# Patient Record
Sex: FEMALE | Race: BLACK OR AFRICAN AMERICAN | NOT HISPANIC OR LATINO | Employment: OTHER | URBAN - METROPOLITAN AREA
[De-identification: names, ages, dates, MRNs, and addresses within clinical notes are randomized per-mention and may not be internally consistent; named-entity substitution may affect disease eponyms.]

---

## 2020-02-12 ENCOUNTER — HOSPITAL ENCOUNTER (INPATIENT)
Facility: HOSPITAL | Age: 70
LOS: 12 days | Discharge: NURSING, REHAB OR LONG-TERM CARE | DRG: 616 | End: 2020-02-24
Attending: EMERGENCY MEDICINE | Admitting: HOSPITALIST
Payer: MEDICARE

## 2020-02-12 DIAGNOSIS — M25.572 CHRONIC PAIN OF LEFT ANKLE: ICD-10-CM

## 2020-02-12 DIAGNOSIS — E11.9 TYPE 2 DIABETES MELLITUS WITHOUT COMPLICATION, WITHOUT LONG-TERM CURRENT USE OF INSULIN: ICD-10-CM

## 2020-02-12 DIAGNOSIS — B99.9 INFECTION: ICD-10-CM

## 2020-02-12 DIAGNOSIS — I73.9 PAD (PERIPHERAL ARTERY DISEASE): ICD-10-CM

## 2020-02-12 DIAGNOSIS — L08.9 WOUND INFECTION: ICD-10-CM

## 2020-02-12 DIAGNOSIS — L97.509 ULCER OF FOOT, UNSPECIFIED LATERALITY, UNSPECIFIED ULCER STAGE: Primary | ICD-10-CM

## 2020-02-12 DIAGNOSIS — M79.89 LEFT ARM SWELLING: ICD-10-CM

## 2020-02-12 DIAGNOSIS — L03.90 CELLULITIS, UNSPECIFIED CELLULITIS SITE: ICD-10-CM

## 2020-02-12 DIAGNOSIS — M86.9 OSTEOMYELITIS OF LEFT FOOT, UNSPECIFIED TYPE: ICD-10-CM

## 2020-02-12 DIAGNOSIS — T14.8XXA WOUND INFECTION: ICD-10-CM

## 2020-02-12 DIAGNOSIS — M17.12 PRIMARY OSTEOARTHRITIS OF LEFT KNEE: ICD-10-CM

## 2020-02-12 DIAGNOSIS — M86.172 ACUTE OSTEOMYELITIS OF TOE OF LEFT FOOT: ICD-10-CM

## 2020-02-12 DIAGNOSIS — I10 ESSENTIAL HYPERTENSION: ICD-10-CM

## 2020-02-12 DIAGNOSIS — M86.072 ACUTE HEMATOGENOUS OSTEOMYELITIS OF LEFT FOOT: ICD-10-CM

## 2020-02-12 DIAGNOSIS — I80.8 SUPERFICIAL THROMBOPHLEBITIS OF LEFT UPPER EXTREMITY: ICD-10-CM

## 2020-02-12 DIAGNOSIS — L08.9 DIABETIC FOOT INFECTION: ICD-10-CM

## 2020-02-12 DIAGNOSIS — E11.628 DIABETIC FOOT INFECTION: ICD-10-CM

## 2020-02-12 DIAGNOSIS — G89.29 CHRONIC PAIN OF LEFT ANKLE: ICD-10-CM

## 2020-02-12 DIAGNOSIS — I73.9 PVD (PERIPHERAL VASCULAR DISEASE): ICD-10-CM

## 2020-02-12 LAB
ALBUMIN SERPL BCP-MCNC: 3.4 G/DL (ref 3.5–5.2)
ALP SERPL-CCNC: 92 U/L (ref 55–135)
ALT SERPL W/O P-5'-P-CCNC: 13 U/L (ref 10–44)
ANION GAP SERPL CALC-SCNC: 9 MMOL/L (ref 8–16)
AST SERPL-CCNC: 18 U/L (ref 10–40)
BACTERIA #/AREA URNS AUTO: ABNORMAL /HPF
BASOPHILS # BLD AUTO: 0.04 K/UL (ref 0–0.2)
BASOPHILS NFR BLD: 0.5 % (ref 0–1.9)
BILIRUB SERPL-MCNC: 0.2 MG/DL (ref 0.1–1)
BILIRUB UR QL STRIP: NEGATIVE
BUN SERPL-MCNC: 14 MG/DL (ref 8–23)
CALCIUM SERPL-MCNC: 10.1 MG/DL (ref 8.7–10.5)
CHLORIDE SERPL-SCNC: 101 MMOL/L (ref 95–110)
CLARITY UR REFRACT.AUTO: ABNORMAL
CO2 SERPL-SCNC: 28 MMOL/L (ref 23–29)
COLOR UR AUTO: YELLOW
CREAT SERPL-MCNC: 1 MG/DL (ref 0.5–1.4)
CRP SERPL-MCNC: 7.1 MG/L (ref 0–8.2)
DIFFERENTIAL METHOD: ABNORMAL
EOSINOPHIL # BLD AUTO: 0.3 K/UL (ref 0–0.5)
EOSINOPHIL NFR BLD: 3.6 % (ref 0–8)
ERYTHROCYTE [DISTWIDTH] IN BLOOD BY AUTOMATED COUNT: 13.9 % (ref 11.5–14.5)
ERYTHROCYTE [SEDIMENTATION RATE] IN BLOOD BY WESTERGREN METHOD: 88 MM/HR (ref 0–36)
EST. GFR  (AFRICAN AMERICAN): >60 ML/MIN/1.73 M^2
EST. GFR  (NON AFRICAN AMERICAN): 57.2 ML/MIN/1.73 M^2
ESTIMATED AVG GLUCOSE: 171 MG/DL (ref 68–131)
GLUCOSE SERPL-MCNC: 175 MG/DL (ref 70–110)
GLUCOSE UR QL STRIP: NEGATIVE
HBA1C MFR BLD HPLC: 7.6 % (ref 4–5.6)
HCT VFR BLD AUTO: 36.3 % (ref 37–48.5)
HGB BLD-MCNC: 11.3 G/DL (ref 12–16)
HGB UR QL STRIP: NEGATIVE
IMM GRANULOCYTES # BLD AUTO: 0.02 K/UL (ref 0–0.04)
IMM GRANULOCYTES NFR BLD AUTO: 0.3 % (ref 0–0.5)
KETONES UR QL STRIP: NEGATIVE
LACTATE SERPL-SCNC: 1.2 MMOL/L (ref 0.5–2.2)
LEUKOCYTE ESTERASE UR QL STRIP: ABNORMAL
LYMPHOCYTES # BLD AUTO: 3.2 K/UL (ref 1–4.8)
LYMPHOCYTES NFR BLD: 40.8 % (ref 18–48)
MAGNESIUM SERPL-MCNC: 1.9 MG/DL (ref 1.6–2.6)
MCH RBC QN AUTO: 29.7 PG (ref 27–31)
MCHC RBC AUTO-ENTMCNC: 31.1 G/DL (ref 32–36)
MCV RBC AUTO: 95 FL (ref 82–98)
MICROSCOPIC COMMENT: ABNORMAL
MONOCYTES # BLD AUTO: 0.6 K/UL (ref 0.3–1)
MONOCYTES NFR BLD: 7.4 % (ref 4–15)
NEUTROPHILS # BLD AUTO: 3.7 K/UL (ref 1.8–7.7)
NEUTROPHILS NFR BLD: 47.4 % (ref 38–73)
NITRITE UR QL STRIP: NEGATIVE
NRBC BLD-RTO: 0 /100 WBC
PH UR STRIP: 5 [PH] (ref 5–8)
PHOSPHATE SERPL-MCNC: 2.9 MG/DL (ref 2.7–4.5)
PLATELET # BLD AUTO: 228 K/UL (ref 150–350)
PMV BLD AUTO: 10.8 FL (ref 9.2–12.9)
POTASSIUM SERPL-SCNC: 4.1 MMOL/L (ref 3.5–5.1)
PROT SERPL-MCNC: 8 G/DL (ref 6–8.4)
PROT UR QL STRIP: NEGATIVE
RBC # BLD AUTO: 3.81 M/UL (ref 4–5.4)
RBC #/AREA URNS AUTO: 1 /HPF (ref 0–4)
SODIUM SERPL-SCNC: 138 MMOL/L (ref 136–145)
SP GR UR STRIP: 1.01 (ref 1–1.03)
SQUAMOUS #/AREA URNS AUTO: 34 /HPF
URN SPEC COLLECT METH UR: ABNORMAL
WBC # BLD AUTO: 7.72 K/UL (ref 3.9–12.7)
WBC #/AREA URNS AUTO: 8 /HPF (ref 0–5)

## 2020-02-12 PROCEDURE — 87040 BLOOD CULTURE FOR BACTERIA: CPT

## 2020-02-12 PROCEDURE — 12000002 HC ACUTE/MED SURGE SEMI-PRIVATE ROOM

## 2020-02-12 PROCEDURE — 63600175 PHARM REV CODE 636 W HCPCS: Performed by: STUDENT IN AN ORGANIZED HEALTH CARE EDUCATION/TRAINING PROGRAM

## 2020-02-12 PROCEDURE — 83605 ASSAY OF LACTIC ACID: CPT

## 2020-02-12 PROCEDURE — 85025 COMPLETE CBC W/AUTO DIFF WBC: CPT

## 2020-02-12 PROCEDURE — 83735 ASSAY OF MAGNESIUM: CPT

## 2020-02-12 PROCEDURE — 99284 EMERGENCY DEPT VISIT MOD MDM: CPT | Mod: ,,, | Performed by: EMERGENCY MEDICINE

## 2020-02-12 PROCEDURE — 84100 ASSAY OF PHOSPHORUS: CPT

## 2020-02-12 PROCEDURE — 80053 COMPREHEN METABOLIC PANEL: CPT

## 2020-02-12 PROCEDURE — 99223 1ST HOSP IP/OBS HIGH 75: CPT | Mod: ,,, | Performed by: HOSPITALIST

## 2020-02-12 PROCEDURE — 99223 PR INITIAL HOSPITAL CARE,LEVL III: ICD-10-PCS | Mod: ,,, | Performed by: HOSPITALIST

## 2020-02-12 PROCEDURE — 81001 URINALYSIS AUTO W/SCOPE: CPT

## 2020-02-12 PROCEDURE — 86140 C-REACTIVE PROTEIN: CPT

## 2020-02-12 PROCEDURE — 83036 HEMOGLOBIN GLYCOSYLATED A1C: CPT

## 2020-02-12 PROCEDURE — 96375 TX/PRO/DX INJ NEW DRUG ADDON: CPT

## 2020-02-12 PROCEDURE — 85652 RBC SED RATE AUTOMATED: CPT

## 2020-02-12 PROCEDURE — 99285 EMERGENCY DEPT VISIT HI MDM: CPT | Mod: 25

## 2020-02-12 PROCEDURE — 96374 THER/PROPH/DIAG INJ IV PUSH: CPT

## 2020-02-12 PROCEDURE — 99284 PR EMERGENCY DEPT VISIT,LEVEL IV: ICD-10-PCS | Mod: ,,, | Performed by: EMERGENCY MEDICINE

## 2020-02-12 RX ORDER — ACETAMINOPHEN 325 MG/1
650 TABLET ORAL EVERY 4 HOURS PRN
Status: DISCONTINUED | OUTPATIENT
Start: 2020-02-12 | End: 2020-02-24 | Stop reason: HOSPADM

## 2020-02-12 RX ORDER — LISINOPRIL 10 MG/1
10 TABLET ORAL DAILY
Status: DISCONTINUED | OUTPATIENT
Start: 2020-02-12 | End: 2020-02-19

## 2020-02-12 RX ORDER — IBUPROFEN 200 MG
16 TABLET ORAL
Status: DISCONTINUED | OUTPATIENT
Start: 2020-02-12 | End: 2020-02-13

## 2020-02-12 RX ORDER — GLUCAGON 1 MG
1 KIT INJECTION
Status: DISCONTINUED | OUTPATIENT
Start: 2020-02-12 | End: 2020-02-24 | Stop reason: HOSPADM

## 2020-02-12 RX ORDER — HYDROCODONE BITARTRATE AND ACETAMINOPHEN 10; 325 MG/1; MG/1
1 TABLET ORAL EVERY 4 HOURS PRN
Status: DISCONTINUED | OUTPATIENT
Start: 2020-02-12 | End: 2020-02-20

## 2020-02-12 RX ORDER — ONDANSETRON 2 MG/ML
8 INJECTION INTRAMUSCULAR; INTRAVENOUS EVERY 8 HOURS PRN
Status: DISCONTINUED | OUTPATIENT
Start: 2020-02-12 | End: 2020-02-24 | Stop reason: HOSPADM

## 2020-02-12 RX ORDER — TALC
6 POWDER (GRAM) TOPICAL NIGHTLY PRN
Status: DISCONTINUED | OUTPATIENT
Start: 2020-02-12 | End: 2020-02-24 | Stop reason: HOSPADM

## 2020-02-12 RX ORDER — ASPIRIN 325 MG
325 TABLET ORAL DAILY
Status: ON HOLD | COMMUNITY
End: 2020-02-21 | Stop reason: HOSPADM

## 2020-02-12 RX ORDER — IBUPROFEN 200 MG
24 TABLET ORAL
Status: DISCONTINUED | OUTPATIENT
Start: 2020-02-12 | End: 2020-02-13

## 2020-02-12 RX ORDER — SODIUM CHLORIDE 0.9 % (FLUSH) 0.9 %
5 SYRINGE (ML) INJECTION
Status: DISCONTINUED | OUTPATIENT
Start: 2020-02-12 | End: 2020-02-24 | Stop reason: HOSPADM

## 2020-02-12 RX ORDER — GLUCAGON 1 MG
1 KIT INJECTION
Status: DISCONTINUED | OUTPATIENT
Start: 2020-02-12 | End: 2020-02-13

## 2020-02-12 RX ORDER — AMOXICILLIN 250 MG
1 CAPSULE ORAL 2 TIMES DAILY PRN
Status: DISCONTINUED | OUTPATIENT
Start: 2020-02-12 | End: 2020-02-24 | Stop reason: HOSPADM

## 2020-02-12 RX ORDER — HYDROCODONE BITARTRATE AND ACETAMINOPHEN 5; 325 MG/1; MG/1
1 TABLET ORAL EVERY 4 HOURS PRN
Status: DISCONTINUED | OUTPATIENT
Start: 2020-02-12 | End: 2020-02-24 | Stop reason: HOSPADM

## 2020-02-12 RX ORDER — IBUPROFEN 200 MG
16 TABLET ORAL
Status: DISCONTINUED | OUTPATIENT
Start: 2020-02-12 | End: 2020-02-24 | Stop reason: HOSPADM

## 2020-02-12 RX ORDER — IBUPROFEN 200 MG
24 TABLET ORAL
Status: DISCONTINUED | OUTPATIENT
Start: 2020-02-12 | End: 2020-02-24 | Stop reason: HOSPADM

## 2020-02-12 RX ORDER — VANCOMYCIN 2 GRAM/500 ML IN 0.9 % SODIUM CHLORIDE INTRAVENOUS
2000
Status: COMPLETED | OUTPATIENT
Start: 2020-02-12 | End: 2020-02-12

## 2020-02-12 RX ADMIN — PIPERACILLIN AND TAZOBACTAM 4.5 G: 4; .5 INJECTION, POWDER, LYOPHILIZED, FOR SOLUTION INTRAVENOUS; PARENTERAL at 07:02

## 2020-02-12 RX ADMIN — VANCOMYCIN HYDROCHLORIDE 2000 MG: 100 INJECTION, POWDER, LYOPHILIZED, FOR SOLUTION INTRAVENOUS at 09:02

## 2020-02-13 ENCOUNTER — ANESTHESIA EVENT (OUTPATIENT)
Dept: SURGERY | Facility: HOSPITAL | Age: 70
DRG: 616 | End: 2020-02-13
Payer: MEDICARE

## 2020-02-13 ENCOUNTER — ANESTHESIA (OUTPATIENT)
Dept: SURGERY | Facility: HOSPITAL | Age: 70
DRG: 616 | End: 2020-02-13
Payer: MEDICARE

## 2020-02-13 PROBLEM — T14.8XXA WOUND INFECTION: Status: ACTIVE | Noted: 2020-02-13

## 2020-02-13 PROBLEM — M86.172 ACUTE OSTEOMYELITIS OF TOE OF LEFT FOOT: Status: ACTIVE | Noted: 2020-02-13

## 2020-02-13 PROBLEM — M25.572 CHRONIC PAIN OF LEFT ANKLE: Status: ACTIVE | Noted: 2020-02-13

## 2020-02-13 PROBLEM — G89.29 CHRONIC PAIN OF LEFT ANKLE: Status: ACTIVE | Noted: 2020-02-13

## 2020-02-13 PROBLEM — L08.9 WOUND INFECTION: Status: ACTIVE | Noted: 2020-02-13

## 2020-02-13 LAB
ALBUMIN SERPL BCP-MCNC: 3 G/DL (ref 3.5–5.2)
ALP SERPL-CCNC: 78 U/L (ref 55–135)
ALT SERPL W/O P-5'-P-CCNC: 12 U/L (ref 10–44)
ANION GAP SERPL CALC-SCNC: 7 MMOL/L (ref 8–16)
AST SERPL-CCNC: 16 U/L (ref 10–40)
BASOPHILS # BLD AUTO: 0.04 K/UL (ref 0–0.2)
BASOPHILS NFR BLD: 0.6 % (ref 0–1.9)
BILIRUB SERPL-MCNC: 0.3 MG/DL (ref 0.1–1)
BUN SERPL-MCNC: 10 MG/DL (ref 8–23)
CALCIUM SERPL-MCNC: 9.2 MG/DL (ref 8.7–10.5)
CHLORIDE SERPL-SCNC: 106 MMOL/L (ref 95–110)
CHOLEST SERPL-MCNC: 173 MG/DL (ref 120–199)
CHOLEST/HDLC SERPL: 4.2 {RATIO} (ref 2–5)
CO2 SERPL-SCNC: 26 MMOL/L (ref 23–29)
CREAT SERPL-MCNC: 0.8 MG/DL (ref 0.5–1.4)
DIFFERENTIAL METHOD: ABNORMAL
EOSINOPHIL # BLD AUTO: 0.3 K/UL (ref 0–0.5)
EOSINOPHIL NFR BLD: 4 % (ref 0–8)
ERYTHROCYTE [DISTWIDTH] IN BLOOD BY AUTOMATED COUNT: 14 % (ref 11.5–14.5)
EST. GFR  (AFRICAN AMERICAN): >60 ML/MIN/1.73 M^2
EST. GFR  (NON AFRICAN AMERICAN): >60 ML/MIN/1.73 M^2
GLUCOSE SERPL-MCNC: 163 MG/DL (ref 70–110)
GRAM STN SPEC: NORMAL
HCT VFR BLD AUTO: 31.5 % (ref 37–48.5)
HDLC SERPL-MCNC: 41 MG/DL (ref 40–75)
HDLC SERPL: 23.7 % (ref 20–50)
HGB BLD-MCNC: 9.9 G/DL (ref 12–16)
IMM GRANULOCYTES # BLD AUTO: 0.02 K/UL (ref 0–0.04)
IMM GRANULOCYTES NFR BLD AUTO: 0.3 % (ref 0–0.5)
LDLC SERPL CALC-MCNC: 117 MG/DL (ref 63–159)
LYMPHOCYTES # BLD AUTO: 2.6 K/UL (ref 1–4.8)
LYMPHOCYTES NFR BLD: 39.1 % (ref 18–48)
MAGNESIUM SERPL-MCNC: 1.9 MG/DL (ref 1.6–2.6)
MCH RBC QN AUTO: 29.9 PG (ref 27–31)
MCHC RBC AUTO-ENTMCNC: 31.4 G/DL (ref 32–36)
MCV RBC AUTO: 95 FL (ref 82–98)
MONOCYTES # BLD AUTO: 0.9 K/UL (ref 0.3–1)
MONOCYTES NFR BLD: 12.6 % (ref 4–15)
NEUTROPHILS # BLD AUTO: 2.9 K/UL (ref 1.8–7.7)
NEUTROPHILS NFR BLD: 43.4 % (ref 38–73)
NONHDLC SERPL-MCNC: 132 MG/DL
NRBC BLD-RTO: 0 /100 WBC
PHOSPHATE SERPL-MCNC: 3 MG/DL (ref 2.7–4.5)
PLATELET # BLD AUTO: 203 K/UL (ref 150–350)
PMV BLD AUTO: 10.7 FL (ref 9.2–12.9)
POCT GLUCOSE: 101 MG/DL (ref 70–110)
POCT GLUCOSE: 105 MG/DL (ref 70–110)
POCT GLUCOSE: 106 MG/DL (ref 70–110)
POCT GLUCOSE: 117 MG/DL (ref 70–110)
POTASSIUM SERPL-SCNC: 3.9 MMOL/L (ref 3.5–5.1)
PROT SERPL-MCNC: 6.9 G/DL (ref 6–8.4)
RBC # BLD AUTO: 3.31 M/UL (ref 4–5.4)
SODIUM SERPL-SCNC: 139 MMOL/L (ref 136–145)
TRIGL SERPL-MCNC: 75 MG/DL (ref 30–150)
WBC # BLD AUTO: 6.73 K/UL (ref 3.9–12.7)

## 2020-02-13 PROCEDURE — D9220A PRA ANESTHESIA: Mod: ANES,,, | Performed by: ANESTHESIOLOGY

## 2020-02-13 PROCEDURE — D9220A PRA ANESTHESIA: ICD-10-PCS | Mod: ANES,,, | Performed by: ANESTHESIOLOGY

## 2020-02-13 PROCEDURE — 71000015 HC POSTOP RECOV 1ST HR: Performed by: PODIATRIST

## 2020-02-13 PROCEDURE — 87186 SC STD MICRODIL/AGAR DIL: CPT

## 2020-02-13 PROCEDURE — S0020 INJECTION, BUPIVICAINE HYDRO: HCPCS | Performed by: PODIATRIST

## 2020-02-13 PROCEDURE — 88305 TISSUE EXAM BY PATHOLOGIST: CPT | Mod: 26,,, | Performed by: PATHOLOGY

## 2020-02-13 PROCEDURE — 87102 FUNGUS ISOLATION CULTURE: CPT | Mod: 59

## 2020-02-13 PROCEDURE — 36000705 HC OR TIME LEV I EA ADD 15 MIN: Performed by: PODIATRIST

## 2020-02-13 PROCEDURE — 14040 TIS TRNFR F/C/C/M/N/A/G/H/F: CPT | Mod: ,,, | Performed by: PODIATRIST

## 2020-02-13 PROCEDURE — 27201423 OPTIME MED/SURG SUP & DEVICES STERILE SUPPLY: Performed by: PODIATRIST

## 2020-02-13 PROCEDURE — 83735 ASSAY OF MAGNESIUM: CPT

## 2020-02-13 PROCEDURE — 99499 NO LOS: ICD-10-PCS | Mod: ,,, | Performed by: NURSE PRACTITIONER

## 2020-02-13 PROCEDURE — 63600175 PHARM REV CODE 636 W HCPCS: Performed by: INTERNAL MEDICINE

## 2020-02-13 PROCEDURE — 63600175 PHARM REV CODE 636 W HCPCS: Performed by: HOSPITALIST

## 2020-02-13 PROCEDURE — 99233 PR SUBSEQUENT HOSPITAL CARE,LEVL III: ICD-10-PCS | Mod: 57,,, | Performed by: PODIATRIST

## 2020-02-13 PROCEDURE — 87206 SMEAR FLUORESCENT/ACID STAI: CPT | Mod: 91

## 2020-02-13 PROCEDURE — 99499 UNLISTED E&M SERVICE: CPT | Mod: ,,, | Performed by: NURSE PRACTITIONER

## 2020-02-13 PROCEDURE — 37000009 HC ANESTHESIA EA ADD 15 MINS: Performed by: PODIATRIST

## 2020-02-13 PROCEDURE — 25000003 PHARM REV CODE 250: Performed by: NURSE ANESTHETIST, CERTIFIED REGISTERED

## 2020-02-13 PROCEDURE — 63600175 PHARM REV CODE 636 W HCPCS: Performed by: NURSE ANESTHETIST, CERTIFIED REGISTERED

## 2020-02-13 PROCEDURE — 80061 LIPID PANEL: CPT

## 2020-02-13 PROCEDURE — 87075 CULTR BACTERIA EXCEPT BLOOD: CPT

## 2020-02-13 PROCEDURE — 71000033 HC RECOVERY, INTIAL HOUR: Performed by: PODIATRIST

## 2020-02-13 PROCEDURE — 25500020 PHARM REV CODE 255: Performed by: HOSPITALIST

## 2020-02-13 PROCEDURE — 84100 ASSAY OF PHOSPHORUS: CPT

## 2020-02-13 PROCEDURE — 36000704 HC OR TIME LEV I 1ST 15 MIN: Performed by: PODIATRIST

## 2020-02-13 PROCEDURE — 80053 COMPREHEN METABOLIC PANEL: CPT

## 2020-02-13 PROCEDURE — 99233 PR SUBSEQUENT HOSPITAL CARE,LEVL III: ICD-10-PCS | Mod: ,,, | Performed by: INTERNAL MEDICINE

## 2020-02-13 PROCEDURE — 87205 SMEAR GRAM STAIN: CPT | Mod: 59

## 2020-02-13 PROCEDURE — 99232 PR SUBSEQUENT HOSPITAL CARE,LEVL II: ICD-10-PCS | Mod: ,,, | Performed by: HOSPITALIST

## 2020-02-13 PROCEDURE — 36415 COLL VENOUS BLD VENIPUNCTURE: CPT

## 2020-02-13 PROCEDURE — 87070 CULTURE OTHR SPECIMN AEROBIC: CPT | Mod: 59

## 2020-02-13 PROCEDURE — 87070 CULTURE OTHR SPECIMN AEROBIC: CPT

## 2020-02-13 PROCEDURE — 99233 SBSQ HOSP IP/OBS HIGH 50: CPT | Mod: 57,,, | Performed by: PODIATRIST

## 2020-02-13 PROCEDURE — 88305 TISSUE EXAM BY PATHOLOGIST: ICD-10-PCS | Mod: 26,,, | Performed by: PATHOLOGY

## 2020-02-13 PROCEDURE — 87116 MYCOBACTERIA CULTURE: CPT

## 2020-02-13 PROCEDURE — 25000003 PHARM REV CODE 250: Performed by: HOSPITALIST

## 2020-02-13 PROCEDURE — 37000008 HC ANESTHESIA 1ST 15 MINUTES: Performed by: PODIATRIST

## 2020-02-13 PROCEDURE — 82962 GLUCOSE BLOOD TEST: CPT | Performed by: PODIATRIST

## 2020-02-13 PROCEDURE — 85025 COMPLETE CBC W/AUTO DIFF WBC: CPT

## 2020-02-13 PROCEDURE — D9220A PRA ANESTHESIA: Mod: CRNA,,, | Performed by: NURSE ANESTHETIST, CERTIFIED REGISTERED

## 2020-02-13 PROCEDURE — 88305 TISSUE EXAM BY PATHOLOGIST: CPT | Performed by: PATHOLOGY

## 2020-02-13 PROCEDURE — 87077 CULTURE AEROBIC IDENTIFY: CPT

## 2020-02-13 PROCEDURE — 87015 SPECIMEN INFECT AGNT CONCNTJ: CPT

## 2020-02-13 PROCEDURE — 87075 CULTR BACTERIA EXCEPT BLOOD: CPT | Mod: 59

## 2020-02-13 PROCEDURE — 28810 PR AMPUTATION METATARSAL+TOE,SINGLE: ICD-10-PCS | Mod: 51,T4,, | Performed by: PODIATRIST

## 2020-02-13 PROCEDURE — 99232 SBSQ HOSP IP/OBS MODERATE 35: CPT | Mod: ,,, | Performed by: HOSPITALIST

## 2020-02-13 PROCEDURE — 11000001 HC ACUTE MED/SURG PRIVATE ROOM

## 2020-02-13 PROCEDURE — 25000003 PHARM REV CODE 250: Performed by: PODIATRIST

## 2020-02-13 PROCEDURE — 14040 PR ADJ TISS XFER HEAD,FAC,HAND <10 SQCM: ICD-10-PCS | Mod: ,,, | Performed by: PODIATRIST

## 2020-02-13 PROCEDURE — A9585 GADOBUTROL INJECTION: HCPCS | Performed by: HOSPITALIST

## 2020-02-13 PROCEDURE — D9220A PRA ANESTHESIA: ICD-10-PCS | Mod: CRNA,,, | Performed by: NURSE ANESTHETIST, CERTIFIED REGISTERED

## 2020-02-13 PROCEDURE — 28810 AMPUTATION TOE & METATARSAL: CPT | Mod: 51,T4,, | Performed by: PODIATRIST

## 2020-02-13 PROCEDURE — 99233 SBSQ HOSP IP/OBS HIGH 50: CPT | Mod: ,,, | Performed by: INTERNAL MEDICINE

## 2020-02-13 PROCEDURE — 25000003 PHARM REV CODE 250: Performed by: INTERNAL MEDICINE

## 2020-02-13 PROCEDURE — S0028 INJECTION, FAMOTIDINE, 20 MG: HCPCS | Performed by: NURSE ANESTHETIST, CERTIFIED REGISTERED

## 2020-02-13 PROCEDURE — 87076 CULTURE ANAEROBE IDENT EACH: CPT | Mod: 59

## 2020-02-13 RX ORDER — GLYCOPYRROLATE 0.2 MG/ML
INJECTION INTRAMUSCULAR; INTRAVENOUS
Status: DISCONTINUED | OUTPATIENT
Start: 2020-02-13 | End: 2020-02-13

## 2020-02-13 RX ORDER — FENTANYL CITRATE 50 UG/ML
INJECTION, SOLUTION INTRAMUSCULAR; INTRAVENOUS
Status: DISCONTINUED | OUTPATIENT
Start: 2020-02-13 | End: 2020-02-13

## 2020-02-13 RX ORDER — LIDOCAINE HCL/PF 100 MG/5ML
SYRINGE (ML) INTRAVENOUS
Status: DISCONTINUED | OUTPATIENT
Start: 2020-02-13 | End: 2020-02-13

## 2020-02-13 RX ORDER — DEXAMETHASONE SODIUM PHOSPHATE 4 MG/ML
INJECTION, SOLUTION INTRA-ARTICULAR; INTRALESIONAL; INTRAMUSCULAR; INTRAVENOUS; SOFT TISSUE
Status: DISCONTINUED | OUTPATIENT
Start: 2020-02-13 | End: 2020-02-13

## 2020-02-13 RX ORDER — ONDANSETRON 2 MG/ML
INJECTION INTRAMUSCULAR; INTRAVENOUS
Status: DISCONTINUED | OUTPATIENT
Start: 2020-02-13 | End: 2020-02-13

## 2020-02-13 RX ORDER — CEFEPIME HYDROCHLORIDE 2 G/1
2 INJECTION, POWDER, FOR SOLUTION INTRAVENOUS
Status: DISCONTINUED | OUTPATIENT
Start: 2020-02-13 | End: 2020-02-16

## 2020-02-13 RX ORDER — AMOXICILLIN 250 MG
1 CAPSULE ORAL DAILY
Status: DISCONTINUED | OUTPATIENT
Start: 2020-02-14 | End: 2020-02-24 | Stop reason: HOSPADM

## 2020-02-13 RX ORDER — HYDRALAZINE HYDROCHLORIDE 25 MG/1
25 TABLET, FILM COATED ORAL EVERY 8 HOURS PRN
Status: DISCONTINUED | OUTPATIENT
Start: 2020-02-13 | End: 2020-02-23

## 2020-02-13 RX ORDER — LIDOCAINE HYDROCHLORIDE 10 MG/ML
INJECTION, SOLUTION EPIDURAL; INFILTRATION; INTRACAUDAL; PERINEURAL
Status: DISCONTINUED | OUTPATIENT
Start: 2020-02-13 | End: 2020-02-13 | Stop reason: HOSPADM

## 2020-02-13 RX ORDER — PROPOFOL 10 MG/ML
VIAL (ML) INTRAVENOUS
Status: DISCONTINUED | OUTPATIENT
Start: 2020-02-13 | End: 2020-02-13

## 2020-02-13 RX ORDER — PROPOFOL 10 MG/ML
VIAL (ML) INTRAVENOUS CONTINUOUS PRN
Status: DISCONTINUED | OUTPATIENT
Start: 2020-02-13 | End: 2020-02-13

## 2020-02-13 RX ORDER — PHENYLEPHRINE HYDROCHLORIDE 10 MG/ML
INJECTION INTRAVENOUS
Status: DISCONTINUED | OUTPATIENT
Start: 2020-02-13 | End: 2020-02-13

## 2020-02-13 RX ORDER — METRONIDAZOLE 500 MG/1
500 TABLET ORAL 3 TIMES DAILY
Status: DISCONTINUED | OUTPATIENT
Start: 2020-02-13 | End: 2020-02-17

## 2020-02-13 RX ORDER — GADOBUTROL 604.72 MG/ML
9 INJECTION INTRAVENOUS
Status: COMPLETED | OUTPATIENT
Start: 2020-02-13 | End: 2020-02-13

## 2020-02-13 RX ORDER — FAMOTIDINE 10 MG/ML
INJECTION INTRAVENOUS
Status: DISCONTINUED | OUTPATIENT
Start: 2020-02-13 | End: 2020-02-13

## 2020-02-13 RX ORDER — MIDAZOLAM HYDROCHLORIDE 1 MG/ML
INJECTION, SOLUTION INTRAMUSCULAR; INTRAVENOUS
Status: DISCONTINUED | OUTPATIENT
Start: 2020-02-13 | End: 2020-02-13

## 2020-02-13 RX ORDER — BUPIVACAINE HYDROCHLORIDE 5 MG/ML
INJECTION, SOLUTION EPIDURAL; INTRACAUDAL
Status: DISCONTINUED | OUTPATIENT
Start: 2020-02-13 | End: 2020-02-13 | Stop reason: HOSPADM

## 2020-02-13 RX ORDER — HYDROMORPHONE HYDROCHLORIDE 1 MG/ML
0.2 INJECTION, SOLUTION INTRAMUSCULAR; INTRAVENOUS; SUBCUTANEOUS EVERY 5 MIN PRN
Status: DISCONTINUED | OUTPATIENT
Start: 2020-02-13 | End: 2020-02-13

## 2020-02-13 RX ADMIN — GADOBUTROL 9 ML: 604.72 INJECTION INTRAVENOUS at 12:02

## 2020-02-13 RX ADMIN — ONDANSETRON 4 MG: 2 INJECTION INTRAMUSCULAR; INTRAVENOUS at 08:02

## 2020-02-13 RX ADMIN — DEXAMETHASONE SODIUM PHOSPHATE 4 MG: 4 INJECTION, SOLUTION INTRAMUSCULAR; INTRAVENOUS at 08:02

## 2020-02-13 RX ADMIN — METRONIDAZOLE 500 MG: 500 TABLET ORAL at 10:02

## 2020-02-13 RX ADMIN — VANCOMYCIN HYDROCHLORIDE 1250 MG: 1.25 INJECTION, POWDER, LYOPHILIZED, FOR SOLUTION INTRAVENOUS at 11:02

## 2020-02-13 RX ADMIN — SODIUM CHLORIDE, SODIUM GLUCONATE, SODIUM ACETATE, POTASSIUM CHLORIDE, MAGNESIUM CHLORIDE, SODIUM PHOSPHATE, DIBASIC, AND POTASSIUM PHOSPHATE: .53; .5; .37; .037; .03; .012; .00082 INJECTION, SOLUTION INTRAVENOUS at 07:02

## 2020-02-13 RX ADMIN — GLYCOPYRROLATE 0.2 MG: 0.2 INJECTION, SOLUTION INTRAMUSCULAR; INTRAVENOUS at 08:02

## 2020-02-13 RX ADMIN — FAMOTIDINE 20 MG: 10 INJECTION, SOLUTION INTRAVENOUS at 08:02

## 2020-02-13 RX ADMIN — PHENYLEPHRINE HYDROCHLORIDE 100 MCG: 10 INJECTION INTRAVENOUS at 08:02

## 2020-02-13 RX ADMIN — PIPERACILLIN AND TAZOBACTAM 4.5 G: 4; .5 INJECTION, POWDER, LYOPHILIZED, FOR SOLUTION INTRAVENOUS; PARENTERAL at 11:02

## 2020-02-13 RX ADMIN — METRONIDAZOLE 500 MG: 500 TABLET ORAL at 04:02

## 2020-02-13 RX ADMIN — PROPOFOL 20 MG: 10 INJECTION, EMULSION INTRAVENOUS at 08:02

## 2020-02-13 RX ADMIN — FENTANYL CITRATE 25 MCG: 50 INJECTION, SOLUTION INTRAMUSCULAR; INTRAVENOUS at 08:02

## 2020-02-13 RX ADMIN — PROPOFOL 100 MCG/KG/MIN: 10 INJECTION, EMULSION INTRAVENOUS at 08:02

## 2020-02-13 RX ADMIN — ACETAMINOPHEN 650 MG: 325 TABLET ORAL at 01:02

## 2020-02-13 RX ADMIN — MIDAZOLAM HYDROCHLORIDE 2 MG: 1 INJECTION, SOLUTION INTRAMUSCULAR; INTRAVENOUS at 07:02

## 2020-02-13 RX ADMIN — VANCOMYCIN HYDROCHLORIDE 1250 MG: 1.25 INJECTION, POWDER, LYOPHILIZED, FOR SOLUTION INTRAVENOUS at 12:02

## 2020-02-13 RX ADMIN — HYDRALAZINE HYDROCHLORIDE 25 MG: 25 TABLET, FILM COATED ORAL at 02:02

## 2020-02-13 RX ADMIN — LIDOCAINE HYDROCHLORIDE 100 MG: 20 INJECTION, SOLUTION INTRAVENOUS at 08:02

## 2020-02-13 NOTE — CONSULTS
[facility]  Podiatry  Consult Note    Patient Name: Ana Maria Souza  MRN: [id number]  Admission Date: 2/12/2020  Hospital Length of Stay: 1 days  Attending Physician: Bee Jasso MD  Primary Care Provider: Primary Doctor No     Inpatient consult to Podiatry  Consult performed by: Elliott Lassiter MD  Consult ordered by: Jodi Kaufman DO        Subjective:     History of Present Illness:  Ana Maria Souza is a 69 y.o. female who  has no past medical history on file.    Patient here for Left foot infection.  States that months ago she stepped on a roof tack.  States that she developed a wound on the top of her foot and pulled off some skin, and was told to come in because of infection.  Denies F/C/N/V.  Patient also complaining of chronic left ankle pain.    Patient alert and oriented, but otherwise not showing good judgement upon interview.  Patient wants to postpone surgical intervention.  Explained various time the severity of her foot infection and need for timely intervention, but doesn't reciprocate appropriate understanding of consequences this may have, is also consistently interrupting  asking about other issues she has and even social issues and contently having to be redirected.      Scheduled Meds:   lisinopril  10 mg Oral Daily    piperacillin-tazobactam (ZOSYN) IVPB  4.5 g Intravenous Q8H    vancomycin (VANCOCIN) IVPB  15 mg/kg Intravenous Q12H     Continuous Infusions:  PRN Meds:acetaminophen, Dextrose 10% Bolus, Dextrose 10% Bolus, Dextrose 10% Bolus, Dextrose 10% Bolus, glucagon (human recombinant), glucose, glucose, HYDROcodone-acetaminophen, HYDROcodone-acetaminophen, melatonin, ondansetron, promethazine (PHENERGAN) IVPB, senna-docusate 8.6-50 mg, sodium chloride 0.9%, DIPH,PERTUS (ADACEL),TETANUS PF VAC (ADULT), Pharmacy to dose Vancomycin consult **AND** vancomycin - pharmacy to dose    Review of patient's allergies indicates:  No Known Allergies     History reviewed.  No pertinent past medical history.  Past Surgical History:   Procedure Laterality Date    TYMPANOPLASTY         Family History     None        Tobacco Use    Smoking status: Never Smoker    Smokeless tobacco: Never Used   Substance and Sexual Activity    Alcohol use: Not on file     Comment: 3 glasses of wine/ week    Drug use: Never    Sexual activity: Not on file     Review of Systems   Constitutional: Negative for chills and fever.   HENT: Negative for facial swelling and hearing loss.    Respiratory: Negative for cough and shortness of breath.    Cardiovascular: Negative for leg swelling.   Gastrointestinal: Negative for nausea and vomiting.   Musculoskeletal: Negative for arthralgias and joint swelling.   Skin: Positive for wound. Negative for rash.   Psychiatric/Behavioral: Negative for agitation and confusion.     Objective:     Vital Signs (Most Recent):  Temp: 96.6 °F (35.9 °C) (02/13/20 0826)  Pulse: 79 (02/13/20 0826)  Resp: 18 (02/13/20 0826)  BP: (!) 150/71 (02/13/20 0826)  SpO2: 98 % (02/13/20 0826) Vital Signs (24h Range):  Temp:  [96.6 °F (35.9 °C)-98.9 °F (37.2 °C)] 96.6 °F (35.9 °C)  Pulse:  [69-98] 79  Resp:  [16-21] 18  SpO2:  [96 %-100 %] 98 %  BP: (141-161)/(65-83) 150/71     Weight: 79.4 kg (175 lb)  Body mass index is 28.25 kg/m².    Foot Exam    General  Orientation: alert and oriented to person, place, and time   Affect: appropriate       Right Foot/Ankle     Inspection and Palpation  Ecchymosis: none  Swelling: none     Neurovascular  Dorsalis pedis: 2+  Posterior tibial: 2+  Saphenous nerve sensation: diminished  Tibial nerve sensation: diminished  Superficial peroneal nerve sensation: diminished  Deep peroneal nerve sensation: diminished  Sural nerve sensation: diminished      Left Foot/Ankle      Inspection and Palpation  Ecchymosis: none    Neurovascular  Dorsalis pedis: 2+  Posterior tibial: 2+  Saphenous nerve sensation: diminished  Tibial nerve sensation:  diminished  Superficial peroneal nerve sensation: diminished  Deep peroneal nerve sensation: diminished  Sural nerve sensation: diminished            Laboratory:  A1C:   Recent Labs   Lab 02/12/20 1928   HGBA1C 7.6*     CBC:   Recent Labs   Lab 02/13/20 0340   WBC 6.73   RBC 3.31*   HGB 9.9*   HCT 31.5*      MCV 95   MCH 29.9   MCHC 31.4*     CMP:   Recent Labs   Lab 02/13/20 0340   *   CALCIUM 9.2   ALBUMIN 3.0*   PROT 6.9      K 3.9   CO2 26      BUN 10   CREATININE 0.8   ALKPHOS 78   ALT 12   AST 16   BILITOT 0.3     CRP:   Recent Labs   Lab 02/12/20 1928   CRP 7.1     ESR:   Recent Labs   Lab 02/12/20 1928   SEDRATE 88*     Wound Cultures: No results for input(s): LABAERO in the last 4320 hours.  Microbiology Results (last 7 days)     Procedure Component Value Units Date/Time    Culture, Anaerobe [[id number]]     Order Status:  No result Specimen:  Wound from Foot, Left     Aerobic culture [[id number]]     Order Status:  No result Specimen:  Wound from Foot, Left     Blood culture x two cultures. Draw prior to antibiotics [[id number]] Collected:  02/12/20 1918    Order Status:  Completed Specimen:  Blood from Peripheral, Hand, Right Updated:  02/13/20 0315     Blood Culture, Routine No Growth to date    Narrative:       Aerobic and anaerobic    Blood culture x two cultures. Draw prior to antibiotics [[id number]] Collected:  02/12/20 1743    Order Status:  Completed Specimen:  Blood from Peripheral, Antecubital, Left Updated:  02/13/20 0115     Blood Culture, Routine No Growth to date    Narrative:       Aerobic and anaerobic        Specimen (12h ago, onward)    None          Diagnostic Results:  Imaging Results           MRI Foot (Forefoot) Left W W/O Contrast (Final result)  Result time 02/13/20 01:26:44    Final result by Mirza Brown MD (02/13/20 01:26:44)                 Impression:      1.  Soft tissue irregularity along the dorsum/lateral aspect of the left forefoot with  associated heterogeneous region of central non-enhancement concerning for abscess or necrotic tissue with dimensions as above.  There are scattered foci of susceptibility artifact throughout this region concerning for soft tissue gas.    2.  Abnormal marrow edema and enhancement involving the 5th metatarsal and phalanges as well as the proximal and middle phalanges of the 4th toe concerning for associated osteomyelitis.    This report was flagged in Epic as abnormal.      Electronically signed by: Mirza Brown MD  Date:    02/13/2020  Time:    01:26             Narrative:    EXAMINATION:  MRI FOOT (FOREFOOT) LEFT W W/O CONTRAST    CLINICAL HISTORY:  Osteomyelitis suspected, foot swelling, diabetic;    TECHNIQUE:  Multiplanar, multisequence imaging of the left forefoot was performed before and after the administration of 9 cc gadolinium based IV contrast.    COMPARISON:  Left foot radiograph series 02/12/2020    FINDINGS:  There is significant soft tissue irregularity/ulceration along the dorsum/lateral aspect of the forefoot.  There is a large region of nonenhancing soft tissue along the lateral aspect of the forefoot measuring approximately 2.7 x 7.6 cm concerning for abscess or necrotic tissue (series 10, image 19).  This demonstrates heterogeneous T2 hyperintensity with scattered foci of susceptibility concerning for soft tissue gas.  There is abnormal marrow edema and enhancement involving the 5th metatarsal as well as the proximal, middle, and distal phalanges concerning for osteomyelitis.  Additional abnormal marrow edema and enhancement is present of the proximal and middle for phalanges of the 4th toe concerning for osteomyelitis.    There is diffuse edema of the plantar foot musculature.  No definite additional discrete collections identified.  There are mild degenerative changes of the midfoot.                                X-Ray Foot Complete Left (Final result)  Result time 02/12/20 20:17:22    Final  result by Sharad Baker MD (02/12/20 20:17:22)                 Impression:      Osseous erosion involving the distal 5th metatarsal, proximal and middle phalanx of the 5th digit and proximal phalanx of the 4th digit.  There is soft tissue thickening lateral to the 5th metatarsal with probable soft tissue air.  Findings suggest cellulitis with possible abscess and osteomyelitis.  Recommend orthopedic follow-up.    This report was flagged in Epic as abnormal.      Electronically signed by: Sharad Baker  Date:    02/12/2020  Time:    20:17             Narrative:    EXAMINATION:  XR FOOT COMPLETE 3 VIEW LEFT    CLINICAL HISTORY:  .  Unspecified infectious disease    TECHNIQUE:  AP, lateral and oblique views of the left foot were performed.    COMPARISON:  None    FINDINGS:  There is mild erosion noted to the distal 5th metatarsal head and shaft.  Minimal rotation of the adjacent proximal portion of the proximal phalanx of the 5th digit also.  There is soft tissue edema and thickening laterally.  Probable air in the soft tissues laterally also.  Findings suggest cellulitis with possible abscess and osteomyelitis.  Recommend follow-up.    There is erosion of the midportion of the proximal phalanx of the 4th digit also.  Limited visualization.    Mild erosion of the medial margin of the middle phalanx of the 5th digit also.                               X-Ray Chest 1 View (Final result)  Result time 02/12/20 18:37:00    Final result by Ramos Rushing MD (02/12/20 18:37:00)                 Impression:      No radiographic acute intrathoracic process seen.  Specifically, no focal consolidation.      Electronically signed by: Ramos Rushing MD  Date:    02/12/2020  Time:    18:37             Narrative:    EXAMINATION:  XR CHEST 1 VIEW    CLINICAL HISTORY:  Sepsis;    TECHNIQUE:  Single frontal view of the chest was performed.    COMPARISON:  None    FINDINGS:  Mild nonspecific elevation of the left hemidiaphragm.The lungs  are clear, with normal appearance of pulmonary vasculature and no pleural effusion or pneumothorax.    The cardiac silhouette is normal in size. Suspected mild tortuosity of the thoracic aorta.  Hilar contours are within normal limits.    Bones are intact.  PA and lateral views can be obtained.                                  Clinical Findings:  Ulcer Location: Left lateral dorsal foot  Measurements:  Periwound: atrophic  Drainage: sero-purulent, slough..  Base:  fibrogranular with liquefactive necrotic tissue  Signs of infection: purulence, malodor, edema, erythema, ischemia noted to 5th toe.        Assessment/Plan:     Chronic pain of left ankle  Patient with left ankle pain states she broke her ankle in the past  Will order ankle x-ray.  May require ankle brace when her foot wound heals.    Acute osteomyelitis of toe of left foot   Ana Maria Souza is a 69 y.o. female with Gas gangrene with osteomyelitis left foot   -ESR: 88, CRP: 7.1, WBC: 6.7  -Imaging: Xray and MRI showing soft tissue gas and osteomyelitis of 5th met head  -Discussed treatment options with patient.  Plan on I&D with possible 5th ray amputation.  Keep patient NPO  -washed out at bedside, -applied betadine wet to dry gauze dressigns  -ID consulted appreciate recs  -continue IV antibiotics  -obtained wound cultures.      Type 2 diabetes mellitus, without long-term current use of insulin  A1c 7.6  Per primary          Thank you for your consult. I will follow-up with patient. Please contact us if you have any additional questions.    Elliott Mcclain MD  Podiatry  [facility]

## 2020-02-13 NOTE — ASSESSMENT & PLAN NOTE
Patient with left ankle pain states she broke her ankle in the past  Will order ankle x-ray.  May require ankle brace when her foot wound heals.

## 2020-02-13 NOTE — ED NOTES
Pt awake and alert; resting quietly on stretcher.  Pt remains on continuous cardiac and pulse ox monitoring with non-invasive blood pressure to cycle every 30 minutes.  VS stable; NSR noted. Pt denies pain at this time; no acute distress or discomfort reported or observed.  Pt denies restroom needs at this time; is able to reposition self on stretcher. Bed locked in lowest position; side rails up and locked x 2; call light, bedside table, and personal belongings within reach. Room assessed for safety measures and cleanliness; no action needed at this time. Plan of care discussed.  Pt instructed to alert nurse for assistance and before attempting to get out of bed; verbalizes understanding. Pt denies needs or complaints at this time; will continue to monitor.

## 2020-02-13 NOTE — ASSESSMENT & PLAN NOTE
Ana Maria Souza is a 69 y.o. female with Gas gangrene with osteomyelitis left foot   -ESR: 88, CRP: 7.1, WBC: 6.7  -Imaging: Xray and MRI showing soft tissue gas and osteomyelitis of 5th met head  -Discussed treatment options with patient.  Plan on I&D with possible 5th ray amputation.  Keep patient NPO  -washed out at bedside, -applied betadine wet to dry gauze dressigns  -ID consulted appreciate recs  -continue IV antibiotics  -obtained wound cultures.

## 2020-02-13 NOTE — ED NOTES
LOC: The patient is awake, alert, and oriented to place, time, situation. Affect is appropriate.  Speech is appropriate and clear.     APPEARANCE: Patient resting comfortably in no acute distress.  Patient is clean and well groomed.    SKIN: The skin is warm and dry; color consistent with ethnicity.  Patient has normal skin turgor and moist mucus membranes.  Patient presents with an odorous purulent foot wound extending to the 4th and 5th digit.     MUSCULOSKELETAL: Patient moving upper and lower extremities without difficulty. Swelling to the left ankle and foot noted.  Denies weakness. Pt walks with a cane.      RESPIRATORY: Airway is open and patent. Respirations spontaneous, even, easy, and non-labored.  Patient has a normal effort and rate.  No accessory muscle use noted. Denies cough.     CARDIAC:  Normal rhythm and rate noted. No complaints of chest pain.      ABDOMEN: Soft and non tender to palpation.  No distention noted.     NEUROLOGIC: Eyes open spontaneously.  Behavior appropriate to situation.  Follows commands; facial expression symmetrical.  Purposeful motor response noted; normal sensation in all extremities.

## 2020-02-13 NOTE — ED PROVIDER NOTES
"Encounter Date: 2/12/2020       History     Chief Complaint   Patient presents with    Wound Infection     states stepped on nail 3 weeks ago/ infected     Ms. Souza is a 68 y/o female with past medical history of diabetes (not on medication) recently moving from [place] presents to the ED after stepping on a roofers nail in her back yard 3 months ago. Patient reports that she did not get her foot treated at that time. She developed a small wound on the dorsum on her left foot along the 4th and 5th metatarsals for which she did not begin treating till about 2 weeks ago when she noticed the wound was opening more and worsening. The patient reports filling the wound with silver sulfadiazine cream and the wound hardening with a thick eschar over the course of 4 days. After the eschar fully hardened the patient proceeded to pull the eschar off of her foot and reports that a "large chunk" of her skin came off with it with white purulent material and odor. The patient waited to go to the ED until her medicaid was completed. She reports not being on any home medications and was told over two years ago after going to an urgent care clinic that she would need to be seen by a physician for management of her diabetes. The patient reports having additional symptoms of vision changes where she "feels she needs new glasses" and polyuria for over a year. She denies fever, chills, CP, SOB, abdominal pain, changes in the sensation of her legs and feet and motor changes. On examination the patient has an approximately 2.5 X 3" opened purulent odor necrotic wound on the dorsum of her foot around extending into the 4th and 5th digit. She has additional ulcer on the the 1st digit of that foot. Pulses were able to be obtained by doppler on the left foot which is weaker than the right. Left leg is edematous, warm and erythematous compared to the right. The patient is currently afebrile and hemodynamically stable. Podiatry was " consulted for assessment and debridement        Review of patient's allergies indicates:  No Known Allergies  History reviewed. No pertinent past medical history.  Past Surgical History:   Procedure Laterality Date    TYMPANOPLASTY       History reviewed. No pertinent family history.  Social History     Tobacco Use    Smoking status: Never Smoker    Smokeless tobacco: Never Used   Substance Use Topics    Alcohol use: Not on file     Comment: 3 glasses of wine/ week    Drug use: Never     Review of Systems   Constitutional: Negative for activity change, chills and fever.   HENT: Negative for trouble swallowing and voice change.    Eyes: Negative for photophobia and visual disturbance.   Respiratory: Negative for chest tightness and shortness of breath.    Cardiovascular: Positive for leg swelling (Left more than right). Negative for chest pain and palpitations.   Gastrointestinal: Negative for abdominal distention and abdominal pain.   Endocrine: Positive for polyuria.   Genitourinary: Negative for difficulty urinating and dysuria.   Musculoskeletal: Negative for arthralgias.   Skin: Positive for color change and wound (Left foot).   Neurological: Negative for dizziness, weakness and light-headedness.   Psychiatric/Behavioral: Negative for confusion. The patient is not nervous/anxious.        Physical Exam     Initial Vitals [02/12/20 1542]   BP Pulse Resp Temp SpO2   (!) 161/75 98 16 98.9 °F (37.2 °C) 98 %      MAP       --         Physical Exam    Constitutional: She appears well-developed and well-nourished. She is not diaphoretic. No distress.   HENT:   Head: Normocephalic and atraumatic.   Eyes: Conjunctivae and EOM are normal.   Neck: Normal range of motion. Neck supple.   Cardiovascular: Normal rate and regular rhythm.   Pulses to doppler on left lower extremity in the pedal pulse and posterior tibial pulse - weak compared to the right (1/4)     Pulmonary/Chest: Breath sounds normal. No respiratory  distress.   Abdominal: Soft. Bowel sounds are normal. She exhibits no distension.   Musculoskeletal: She exhibits edema (1+ edema in the LLE).        Left foot: Left great toe: Exhibits deformity, swelling and tenderness. Left 4th toe: Exhibits decreased ROM, deformity, tenderness and swelling. Injuries: puncture wound. Left little toe: Exhibits tenderness, swelling, deformity and decreased ROM. Injuries: puncture wound.   Neurological: She is alert and oriented to person, place, and time.   Skin: Skin is warm.   Left lower extremity is warm and edematous Compared to the right. The patient has sensation in tact to both         ED Course   Procedures  Labs Reviewed   CBC W/ AUTO DIFFERENTIAL - Abnormal; Notable for the following components:       Result Value    RBC 3.81 (*)     Hemoglobin 11.3 (*)     Hematocrit 36.3 (*)     Mean Corpuscular Hemoglobin Conc 31.1 (*)     All other components within normal limits   COMPREHENSIVE METABOLIC PANEL - Abnormal; Notable for the following components:    Glucose 175 (*)     Albumin 3.4 (*)     eGFR if non  57.2 (*)     All other components within normal limits   URINALYSIS, REFLEX TO URINE CULTURE - Abnormal; Notable for the following components:    Appearance, UA Cloudy (*)     Leukocytes, UA 2+ (*)     All other components within normal limits    Narrative:     Preferred Collection Type->Urine, Clean Catch   SEDIMENTATION RATE - Abnormal; Notable for the following components:    Sed Rate 88 (*)     All other components within normal limits   URINALYSIS MICROSCOPIC - Abnormal; Notable for the following components:    WBC, UA 8 (*)     Bacteria Few (*)     All other components within normal limits    Narrative:     Preferred Collection Type->Urine, Clean Catch   CULTURE, BLOOD   CULTURE, BLOOD   LACTIC ACID, PLASMA   MAGNESIUM   PHOSPHORUS   C-REACTIVE PROTEIN   HEMOGLOBIN A1C          Imaging Results           X-Ray Foot Complete Left (Final result)  Result  time 02/12/20 20:17:22    Final result by Sharad Baker MD (02/12/20 20:17:22)                 Impression:      Osseous erosion involving the distal 5th metatarsal, proximal and middle phalanx of the 5th digit and proximal phalanx of the 4th digit.  There is soft tissue thickening lateral to the 5th metatarsal with probable soft tissue air.  Findings suggest cellulitis with possible abscess and osteomyelitis.  Recommend orthopedic follow-up.    This report was flagged in Epic as abnormal.      Electronically signed by: Sharad Baker  Date:    02/12/2020  Time:    20:17             Narrative:    EXAMINATION:  XR FOOT COMPLETE 3 VIEW LEFT    CLINICAL HISTORY:  .  Unspecified infectious disease    TECHNIQUE:  AP, lateral and oblique views of the left foot were performed.    COMPARISON:  None    FINDINGS:  There is mild erosion noted to the distal 5th metatarsal head and shaft.  Minimal rotation of the adjacent proximal portion of the proximal phalanx of the 5th digit also.  There is soft tissue edema and thickening laterally.  Probable air in the soft tissues laterally also.  Findings suggest cellulitis with possible abscess and osteomyelitis.  Recommend follow-up.    There is erosion of the midportion of the proximal phalanx of the 4th digit also.  Limited visualization.    Mild erosion of the medial margin of the middle phalanx of the 5th digit also.                               X-Ray Chest 1 View (Final result)  Result time 02/12/20 18:37:00    Final result by Ramos Rushing MD (02/12/20 18:37:00)                 Impression:      No radiographic acute intrathoracic process seen.  Specifically, no focal consolidation.      Electronically signed by: Ramos Rushing MD  Date:    02/12/2020  Time:    18:37             Narrative:    EXAMINATION:  XR CHEST 1 VIEW    CLINICAL HISTORY:  Sepsis;    TECHNIQUE:  Single frontal view of the chest was performed.    COMPARISON:  None    FINDINGS:  Mild nonspecific elevation of  the left hemidiaphragm.The lungs are clear, with normal appearance of pulmonary vasculature and no pleural effusion or pneumothorax.    The cardiac silhouette is normal in size. Suspected mild tortuosity of the thoracic aorta.  Hilar contours are within normal limits.    Bones are intact.  PA and lateral views can be obtained.                                 Medical Decision Making:   History:   Old Medical Records: I decided to obtain old medical records.  Initial Assessment:   70 y/o with necrotic wound to the dorsum on her left foot after a nail puncture 3 weeks ago  Differential Diagnosis:   DDX: Necrosis, osteomyelitis, cellulitis, sepsis  Clinical Tests:   Lab Tests: Ordered and Reviewed  Radiological Study: Ordered and Reviewed  ED Management:  7:35 PM: X-ray ordered of left lower leg. The patient was started on empiric antibiotics (Vanc and Zosyn) and given a Tdap shot and podiatry was consulted for assessment and debridement    7:59 PM:  Spoke with podiatry about the patient. They asked for MRI and RAMON to be ordered in addition to the X-ray due to suspicion for PAD and Osteomyelitis.   CXR results showed - The lungs are clear, with normal appearance of pulmonary vasculature and no pleural effusion or pneumothorax. The patient does not appear septic at this time. She is afebrile without leukocytosis with a lactic acid of 1.2.    8:21 PM: X-ray of the foot shows possible possible cellulitis vs abscess vs. osteomyelitis    Other:   I have discussed this case with another health care provider.              Attending Attestation:   Physician Attestation Statement for Resident:  As the supervising MD   Physician Attestation Statement: I have personally seen and examined this patient.   I agree with the above history. -:   As the supervising MD I agree with the above PE.    As the supervising MD I agree with the above treatment, course, plan, and disposition.                                  Clinical Impression:        ICD-10-CM ICD-9-CM   1. Wound infection T14.8XXA 958.3    L08.9    2. Infection B99.9 136.9   3. Cellulitis, unspecified cellulitis site L03.90 682.9   4. Osteomyelitis of left foot, unspecified type M86.9 730.27         Disposition:   Disposition: Admitted  Condition: Stable                     Jodi Kaufman DO  Resident  02/12/20 2024       Meet Mckeon MD  02/12/20 2037

## 2020-02-13 NOTE — SUBJECTIVE & OBJECTIVE
Scheduled Meds:   lisinopril  10 mg Oral Daily    piperacillin-tazobactam (ZOSYN) IVPB  4.5 g Intravenous Q8H    vancomycin (VANCOCIN) IVPB  15 mg/kg Intravenous Q12H     Continuous Infusions:  PRN Meds:acetaminophen, Dextrose 10% Bolus, Dextrose 10% Bolus, Dextrose 10% Bolus, Dextrose 10% Bolus, glucagon (human recombinant), glucose, glucose, HYDROcodone-acetaminophen, HYDROcodone-acetaminophen, melatonin, ondansetron, promethazine (PHENERGAN) IVPB, senna-docusate 8.6-50 mg, sodium chloride 0.9%, DIPH,PERTUS (ADACEL),TETANUS PF VAC (ADULT), Pharmacy to dose Vancomycin consult **AND** vancomycin - pharmacy to dose    Review of patient's allergies indicates:  No Known Allergies     History reviewed. No pertinent past medical history.  Past Surgical History:   Procedure Laterality Date    TYMPANOPLASTY         Family History     None        Tobacco Use    Smoking status: Never Smoker    Smokeless tobacco: Never Used   Substance and Sexual Activity    Alcohol use: Not on file     Comment: 3 glasses of wine/ week    Drug use: Never    Sexual activity: Not on file     Review of Systems   Constitutional: Negative for chills and fever.   HENT: Negative for facial swelling and hearing loss.    Respiratory: Negative for cough and shortness of breath.    Cardiovascular: Negative for leg swelling.   Gastrointestinal: Negative for nausea and vomiting.   Musculoskeletal: Negative for arthralgias and joint swelling.   Skin: Positive for wound. Negative for rash.   Psychiatric/Behavioral: Negative for agitation and confusion.     Objective:     Vital Signs (Most Recent):  Temp: 96.6 °F (35.9 °C) (02/13/20 0826)  Pulse: 79 (02/13/20 0826)  Resp: 18 (02/13/20 0826)  BP: (!) 150/71 (02/13/20 0826)  SpO2: 98 % (02/13/20 0826) Vital Signs (24h Range):  Temp:  [96.6 °F (35.9 °C)-98.9 °F (37.2 °C)] 96.6 °F (35.9 °C)  Pulse:  [69-98] 79  Resp:  [16-21] 18  SpO2:  [96 %-100 %] 98 %  BP: (141-161)/(65-83) 150/71     Weight: 79.4 kg  (175 lb)  Body mass index is 28.25 kg/m².    Foot Exam    General  Orientation: alert and oriented to person, place, and time   Affect: appropriate       Right Foot/Ankle     Inspection and Palpation  Ecchymosis: none  Swelling: none     Neurovascular  Dorsalis pedis: 2+  Posterior tibial: 2+  Saphenous nerve sensation: diminished  Tibial nerve sensation: diminished  Superficial peroneal nerve sensation: diminished  Deep peroneal nerve sensation: diminished  Sural nerve sensation: diminished      Left Foot/Ankle      Inspection and Palpation  Ecchymosis: none    Neurovascular  Dorsalis pedis: 2+  Posterior tibial: 2+  Saphenous nerve sensation: diminished  Tibial nerve sensation: diminished  Superficial peroneal nerve sensation: diminished  Deep peroneal nerve sensation: diminished  Sural nerve sensation: diminished            Laboratory:  A1C:   Recent Labs   Lab 02/12/20 1928   HGBA1C 7.6*     CBC:   Recent Labs   Lab 02/13/20 0340   WBC 6.73   RBC 3.31*   HGB 9.9*   HCT 31.5*      MCV 95   MCH 29.9   MCHC 31.4*     CMP:   Recent Labs   Lab 02/13/20 0340   *   CALCIUM 9.2   ALBUMIN 3.0*   PROT 6.9      K 3.9   CO2 26      BUN 10   CREATININE 0.8   ALKPHOS 78   ALT 12   AST 16   BILITOT 0.3     CRP:   Recent Labs   Lab 02/12/20 1928   CRP 7.1     ESR:   Recent Labs   Lab 02/12/20 1928   SEDRATE 88*     Wound Cultures: No results for input(s): LABAERO in the last 4320 hours.  Microbiology Results (last 7 days)     Procedure Component Value Units Date/Time    Culture, Anaerobe [[id number]]     Order Status:  No result Specimen:  Wound from Foot, Left     Aerobic culture [[id number]]     Order Status:  No result Specimen:  Wound from Foot, Left     Blood culture x two cultures. Draw prior to antibiotics [[id number]] Collected:  02/12/20 1918    Order Status:  Completed Specimen:  Blood from Peripheral, Hand, Right Updated:  02/13/20 0315     Blood Culture, Routine No Growth to date     Narrative:       Aerobic and anaerobic    Blood culture x two cultures. Draw prior to antibiotics [[id number]] Collected:  02/12/20 4308    Order Status:  Completed Specimen:  Blood from Peripheral, Antecubital, Left Updated:  02/13/20 0115     Blood Culture, Routine No Growth to date    Narrative:       Aerobic and anaerobic        Specimen (12h ago, onward)    None          Diagnostic Results:  Imaging Results           MRI Foot (Forefoot) Left W W/O Contrast (Final result)  Result time 02/13/20 01:26:44    Final result by Mirza Brown MD (02/13/20 01:26:44)                 Impression:      1.  Soft tissue irregularity along the dorsum/lateral aspect of the left forefoot with associated heterogeneous region of central non-enhancement concerning for abscess or necrotic tissue with dimensions as above.  There are scattered foci of susceptibility artifact throughout this region concerning for soft tissue gas.    2.  Abnormal marrow edema and enhancement involving the 5th metatarsal and phalanges as well as the proximal and middle phalanges of the 4th toe concerning for associated osteomyelitis.    This report was flagged in Epic as abnormal.      Electronically signed by: Mirza Brown MD  Date:    02/13/2020  Time:    01:26             Narrative:    EXAMINATION:  MRI FOOT (FOREFOOT) LEFT W W/O CONTRAST    CLINICAL HISTORY:  Osteomyelitis suspected, foot swelling, diabetic;    TECHNIQUE:  Multiplanar, multisequence imaging of the left forefoot was performed before and after the administration of 9 cc gadolinium based IV contrast.    COMPARISON:  Left foot radiograph series 02/12/2020    FINDINGS:  There is significant soft tissue irregularity/ulceration along the dorsum/lateral aspect of the forefoot.  There is a large region of nonenhancing soft tissue along the lateral aspect of the forefoot measuring approximately 2.7 x 7.6 cm concerning for abscess or necrotic tissue (series 10, image 19).  This  demonstrates heterogeneous T2 hyperintensity with scattered foci of susceptibility concerning for soft tissue gas.  There is abnormal marrow edema and enhancement involving the 5th metatarsal as well as the proximal, middle, and distal phalanges concerning for osteomyelitis.  Additional abnormal marrow edema and enhancement is present of the proximal and middle for phalanges of the 4th toe concerning for osteomyelitis.    There is diffuse edema of the plantar foot musculature.  No definite additional discrete collections identified.  There are mild degenerative changes of the midfoot.                                X-Ray Foot Complete Left (Final result)  Result time 02/12/20 20:17:22    Final result by Sharad Baker MD (02/12/20 20:17:22)                 Impression:      Osseous erosion involving the distal 5th metatarsal, proximal and middle phalanx of the 5th digit and proximal phalanx of the 4th digit.  There is soft tissue thickening lateral to the 5th metatarsal with probable soft tissue air.  Findings suggest cellulitis with possible abscess and osteomyelitis.  Recommend orthopedic follow-up.    This report was flagged in Epic as abnormal.      Electronically signed by: Sharad Baker  Date:    02/12/2020  Time:    20:17             Narrative:    EXAMINATION:  XR FOOT COMPLETE 3 VIEW LEFT    CLINICAL HISTORY:  .  Unspecified infectious disease    TECHNIQUE:  AP, lateral and oblique views of the left foot were performed.    COMPARISON:  None    FINDINGS:  There is mild erosion noted to the distal 5th metatarsal head and shaft.  Minimal rotation of the adjacent proximal portion of the proximal phalanx of the 5th digit also.  There is soft tissue edema and thickening laterally.  Probable air in the soft tissues laterally also.  Findings suggest cellulitis with possible abscess and osteomyelitis.  Recommend follow-up.    There is erosion of the midportion of the proximal phalanx of the 4th digit also.  Limited  visualization.    Mild erosion of the medial margin of the middle phalanx of the 5th digit also.                               X-Ray Chest 1 View (Final result)  Result time 02/12/20 18:37:00    Final result by Ramos Rushing MD (02/12/20 18:37:00)                 Impression:      No radiographic acute intrathoracic process seen.  Specifically, no focal consolidation.      Electronically signed by: Ramos Rushing MD  Date:    02/12/2020  Time:    18:37             Narrative:    EXAMINATION:  XR CHEST 1 VIEW    CLINICAL HISTORY:  Sepsis;    TECHNIQUE:  Single frontal view of the chest was performed.    COMPARISON:  None    FINDINGS:  Mild nonspecific elevation of the left hemidiaphragm.The lungs are clear, with normal appearance of pulmonary vasculature and no pleural effusion or pneumothorax.    The cardiac silhouette is normal in size. Suspected mild tortuosity of the thoracic aorta.  Hilar contours are within normal limits.    Bones are intact.  PA and lateral views can be obtained.                                  Clinical Findings:  Ulcer Location: Left lateral dorsal foot  Measurements:  Periwound: atrophic  Drainage: sero-purulent, slough..  Base:  fibrogranular with liquefactive necrotic tissue  Signs of infection: purulence, malodor, edema, erythema, ischemia noted to 5th toe.

## 2020-02-13 NOTE — NURSING
Patient arrived to the 5th floor via stretcher @ 01:16 and placed into room 541 B. Orientated to room, white board updated, call bell within reach and instructed on how to use, bed lowered to lowest position, side rails up x 2, IS and hibiclens @ bedside, SCDs applied and turned on, VS taken and stable, L foot GEO when patient arrived - applied rolled gauze to protect skin.

## 2020-02-13 NOTE — HPI
Ms. Ana Maria Souza is a 69 year old woman with DM who presented to ED with a foul smelling left foot wound.  Per initial HPI, she stepped on a nail approximately 3 months prior to admission but did not seek medical care.  She was using OTC creams (silver sulfadiazine and ammonia acetate) but wound was not healing.  Developed an eschar which she pulled off and noticed a white purulent material with foul odor.  Subsequently developed erythema and swelling of left leg.  Did not go to the ED as she was waiting for Medicaid to be finalized.  Afebrile, no leukocytosis on arrival to ED.      Imaging:    X-ray left foot - showed osseous erosion of the distal 5th metatarsal head and proximal and middle phalanx of the 5th digit and proximal phalanx of the 4th digit.    MRI also concerning for osteomyelitis of left 4th and 5th toes.  Soft tissue gas noted.     Podiatry has evaluated and plan is for surgical I&D and possible 5th toe amputation today.

## 2020-02-13 NOTE — CONSULTS
[facility]  Infectious Disease  Consult Note    Patient Name: Ana Maria Souza  MRN: [id number]  Admission Date: 2/12/2020  Hospital Length of Stay: 1 days  Attending Physician: Bee Jasso MD  Primary Care Provider: Primary Doctor No     Isolation Status: No active isolations    Inpatient consult to Infectious Diseases  Consult performed by: PABLITO Chew, ANP  Consult ordered by: Jeanne Brandt MD  Reason for consult: diabetic foot infection      ID Consult acknowledged.   Full consult and recommendations to follow today  In the interim, please call for any immediate concerns.     Thank you.   PABLITO Kauffman, ANP-C  [phone] pager,  [phone]

## 2020-02-13 NOTE — SUBJECTIVE & OBJECTIVE
History reviewed. No pertinent past medical history.    Past Surgical History:   Procedure Laterality Date    TYMPANOPLASTY         Review of patient's allergies indicates:  No Known Allergies    Medications:  Medications Prior to Admission   Medication Sig    aspirin 325 MG tablet Take 325 mg by mouth once daily.     Antibiotics (From admission, onward)    Start     Stop Route Frequency Ordered    02/13/20 1530  ceFEPIme injection 2 g      -- IV Every 8 hours (non-standard times) 02/13/20 1424    02/13/20 1530  metroNIDAZOLE tablet 500 mg      -- Oral 3 times daily 02/13/20 1424    02/13/20 1030  vancomycin 1.25 g in dextrose 5% 250 mL IVPB (ready to mix)      -- IV Every 12 hours (non-standard times) 02/13/20 0926    02/13/20 1011  vancomycin - pharmacy to dose  (vancomycin IVPB)      -- IV pharmacy to manage frequency 02/13/20 0912        Antifungals (From admission, onward)    None        Antivirals (From admission, onward)    None           There is no immunization history for the selected administration types on file for this patient.    Family History     None        Social History     Socioeconomic History    Marital status: Single     Spouse name: Not on file    Number of children: Not on file    Years of education: Not on file    Highest education level: Not on file   Occupational History    Not on file   Social Needs    Financial resource strain: Not on file    Food insecurity:     Worry: Not on file     Inability: Not on file    Transportation needs:     Medical: Not on file     Non-medical: Not on file   Tobacco Use    Smoking status: Never Smoker    Smokeless tobacco: Never Used   Substance and Sexual Activity    Alcohol use: Not on file     Comment: 3 glasses of wine/ week    Drug use: Never    Sexual activity: Not on file   Lifestyle    Physical activity:     Days per week: Not on file     Minutes per session: Not on file    Stress: Not on file   Relationships    Social connections:      Talks on phone: Not on file     Gets together: Not on file     Attends [employer]od[employer] service: Not on file     Active member of club or organization: Not on file     Attends meetings of clubs or organizations: Not on file     Relationship status: Not on file   Other Topics Concern    Not on file   Social History Narrative    Not on file     Review of Systems   Constitutional: Negative for activity change, appetite change, chills, diaphoresis, fatigue and fever.   HENT: Negative.    Eyes: Negative.    Respiratory: Negative for cough, shortness of breath and wheezing.    Cardiovascular: Positive for leg swelling. Negative for chest pain and palpitations.   Gastrointestinal: Negative for abdominal pain, constipation, diarrhea, nausea and vomiting.   Genitourinary: Negative for difficulty urinating, dysuria and flank pain.   Musculoskeletal: Positive for joint swelling. Negative for arthralgias, back pain and neck pain.   Skin: Positive for color change and wound. Negative for rash.   Neurological: Negative for dizziness, weakness, light-headedness and headaches.   Hematological: Negative for adenopathy.   Psychiatric/Behavioral: Negative for agitation, behavioral problems and confusion.     Objective:     Vital Signs (Most Recent):  Temp: 98 °F (36.7 °C) (02/13/20 1610)  Pulse: 77 (02/13/20 1610)  Resp: 18 (02/13/20 1610)  BP: (!) 168/74 (02/13/20 1610)  SpO2: 95 % (02/13/20 1610) Vital Signs (24h Range):  Temp:  [96.4 °F (35.8 °C)-98.1 °F (36.7 °C)] 98 °F (36.7 °C)  Pulse:  [66-85] 77  Resp:  [16-21] 18  SpO2:  [95 %-100 %] 95 %  BP: (141-183)/(65-83) 168/74     Weight: 79.4 kg (175 lb)  Body mass index is 28.25 kg/m².    Estimated Creatinine Clearance: 69.5 mL/min (based on SCr of 0.8 mg/dL).    Physical Exam   Constitutional: She is oriented to person, place, and time. She appears well-developed and well-nourished. No distress.   HENT:   Head: Normocephalic and atraumatic.   Eyes: Conjunctivae are normal. No  scleral icterus.   Neck: Normal range of motion.   Cardiovascular: Normal rate and regular rhythm.   No murmur heard.  Pulmonary/Chest: Effort normal and breath sounds normal. No respiratory distress. She has no wheezes. She has no rales.   Abdominal: Soft. She exhibits no distension. There is no tenderness.   Musculoskeletal: She exhibits edema. She exhibits no tenderness.   Left foot wrapped and unable to examine.  Mild ankle swelling/warmth.    Neurological: She is alert and oriented to person, place, and time.   Skin: Skin is warm and dry. She is not diaphoretic.   Psychiatric: She has a normal mood and affect. Her behavior is normal.   Vitals reviewed.      Significant Labs:   Blood Culture:   Recent Labs   Lab 02/12/20 1743 02/12/20  1918   LABBLOO No Growth to date No Growth to date     CBC:   Recent Labs   Lab 02/12/20 1743 02/13/20  0340   WBC 7.72 6.73   HGB 11.3* 9.9*   HCT 36.3* 31.5*    203     CMP:   Recent Labs   Lab 02/12/20 1743 02/13/20  0340    139   K 4.1 3.9    106   CO2 28 26   * 163*   BUN 14 10   CREATININE 1.0 0.8   CALCIUM 10.1 9.2   PROT 8.0 6.9   ALBUMIN 3.4* 3.0*   BILITOT 0.2 0.3   ALKPHOS 92 78   AST 18 16   ALT 13 12   ANIONGAP 9 7*   EGFRNONAA 57.2* >60.0     Wound Culture: No results for input(s): LABAERO in the last 4320 hours.    Significant Imaging: I have reviewed all pertinent imaging results/findings within the past 24 hours.

## 2020-02-13 NOTE — ED TRIAGE NOTES
Pt. Presents with a left foot wound. States she stepped on a nail approximately 3 months ago. Pt. Says she put some cream and ripped off some of the skin approx 2 weeks.

## 2020-02-13 NOTE — ASSESSMENT & PLAN NOTE
69 year old woman with DM (HA1C 7.6) who presented to ED with a foul smelling left foot wound that has been present since she stepped on nail approx 3 months ago.  Did not seek medical care and has been treating herself with local OTC creams.  Developed an eschar which she pulled off and noticed a white purulent material with foul odor.  Subsequently developed erythema and swelling of left leg.  Afebrile, no leukocytosis on arrival to ED.  Imaging concerning for osteomyelitis of left 4th and 5th toes and soft tissue gas.  Podiatry has evaluated and plan is for surgical I&D and possible 5th toe amputation today.  Wound cultures taken and pending.  Bilateral LE arterial US pending.  Currently on IV vancomycin and zosyn.  Stable and non-septic appearing.    Plan/recommendations:  1.  Continue IV vancomycin empirically.  Pharmacy to dose.  Maintain trough 15-20  2.  Discontinued zosyn and started cefepime 2 grams IV q 8 hours and metronidazole 500 mg orally q 8 hours.   3.  Please send surgical bone cultures and clean margin (if amputation) - gram stain, aerobic, anaerobic, fungal and AFB  4.  Follow up arterial US  5.  Appears Tdap was ordered.  Please ensure administered.   6.  Will follow up tomorrow.     Patient seen by, and plan discussed with, ID staff  Discussed with Primary Team     

## 2020-02-13 NOTE — HPI
Ana Maria Souza is a 69 y.o. female who  has no past medical history on file.    Patient here for Left foot infection.  States that months ago she stepped on a roof tack.  States that she developed a wound on the top of her foot and pulled off some skin, and was told to come in because of infection.  Denies F/C/N/V.  Patient also complaining of chronic left ankle pain.    Patient alert and oriented, but otherwise not showing good judgement upon interview.  Patient wants to postpone surgical intervention.  Explained various time the severity of her foot infection and need for timely intervention, but doesn't reciprocate appropriate understanding of consequences this may have, is also consistently interrupting  asking about other issues she has and even social issues and contently having to be redirected.

## 2020-02-13 NOTE — PROGRESS NOTES
Pharmacokinetic Initial Assessment: IV Vancomycin    Assessment/Plan:    Initiate intravenous vancomycin with loading dose of 2000 mg once followed by a maintenance dose of vancomycin 1250mg IV every 12 hours  Desired empiric serum trough concentration is 15 to 20 mcg/mL  Draw vancomycin trough level 30 min prior to fourth dose on 2/14 at approximately 1000  Pharmacy will continue to follow and monitor vancomycin.      Please contact pharmacy at extension [phone] with any questions regarding this assessment.     Thank you for the consult,   Winter Figueroa       Patient brief summary:  Ana Maria Souza is a 69 y.o. female initiated on antimicrobial therapy with IV Vancomycin for treatment of suspected bone/joint infection    Drug Allergies:   Review of patient's allergies indicates:  No Known Allergies    Actual Body Weight:   79.4 kg    Renal Function:   Estimated Creatinine Clearance: 69.5 mL/min (based on SCr of 0.8 mg/dL).,     Dialysis Method (if applicable):  n/a    CBC (last 72 hours):  Recent Labs   Lab Result Units 02/12/20 1743 02/12/20 1928 02/13/20  0340   WBC K/uL 7.72  --  6.73   Hemoglobin g/dL 11.3*  --  9.9*   Hemoglobin A1C %  --  7.6*  --    Hematocrit % 36.3*  --  31.5*   Platelets K/uL 228  --  203   Gran% % 47.4  --  43.4   Lymph% % 40.8  --  39.1   Mono% % 7.4  --  12.6   Eosinophil% % 3.6  --  4.0   Basophil% % 0.5  --  0.6   Differential Method  Automated  --  Automated       Metabolic Panel (last 72 hours):  Recent Labs   Lab Result Units 02/12/20 1743 02/12/20 1919 02/13/20  0340   Sodium mmol/L 138  --  139   Potassium mmol/L 4.1  --  3.9   Chloride mmol/L 101  --  106   CO2 mmol/L 28  --  26   Glucose mg/dL 175*  --  163*   Glucose, UA   --  Negative  --    BUN, Bld mg/dL 14  --  10   Creatinine mg/dL 1.0  --  0.8   Albumin g/dL 3.4*  --  3.0*   Total Bilirubin mg/dL 0.2  --  0.3   Alkaline Phosphatase U/L 92  --  78   AST U/L 18  --  16   ALT U/L 13  --  12   Magnesium mg/dL 1.9   --  1.9   Phosphorus mg/dL 2.9  --  3.0       Drug levels (last 3 results):  No results for input(s): VANCOMYCINRA, VANCOMYCINPE, VANCOMYCINTR in the last 72 hours.    Microbiologic Results:  Microbiology Results (last 7 days)     Procedure Component Value Units Date/Time    Blood culture x two cultures. Draw prior to antibiotics [[id number]] Collected:  02/12/20 1918    Order Status:  Completed Specimen:  Blood from Peripheral, Hand, Right Updated:  02/13/20 0315     Blood Culture, Routine No Growth to date    Narrative:       Aerobic and anaerobic    Blood culture x two cultures. Draw prior to antibiotics [[id number]] Collected:  02/12/20 1743    Order Status:  Completed Specimen:  Blood from Peripheral, Antecubital, Left Updated:  02/13/20 0115     Blood Culture, Routine No Growth to date    Narrative:       Aerobic and anaerobic

## 2020-02-13 NOTE — ED NOTES
Telemetry Verification   Patient placed on Telemetry Box  Verified with War Room  Box # [id number]   Monitor Tech Brenda   Rate 81   Rhythm Normal sinus

## 2020-02-13 NOTE — CONSULTS
[facility]  Infectious Disease  Consult Note    Patient Name: Ana Maria Souza  MRN: [id number]  Admission Date: 2/12/2020  Hospital Length of Stay: 1 days  Attending Physician: Bee Jasso MD  Primary Care Provider: Primary Doctor No     Isolation Status: No active isolations    Patient information was obtained from patient and ER records.      Consults  Assessment/Plan:     Acute osteomyelitis of toe of left foot     69 year old woman with DM (HA1C 7.6) who presented to ED with a foul smelling left foot wound that has been present since she stepped on nail approx 3 months ago.  Did not seek medical care and has been treating herself with local OTC creams.  Developed an eschar which she pulled off and noticed a white purulent material with foul odor.  Subsequently developed erythema and swelling of left leg.  Afebrile, no leukocytosis on arrival to ED.  Imaging concerning for osteomyelitis of left 4th and 5th toes and soft tissue gas.  Podiatry has evaluated and plan is for surgical I&D and possible 5th toe amputation today.  Wound cultures taken and pending.  Bilateral LE arterial US pending.  Currently on IV vancomycin and zosyn.  Stable and non-septic appearing.    Plan/recommendations:  1.  Continue IV vancomycin empirically.  Pharmacy to dose.  Maintain trough 15-20  2.  Discontinued zosyn and started cefepime 2 grams IV q 8 hours and metronidazole 500 mg orally q 8 hours.   3.  Please send surgical bone cultures and clean margin (if amputation) - gram stain, aerobic, anaerobic, fungal and AFB  4.  Follow up arterial US  5.  Appears Tdap was ordered.  Please ensure administered.   6.  Will follow up tomorrow.     Patient seen by, and plan discussed with, ID staff  Discussed with Primary Team        Thank you.   Please call for any questions or concerns.  PABLITO Kauffman, ANP-C  [phone] pager, Ikwfxpf [phone]  Subjective:     Principal Problem: Diabetic foot infection    HPI: Ms.  Ana Maria Souza is a 69 year old woman with DM who presented to ED with a foul smelling left foot wound.  Per initial HPI, she stepped on a nail approximately 3 months prior to admission but did not seek medical care.  She was using OTC creams (silver sulfadiazine and ammonia acetate) but wound was not healing.  Developed an eschar which she pulled off and noticed a white purulent material with foul odor.  Subsequently developed erythema and swelling of left leg.  Did not go to the ED as she was waiting for Medicaid to be finalized.  Afebrile, no leukocytosis on arrival to ED.      Imaging:    X-ray left foot - showed osseous erosion of the distal 5th metatarsal head and proximal and middle phalanx of the 5th digit and proximal phalanx of the 4th digit.    MRI also concerning for osteomyelitis of left 4th and 5th toes.  Soft tissue gas noted.     Podiatry has evaluated and plan is for surgical I&D and possible 5th toe amputation today.     History reviewed. No pertinent past medical history.    Past Surgical History:   Procedure Laterality Date    TYMPANOPLASTY         Review of patient's allergies indicates:  No Known Allergies    Medications:  Medications Prior to Admission   Medication Sig    aspirin 325 MG tablet Take 325 mg by mouth once daily.     Antibiotics (From admission, onward)    Start     Stop Route Frequency Ordered    02/13/20 1530  ceFEPIme injection 2 g      -- IV Every 8 hours (non-standard times) 02/13/20 1424    02/13/20 1530  metroNIDAZOLE tablet 500 mg      -- Oral 3 times daily 02/13/20 1424    02/13/20 1030  vancomycin 1.25 g in dextrose 5% 250 mL IVPB (ready to mix)      -- IV Every 12 hours (non-standard times) 02/13/20 0926    02/13/20 1011  vancomycin - pharmacy to dose  (vancomycin IVPB)      -- IV pharmacy to manage frequency 02/13/20 0912        Antifungals (From admission, onward)    None        Antivirals (From admission, onward)    None           There is no immunization history  for the selected administration types on file for this patient.    Family History     None        Social History     Socioeconomic History    Marital status: Single     Spouse name: Not on file    Number of children: Not on file    Years of education: Not on file    Highest education level: Not on file   Occupational History    Not on file   Social Needs    Financial resource strain: Not on file    Food insecurity:     Worry: Not on file     Inability: Not on file    Transportation needs:     Medical: Not on file     Non-medical: Not on file   Tobacco Use    Smoking status: Never Smoker    Smokeless tobacco: Never Used   Substance and Sexual Activity    Alcohol use: Not on file     Comment: 3 glasses of wine/ week    Drug use: Never    Sexual activity: Not on file   Lifestyle    Physical activity:     Days per week: Not on file     Minutes per session: Not on file    Stress: Not on file   Relationships    Social connections:     Talks on phone: Not on file     Gets together: Not on file     Attends [employer] service: Not on file     Active member of club or organization: Not on file     Attends meetings of clubs or organizations: Not on file     Relationship status: Not on file   Other Topics Concern    Not on file   Social History Narrative    Not on file     Review of Systems   Constitutional: Negative for activity change, appetite change, chills, diaphoresis, fatigue and fever.   HENT: Negative.    Eyes: Negative.    Respiratory: Negative for cough, shortness of breath and wheezing.    Cardiovascular: Positive for leg swelling. Negative for chest pain and palpitations.   Gastrointestinal: Negative for abdominal pain, constipation, diarrhea, nausea and vomiting.   Genitourinary: Negative for difficulty urinating, dysuria and flank pain.   Musculoskeletal: Positive for joint swelling. Negative for arthralgias, back pain and neck pain.   Skin: Positive for color change and wound. Negative for rash.    Neurological: Negative for dizziness, weakness, light-headedness and headaches.   Hematological: Negative for adenopathy.   Psychiatric/Behavioral: Negative for agitation, behavioral problems and confusion.     Objective:     Vital Signs (Most Recent):  Temp: 98 °F (36.7 °C) (02/13/20 1610)  Pulse: 77 (02/13/20 1610)  Resp: 18 (02/13/20 1610)  BP: (!) 168/74 (02/13/20 1610)  SpO2: 95 % (02/13/20 1610) Vital Signs (24h Range):  Temp:  [96.4 °F (35.8 °C)-98.1 °F (36.7 °C)] 98 °F (36.7 °C)  Pulse:  [66-85] 77  Resp:  [16-21] 18  SpO2:  [95 %-100 %] 95 %  BP: (141-183)/(65-83) 168/74     Weight: 79.4 kg (175 lb)  Body mass index is 28.25 kg/m².    Estimated Creatinine Clearance: 69.5 mL/min (based on SCr of 0.8 mg/dL).    Physical Exam   Constitutional: She is oriented to person, place, and time. She appears well-developed and well-nourished. No distress.   HENT:   Head: Normocephalic and atraumatic.   Eyes: Conjunctivae are normal. No scleral icterus.   Neck: Normal range of motion.   Cardiovascular: Normal rate and regular rhythm.   No murmur heard.  Pulmonary/Chest: Effort normal and breath sounds normal. No respiratory distress. She has no wheezes. She has no rales.   Abdominal: Soft. She exhibits no distension. There is no tenderness.   Musculoskeletal: She exhibits edema. She exhibits no tenderness.   Left foot wrapped and unable to examine.  Mild ankle swelling/warmth.    Neurological: She is alert and oriented to person, place, and time.   Skin: Skin is warm and dry. She is not diaphoretic.   Psychiatric: She has a normal mood and affect. Her behavior is normal.   Vitals reviewed.      Significant Labs:   Blood Culture:   Recent Labs   Lab 02/12/20 1743 02/12/20  1918   LABBLOO No Growth to date No Growth to date     CBC:   Recent Labs   Lab 02/12/20 1743 02/13/20  0340   WBC 7.72 6.73   HGB 11.3* 9.9*   HCT 36.3* 31.5*    203     CMP:   Recent Labs   Lab 02/12/20  1743 02/13/20  0340    139   K  4.1 3.9    106   CO2 28 26   * 163*   BUN 14 10   CREATININE 1.0 0.8   CALCIUM 10.1 9.2   PROT 8.0 6.9   ALBUMIN 3.4* 3.0*   BILITOT 0.2 0.3   ALKPHOS 92 78   AST 18 16   ALT 13 12   ANIONGAP 9 7*   EGFRNONAA 57.2* >60.0     Wound Culture: No results for input(s): LABAERO in the last 4320 hours.    Significant Imaging: I have reviewed all pertinent imaging results/findings within the past 24 hours.

## 2020-02-13 NOTE — PROGRESS NOTES
[facility] Hospital Medicine  Progress Note    Patient Name: Ana Maria Souza  MRN: [id number]  Patient Class: IP- Inpatient   Admission Date: 2/12/2020  Length of Stay: 1 days  Attending Physician: Bee Jasso MD  Primary Care Provider: Primary Doctor [facility] Medicine Team: [facility] HOSP MED  Bee Jasso MD    Subjective:     Principal Problem:Diabetic foot infection    Interval History: Patient received one dose of vancomycin and zosyn. MRI with gas and abscess in conjunction with osteomyelitis of foot. Patient made NPO in am however may have eaten a lemon this AM. Very resistant to surgery--very clear with patient that surgery is imperative to infection control.     Review of Systems   Constitutional: Positive for chills and fatigue. Negative for fever.   HENT: Negative for congestion and trouble swallowing.    Eyes: Negative for discharge and itching.   Respiratory: Negative for apnea, cough, chest tightness and shortness of breath.    Cardiovascular: Negative for chest pain and leg swelling.   Gastrointestinal: Negative for abdominal distention and abdominal pain.   Endocrine: Negative for cold intolerance, polydipsia and polyphagia.   Genitourinary: Negative for difficulty urinating, dysuria and flank pain.   Musculoskeletal: Negative for arthralgias and back pain.   Neurological: Negative for dizziness, syncope, facial asymmetry and light-headedness.   Hematological: Does not bruise/bleed easily.   Psychiatric/Behavioral: Negative for decreased concentration and hallucinations.     Objective:     Vital Signs (Most Recent):  Temp: 96.4 °F (35.8 °C) (02/13/20 1201)  Pulse: 79 (02/13/20 1529)  Resp: 20 (02/13/20 1201)  BP: (!) 183/79 (02/13/20 1201)  SpO2: 96 % (02/13/20 1201) Vital Signs (24h Range):  Temp:  [96.4 °F (35.8 °C)-98.1 °F (36.7 °C)] 96.4 °F (35.8 °C)  Pulse:  [66-85] 79  Resp:  [16-21] 20  SpO2:  [96 %-100 %] 96 %  BP: (141-183)/(65-83) 183/79     Weight:  79.4 kg (175 lb)  Body mass index is 28.25 kg/m².  No intake or output data in the 24 hours ending 02/13/20 1602   Constitutional: Appears well developed and well nourished.  Foul smelling odor extending into the hallway  Head: Normocephalic and atraumatic.   Mouth/Throat: Oropharynx is clear and moist.   Eyes: EOM are normal. Pupils are equal, round, and reactive to light. No scleral icterus.   Neck: Normal range of motion. Neck supple.   Cardiovascular: Normal heart rate.  Regular heart rhythm.  No murmur heard.  Pulmonary/Chest: Effort normal. No respiratory distress. No wheezes, rales, or rhonchi  Abdominal: Soft. Bowel sounds are normal.  No distension.  No tenderness  Musculoskeletal: Normal range of motion. Left foot wrapped with foul smelling odor  Neurological: Alert and oriented to person, place, and time.   Skin: Skin is warm and dry.   Psychiatric: Normal mood and affect. Behavior is normal.          Overview/Hospital Course:   History of Present Illness:     Ms. Ana Maria Souza is a 69 y.o. female with T2DM not on medication, who presents to the ED for evaluation of a foul smelling left foot wound.  She mentons that about 3 months prior to admission, she stepped on a nail.  She didn't seek medical care at that time, but she developed a small found on the dorsum of her left foot.  She started to use OTC creams about 2 weeks ago when she saw that the wound was not healing and seemed to be opening more.  She was using Silver Sulfadiazine and Ammonia Acetate creams, and the wound began to harden with a thick eschar over the next few days.  She then pulled off the eschar, and noticed white purulent material was produced along with a foul smelling odor.  Over time, she has noticed some swelling and erythema extending to her left leg.  She decided to wait and go to the ER once her Medicaid was finalized.  She denies any fevers or chills.  She denies any difficulty with ambulation.  She has been taking  Tylenol and Tylenol Extra Strength for pain.     Past Medical History: Patient has no past medical history on file.        Past Surgical History: Patient has a past surgical history that includes Tympanoplasty.        Social History: Patient reports that she has never smoked. She has never used smokeless tobacco. She reports that she does not use drugs.        Family History: Patient's family history is not on file.     Significant Labs:   A1C:   Recent Labs   Lab 02/12/20  1928   HGBA1C 7.6*     CBC:   Recent Labs   Lab 02/12/20  1743 02/13/20  0340   WBC 7.72 6.73   HGB 11.3* 9.9*   HCT 36.3* 31.5*    203       Significant Imaging: I have reviewed all pertinent imaging results/findings within the past 24 hours.  MRI with gas and abscess of foot. Left side    Assessment/Plan:      Active Diagnoses:    Diagnosis Date Noted POA    PRINCIPAL PROBLEM:  Diabetic foot infection [E11.628, L08.9] 02/12/2020 Yes    Acute osteomyelitis of toe of left foot [M86.172] 02/13/2020 Unknown    Chronic pain of left ankle [M25.572, G89.29] 02/13/2020 Unknown    Type 2 diabetes mellitus, without long-term current use of insulin [E11.9] 02/12/2020 Yes    Essential hypertension [I10] 02/12/2020 Yes             Diabetic Foot Wound  · Afebrile and without leukocytosis.  ESR 88, CRP 7  · HbA1c 7.6  · MRI with left forefoot with associated heterogeneous region of central non-enhancement concerning for abscess or necrotic tissue with dimensions as above.  There are scattered foci of susceptibility artifact throughout this region concerning for soft tissue gas. Abnormal marrow edema and enhancement involving the 5th metatarsal and phalanges as well as the proximal and middle phalanges of the 4th toe concerning for associated osteomyelitis.  · Podiatry consulted, appreciate assistance. Most likely need surgical intervention.   · ID consulted, appreciate assistance.  Hold on IV antibiotics for now given clinical  stability     Essential Hypertension  · Chronic issue but not on medication at home  · Start Lisinopril 10mg.  Titrate up as needed     Type 2 DM without Long Term Insulin Use  · HbA1c 7.6  · Not on medication at home  · SSI with POCT accuchecks and Diabetic diet        Diet:  NPO   VTE PPx:  Ambulatory  Goals of Care:  Full       Disposition:  Pending Podiatry and ID recs  Discharge Needs:  TBD    Bee Jasso MD  Department of Hospital Medicine   [facility]

## 2020-02-13 NOTE — H&P
Hospital Medicine  History and Physical      Patient Name: Ana Maria Souza  MRN:  [id number]  [facility] Medicine Team: [facility] MED  Jeanne Brandt MD  Date of Admission:  2/12/2020     Principal Problem:  Diabetic foot infection   Primary Care Physician: No primary care provider on file.       History of Present Illness:    Ms. Ana Maria Souza is a 69 y.o. female with T2DM not on medication, who presents to the ED for evaluation of a foul smelling left foot wound.  She mentons that about 3 months prior to admission, she stepped on a nail.  She didn't seek medical care at that time, but she developed a small found on the dorsum of her left foot.  She started to use OTC creams about 2 weeks ago when she saw that the wound was not healing and seemed to be opening more.  She was using Silver Sulfadiazine and Ammonia Acetate creams, and the wound began to harden with a thick eschar over the next few days.  She then pulled off the eschar, and noticed white purulent material was produced along with a foul smelling odor.  Over time, she has noticed some swelling and erythema extending to her left leg.  She decided to wait and go to the ER once her Medicaid was finalized.  She denies any fevers or chills.  She denies any difficulty with ambulation.  She has been taking Tylenol and Tylenol Extra Strength for pain.    Upon arrival to the ED, vitals were /75 and HR 98.  Labs showed WBC 7 with ESR 88 and CRP 7 with Lactic 1.2.  XR of the foot was ordered which showed osseous erosion involving the distal 5th metatarsal, proximal and middle phalanx of the 5th digit and proximal phalanx of the 4th digit with soft tissue thickening lateral to the 5th metatarsal with probable soft tissue air, suggestive of cellulitis with possible abscess and osteomyelitis.  She was given Vanc and Zosyn.  Podiatry was consulted, who suggested MRI for further evaluation.  She was admitted to Hospital Medicine for further  management.        Review of Systems:  Constitutional: Negative for chills, fatigue, fever.   HENT: Negative for sore throat, trouble swallowing.    Eyes: Negative for photophobia, visual disturbance.   Respiratory: Negative for cough, shortness of breath.    Cardiovascular: Negative for chest pain, palpitations, leg swelling.   Gastrointestinal: Negative for abdominal pain, nausea, vomiting, diarrhea, constipation  Endocrine: Negative for cold intolerance, heat intolerance.   Genitourinary: Negative for dysuria, frequency.   Musculoskeletal: Negative for arthralgias, myalgias.   Skin: + wound, erythema  Neurological: Negative for dizziness, syncope, weakness, light-headedness.   Psychiatric/Behavioral: Negative for confusion, hallucinations, anxiety  All other systems reviewed and are negative.      Past Medical History: Patient has no past medical history on file.      Past Surgical History: Patient has a past surgical history that includes Tympanoplasty.      Social History: Patient reports that she has never smoked. She has never used smokeless tobacco. She reports that she does not use drugs.      Family History: Patient's family history is not on file.      Medications: Scheduled Meds:   lisinopril  10 mg Oral Daily    vancomycin (VANCOCIN) IVPB  2,000 mg Intravenous ED 1 Time     Continuous Infusions:  PRN Meds:.acetaminophen, Dextrose 10% Bolus, Dextrose 10% Bolus, glucagon (human recombinant), glucose, glucose, HYDROcodone-acetaminophen, HYDROcodone-acetaminophen, melatonin, ondansetron, promethazine (PHENERGAN) IVPB, senna-docusate 8.6-50 mg, sodium chloride 0.9%, DIPH,PERTUS (ADACEL),TETANUS PF VAC (ADULT)      Allergies: Patient has No Known Allergies.      Physical Exam:    Temp:  [98.9 °F (37.2 °C)]   Pulse:  [98]   Resp:  [16]   BP: (161)/(75)   SpO2:  [98 %]     Constitutional: Appears well developed and well nourished.  Foul smelling odor extending into the hallway  Head: Normocephalic and  atraumatic.   Mouth/Throat: Oropharynx is clear and moist.   Eyes: EOM are normal. Pupils are equal, round, and reactive to light. No scleral icterus.   Neck: Normal range of motion. Neck supple.   Cardiovascular: Normal heart rate.  Regular heart rhythm.  No murmur heard.  Pulmonary/Chest: Effort normal. No respiratory distress. No wheezes, rales, or rhonchi  Abdominal: Soft. Bowel sounds are normal.  No distension.  No tenderness  Musculoskeletal: Normal range of motion. Left foot wrapped with foul smelling odor  Neurological: Alert and oriented to person, place, and time.   Skin: Skin is warm and dry.   Psychiatric: Normal mood and affect. Behavior is normal.       No intake or output data in the 24 hours ending 02/12/20 2034  Recent Labs   Lab 02/12/20  1743   WBC 7.72   HGB 11.3*   HCT 36.3*        Recent Labs   Lab 02/12/20  1743      K 4.1      CO2 28   BUN 14   CREATININE 1.0   *   CALCIUM 10.1   MG 1.9   PHOS 2.9     Recent Labs   Lab 02/12/20  1743   ALKPHOS 92   ALT 13   AST 18   ALBUMIN 3.4*   PROT 8.0   BILITOT 0.2      Recent Labs     02/12/20  1743   LACTATE 1.2      No results for input(s): CPK, CPKMB, MB, TROPONINI in the last 72 hours.      Assessment and Plan:    Ms. Ana Maria Souza is a 69 y.o. female who presented to [facility] on 2/12/2020 with a diabetic foot infection.    Diabetic Foot Wound  · Afebrile and without leukocytosis.  ESR 88, CRP 7  · HbA1c pending  · XR with osseous erosion involving the distal 5th metatarsal, proximal and middle phalanx of the 5th digit and proximal phalanx of the 4th digit with soft tissue thickening lateral to the 5th metatarsal with probable soft tissue air, suggestive of cellulitis with possible abscess and osteomyelitis.    · MRI with pending  · Podiatry consulted, appreciate assistance  · ID consulted, appreciate assistance.  Hold on IV antibiotics for now given clinical stability    Essential Hypertension  · Chronic issue but not  on medication at home  · Start Lisinopril 10mg.  Titrate up as needed    Type 2 DM without Long Term Insulin Use  · HbA1c pending  · Not on medication at home  · SSI with POCT accuchecks and Diabetic diet      Diet:  Diabetic  VTE PPx:  Ambulatory  Goals of Care:  Full      Disposition:  Pending Podiatry and ID recs  Discharge Needs:  TBSTEVE Brandt MD  [facility] Medicine  Cell:  [phone]  Spectra:  [phone]

## 2020-02-14 PROBLEM — L97.509 FOOT ULCER: Status: ACTIVE | Noted: 2020-02-14

## 2020-02-14 LAB
ALBUMIN SERPL BCP-MCNC: 3.2 G/DL (ref 3.5–5.2)
ALP SERPL-CCNC: 79 U/L (ref 55–135)
ALT SERPL W/O P-5'-P-CCNC: 11 U/L (ref 10–44)
ANION GAP SERPL CALC-SCNC: 8 MMOL/L (ref 8–16)
AST SERPL-CCNC: 16 U/L (ref 10–40)
BASOPHILS # BLD AUTO: 0.02 K/UL (ref 0–0.2)
BASOPHILS NFR BLD: 0.3 % (ref 0–1.9)
BILIRUB SERPL-MCNC: 0.4 MG/DL (ref 0.1–1)
BUN SERPL-MCNC: 8 MG/DL (ref 8–23)
CALCIUM SERPL-MCNC: 9.4 MG/DL (ref 8.7–10.5)
CHLORIDE SERPL-SCNC: 103 MMOL/L (ref 95–110)
CO2 SERPL-SCNC: 26 MMOL/L (ref 23–29)
CREAT SERPL-MCNC: 0.8 MG/DL (ref 0.5–1.4)
DIFFERENTIAL METHOD: ABNORMAL
EOSINOPHIL # BLD AUTO: 0 K/UL (ref 0–0.5)
EOSINOPHIL NFR BLD: 0 % (ref 0–8)
ERYTHROCYTE [DISTWIDTH] IN BLOOD BY AUTOMATED COUNT: 13.7 % (ref 11.5–14.5)
EST. GFR  (AFRICAN AMERICAN): >60 ML/MIN/1.73 M^2
EST. GFR  (NON AFRICAN AMERICAN): >60 ML/MIN/1.73 M^2
GLUCOSE SERPL-MCNC: 230 MG/DL (ref 70–110)
HCT VFR BLD AUTO: 34.4 % (ref 37–48.5)
HGB BLD-MCNC: 11.1 G/DL (ref 12–16)
IMM GRANULOCYTES # BLD AUTO: 0.02 K/UL (ref 0–0.04)
IMM GRANULOCYTES NFR BLD AUTO: 0.3 % (ref 0–0.5)
LYMPHOCYTES # BLD AUTO: 0.9 K/UL (ref 1–4.8)
LYMPHOCYTES NFR BLD: 11.5 % (ref 18–48)
MAGNESIUM SERPL-MCNC: 1.9 MG/DL (ref 1.6–2.6)
MCH RBC QN AUTO: 30 PG (ref 27–31)
MCHC RBC AUTO-ENTMCNC: 32.3 G/DL (ref 32–36)
MCV RBC AUTO: 93 FL (ref 82–98)
MONOCYTES # BLD AUTO: 0.3 K/UL (ref 0.3–1)
MONOCYTES NFR BLD: 3.1 % (ref 4–15)
NEUTROPHILS # BLD AUTO: 6.8 K/UL (ref 1.8–7.7)
NEUTROPHILS NFR BLD: 84.8 % (ref 38–73)
NRBC BLD-RTO: 0 /100 WBC
PHOSPHATE SERPL-MCNC: 3.3 MG/DL (ref 2.7–4.5)
PLATELET # BLD AUTO: 232 K/UL (ref 150–350)
PMV BLD AUTO: 10.9 FL (ref 9.2–12.9)
POCT GLUCOSE: 154 MG/DL (ref 70–110)
POCT GLUCOSE: 200 MG/DL (ref 70–110)
POCT GLUCOSE: 211 MG/DL (ref 70–110)
POTASSIUM SERPL-SCNC: 4.2 MMOL/L (ref 3.5–5.1)
PROT SERPL-MCNC: 7.4 G/DL (ref 6–8.4)
RBC # BLD AUTO: 3.7 M/UL (ref 4–5.4)
SODIUM SERPL-SCNC: 137 MMOL/L (ref 136–145)
VANCOMYCIN TROUGH SERPL-MCNC: 18.1 UG/ML (ref 10–22)
WBC # BLD AUTO: 7.97 K/UL (ref 3.9–12.7)

## 2020-02-14 PROCEDURE — 80202 ASSAY OF VANCOMYCIN: CPT

## 2020-02-14 PROCEDURE — 25000003 PHARM REV CODE 250: Performed by: HOSPITALIST

## 2020-02-14 PROCEDURE — 85025 COMPLETE CBC W/AUTO DIFF WBC: CPT

## 2020-02-14 PROCEDURE — 25000003 PHARM REV CODE 250: Performed by: INTERNAL MEDICINE

## 2020-02-14 PROCEDURE — 11000001 HC ACUTE MED/SURG PRIVATE ROOM

## 2020-02-14 PROCEDURE — 84100 ASSAY OF PHOSPHORUS: CPT

## 2020-02-14 PROCEDURE — 63600175 PHARM REV CODE 636 W HCPCS: Performed by: INTERNAL MEDICINE

## 2020-02-14 PROCEDURE — 99233 PR SUBSEQUENT HOSPITAL CARE,LEVL III: ICD-10-PCS | Mod: ,,, | Performed by: NURSE PRACTITIONER

## 2020-02-14 PROCEDURE — 99024 PR POST-OP FOLLOW-UP VISIT: ICD-10-PCS | Mod: ,,, | Performed by: PODIATRIST

## 2020-02-14 PROCEDURE — 99233 SBSQ HOSP IP/OBS HIGH 50: CPT | Mod: ,,, | Performed by: NURSE PRACTITIONER

## 2020-02-14 PROCEDURE — 99232 SBSQ HOSP IP/OBS MODERATE 35: CPT | Mod: ,,, | Performed by: HOSPITALIST

## 2020-02-14 PROCEDURE — 80053 COMPREHEN METABOLIC PANEL: CPT

## 2020-02-14 PROCEDURE — 99024 POSTOP FOLLOW-UP VISIT: CPT | Mod: ,,, | Performed by: PODIATRIST

## 2020-02-14 PROCEDURE — 63600175 PHARM REV CODE 636 W HCPCS: Performed by: HOSPITALIST

## 2020-02-14 PROCEDURE — 83735 ASSAY OF MAGNESIUM: CPT

## 2020-02-14 PROCEDURE — 36415 COLL VENOUS BLD VENIPUNCTURE: CPT

## 2020-02-14 PROCEDURE — 99232 PR SUBSEQUENT HOSPITAL CARE,LEVL II: ICD-10-PCS | Mod: ,,, | Performed by: HOSPITALIST

## 2020-02-14 RX ORDER — BISACODYL 10 MG
10 SUPPOSITORY, RECTAL RECTAL ONCE
Status: DISCONTINUED | OUTPATIENT
Start: 2020-02-14 | End: 2020-02-14

## 2020-02-14 RX ORDER — SODIUM CHLORIDE 9 MG/ML
INJECTION, SOLUTION INTRAVENOUS CONTINUOUS
Status: DISCONTINUED | OUTPATIENT
Start: 2020-02-14 | End: 2020-02-15

## 2020-02-14 RX ORDER — POLYETHYLENE GLYCOL 3350 17 G/17G
17 POWDER, FOR SOLUTION ORAL DAILY
Status: DISCONTINUED | OUTPATIENT
Start: 2020-02-14 | End: 2020-02-14

## 2020-02-14 RX ADMIN — CEFEPIME 2 G: 2 INJECTION, POWDER, FOR SOLUTION INTRAVENOUS at 01:02

## 2020-02-14 RX ADMIN — SODIUM CHLORIDE: 0.9 INJECTION, SOLUTION INTRAVENOUS at 12:02

## 2020-02-14 RX ADMIN — VANCOMYCIN HYDROCHLORIDE 1250 MG: 1.25 INJECTION, POWDER, LYOPHILIZED, FOR SOLUTION INTRAVENOUS at 10:02

## 2020-02-14 RX ADMIN — HYDROCODONE BITARTRATE AND ACETAMINOPHEN 1 TABLET: 10; 325 TABLET ORAL at 02:02

## 2020-02-14 RX ADMIN — VANCOMYCIN HYDROCHLORIDE 1250 MG: 1.25 INJECTION, POWDER, LYOPHILIZED, FOR SOLUTION INTRAVENOUS at 12:02

## 2020-02-14 RX ADMIN — CEFEPIME 2 G: 2 INJECTION, POWDER, FOR SOLUTION INTRAVENOUS at 10:02

## 2020-02-14 RX ADMIN — METRONIDAZOLE 500 MG: 500 TABLET ORAL at 02:02

## 2020-02-14 RX ADMIN — METRONIDAZOLE 500 MG: 500 TABLET ORAL at 10:02

## 2020-02-14 RX ADMIN — HYDROCODONE BITARTRATE AND ACETAMINOPHEN 1 TABLET: 10; 325 TABLET ORAL at 07:02

## 2020-02-14 RX ADMIN — CEFEPIME 2 G: 2 INJECTION, POWDER, FOR SOLUTION INTRAVENOUS at 05:02

## 2020-02-14 RX ADMIN — LISINOPRIL 10 MG: 10 TABLET ORAL at 09:02

## 2020-02-14 RX ADMIN — METRONIDAZOLE 500 MG: 500 TABLET ORAL at 09:02

## 2020-02-14 RX ADMIN — DOCUSATE SODIUM - SENNOSIDES 1 TABLET: 50; 8.6 TABLET, FILM COATED ORAL at 09:02

## 2020-02-14 RX ADMIN — HYDROCODONE BITARTRATE AND ACETAMINOPHEN 1 TABLET: 5; 325 TABLET ORAL at 09:02

## 2020-02-14 NOTE — ANESTHESIA PREPROCEDURE EVALUATION
[facility]  Anesthesia Pre-Operative Evaluation         Patient Name: Ana Maria Souza  YOB: 1950  MRN: [id number]    SUBJECTIVE:     Pre-operative evaluation for Procedure(s) (LRB):  INCISION AND DRAINAGE, FOOT (Left)     02/13/2020    Ana Maria Souza is a 69 y.o. female w/ a significant PMHx of HTN and T2DM c/b vasculopathy. Admitted for LEFT diabetic foot wound.    Patient now presents for the above procedure(s).      LDA:       Peripheral IV - Single Lumen 02/12/20 1737 20 G Left Antecubital (Active)   Site Assessment Clean;Dry;Intact;No redness;No swelling 2/13/2020  8:00 AM   Line Status Saline locked 2/13/2020  8:00 AM   Dressing Status Clean;Dry;Intact 2/13/2020  8:00 AM   Number of days: 1           Prev airway: None documented.    Drips: None documented.      Patient Active Problem List   Diagnosis    Diabetic foot infection    Type 2 diabetes mellitus, without long-term current use of insulin    Essential hypertension    Acute osteomyelitis of toe of left foot    Chronic pain of left ankle       Review of patient's allergies indicates:  No Known Allergies    Current Inpatient Medications:   ceFEPime (MAXIPIME) IVPB  2 g Intravenous Q8H    lisinopril  10 mg Oral Daily    metroNIDAZOLE  500 mg Oral TID    [START ON 2/14/2020] senna-docusate 8.6-50 mg  1 tablet Oral Daily    vancomycin (VANCOCIN) IVPB  15 mg/kg Intravenous Q12H       No current facility-administered medications on file prior to encounter.      Current Outpatient Medications on File Prior to Encounter   Medication Sig Dispense Refill    aspirin 325 MG tablet Take 325 mg by mouth once daily.         Past Surgical History:   Procedure Laterality Date    TYMPANOPLASTY         Social History     Socioeconomic History    Marital status: Single     Spouse name: Not on file    Number of children: Not on file    Years of education: Not on file    Highest education level: Not on file   Occupational  History    Not on file   Social Needs    Financial resource strain: Not on file    Food insecurity:     Worry: Not on file     Inability: Not on file    Transportation needs:     Medical: Not on file     Non-medical: Not on file   Tobacco Use    Smoking status: Never Smoker    Smokeless tobacco: Never Used   Substance and Sexual Activity    Alcohol use: Not on file     Comment: 3 glasses of wine/ week    Drug use: Never    Sexual activity: Not on file   Lifestyle    Physical activity:     Days per week: Not on file     Minutes per session: Not on file    Stress: Not on file   Relationships    Social connections:     Talks on phone: Not on file     Gets together: Not on file     Attends [employer] service: Not on file     Active member of club or organization: Not on file     Attends meetings of clubs or organizations: Not on file     Relationship status: Not on file   Other Topics Concern    Not on file   Social History Narrative    Not on file       OBJECTIVE:     Vital Signs Range (Last 24H):  Temp:  [35.8 °C (96.4 °F)-36.7 °C (98.1 °F)]   Pulse:  [66-85]   Resp:  [16-21]   BP: (141-183)/(65-83)   SpO2:  [95 %-100 %]       Significant Labs:  Lab Results   Component Value Date    WBC 6.73 02/13/2020    HGB 9.9 (L) 02/13/2020    HCT 31.5 (L) 02/13/2020     02/13/2020    CHOL 173 02/13/2020    TRIG 75 02/13/2020    HDL 41 02/13/2020    ALT 12 02/13/2020    AST 16 02/13/2020     02/13/2020    K 3.9 02/13/2020     02/13/2020    CREATININE 0.8 02/13/2020    BUN 10 02/13/2020    CO2 26 02/13/2020    HGBA1C 7.6 (H) 02/12/2020       Diagnostic Studies: No relevant studies.    EKG:   No results found for this or any previous visit.    2D ECHO:  TTE:  No results found for this or any previous visit.    LISA:  No results found for this or any previous visit.    ASSESSMENT/PLAN:         Anesthesia Evaluation    I have reviewed the Patient Summary Reports.     I have reviewed the Medications.      Review of Systems  Anesthesia Hx:  No previous Anesthesia  Neg history of prior surgery. Denies Family Hx of Anesthesia complications.    Hematology/Oncology:         -- Anemia:   EENT/Dental:EENT/Dental Normal   Cardiovascular:   Hypertension    Pulmonary:  Pulmonary Normal    Renal/:  Renal/ Normal     Hepatic/GI:  Hepatic/GI Normal    Musculoskeletal:  Musculoskeletal Normal    Neurological:  Neurology Normal    Endocrine:   Diabetes, poorly controlled, type 2        Physical Exam  General:  Well nourished    Airway/Jaw/Neck:  Airway Findings: Mouth Opening: Normal Tongue: Normal  Mallampati: II  TM Distance: Normal, at least 6 cm  Jaw/Neck Findings:  Neck ROM: Normal ROM      Dental:  Dental Findings: Edentulous   Chest/Lungs:  Chest/Lungs Findings: Normal Respiratory Rate     Heart/Vascular:  Heart Findings: Rate: Normal  Rhythm: Regular Rhythm        Mental Status:  Mental Status Findings:  Cooperative, Alert and Oriented         Anesthesia Plan  Type of Anesthesia, risks & benefits discussed:  Anesthesia Type:  general, MAC  Patient's Preference:   Intra-op Monitoring Plan: standard ASA monitors  Intra-op Monitoring Plan Comments:   Post Op Pain Control Plan: per primary service following discharge from PACU, IV/PO Opioids PRN and multimodal analgesia  Post Op Pain Control Plan Comments:   Induction:   IV  Beta Blocker:  Patient is not currently on a Beta-Blocker (No further documentation required).       Informed Consent: Patient understands risks and agrees with Anesthesia plan.  Questions answered. Anesthesia consent signed with patient.  ASA Score: 3     Day of Surgery Review of History & Physical:    H&P update referred to the provider.         Ready For Surgery From Anesthesia Perspective.

## 2020-02-14 NOTE — PROGRESS NOTES
Pharmacokinetic Assessment Follow Up: IV Vancomycin    Vancomycin serum concentration assessment(s):    The trough level was drawn correctly and can be used to guide therapy at this time. The measurement is within the desired definitive target range of 15 to 20 mcg/mL.    Vancomycin Regimen Plan:    Continue regimen to Vancomycin 1250 mg IV every 12 hours with next serum trough concentration measured at 1000 prior to 4th dose on 2/16      Drug levels (last 3 results):  Recent Labs   Lab Result Units 02/14/20  1056   Vancomycin-Trough ug/mL 18.1       Pharmacy will continue to follow and monitor vancomycin.    Please contact pharmacy at extension [phone] for questions regarding this assessment.    Thank you for the consult,   Winter Figueroa       Patient brief summary:  Ana Maria Souza is a 69 y.o. female initiated on antimicrobial therapy with IV Vancomycin for treatment of bone/joint infection    The patient's current regimen is Vancomycin 1250 mg q12h    Drug Allergies:   Review of patient's allergies indicates:  No Known Allergies    Actual Body Weight:   67.3 kg    Renal Function:   Estimated Creatinine Clearance: 69.5 mL/min (based on SCr of 0.8 mg/dL).,     Dialysis Method (if applicable):  N/A    CBC (last 72 hours):  Recent Labs   Lab Result Units 02/12/20 1743 02/12/20 1928 02/13/20 0340 02/14/20  0354   WBC K/uL 7.72  --  6.73 7.97   Hemoglobin g/dL 11.3*  --  9.9* 11.1*   Hemoglobin A1C %  --  7.6*  --   --    Hematocrit % 36.3*  --  31.5* 34.4*   Platelets K/uL 228  --  203 232   Gran% % 47.4  --  43.4 84.8*   Lymph% % 40.8  --  39.1 11.5*   Mono% % 7.4  --  12.6 3.1*   Eosinophil% % 3.6  --  4.0 0.0   Basophil% % 0.5  --  0.6 0.3   Differential Method  Automated  --  Automated Automated       Metabolic Panel (last 72 hours):  Recent Labs   Lab Result Units 02/12/20 1743 02/12/20 1919 02/13/20 0340 02/14/20  0354   Sodium mmol/L 138  --  139 137   Potassium mmol/L 4.1  --  3.9 4.2   Chloride  mmol/L 101  --  106 103   CO2 mmol/L 28  --  26 26   Glucose mg/dL 175*  --  163* 230*   Glucose, UA   --  Negative  --   --    BUN, Bld mg/dL 14  --  10 8   Creatinine mg/dL 1.0  --  0.8 0.8   Albumin g/dL 3.4*  --  3.0* 3.2*   Total Bilirubin mg/dL 0.2  --  0.3 0.4   Alkaline Phosphatase U/L 92  --  78 79   AST U/L 18  --  16 16   ALT U/L 13  --  12 11   Magnesium mg/dL 1.9  --  1.9 1.9   Phosphorus mg/dL 2.9  --  3.0 3.3       Vancomycin Administrations:  vancomycin given in the last 96 hours                   vancomycin 1.25 g in dextrose 5% 250 mL IVPB (ready to mix) (mg) 1,250 mg New Bag 02/14/20 1222     1,250 mg New Bag 02/13/20 2326     1,250 mg New Bag  1204    vancomycin 2 g in 0.9% sodium chloride 500 mL IVPB (mg) 2,000 mg New Bag 02/12/20 2119                Microbiologic Results:  Microbiology Results (last 7 days)     Procedure Component Value Units Date/Time    Aerobic culture [[id number]]  (Abnormal) Collected:  02/13/20 1030    Order Status:  Completed Specimen:  Wound from Foot, Left Updated:  02/14/20 1333     Aerobic Bacterial Culture ESCHERICHIA COLI  Moderate  Susceptibility pending      Culture, Anaerobe [[id number]] Collected:  02/13/20 1030    Order Status:  Completed Specimen:  Wound from Foot, Left Updated:  02/14/20 0715     Anaerobic Culture Culture in progress    Gram stain [[id number]] Collected:  02/13/20 2046    Order Status:  Completed Specimen:  Wound from Foot, Left Updated:  02/13/20 2304     Gram Stain Result No WBC's      Rare Gram positive cocci    Narrative:       Left foot wound    Gram stain [[id number]] Collected:  02/13/20 2046    Order Status:  Completed Specimen:  Wound from Foot, Left Updated:  02/13/20 2256     Gram Stain Result Rare WBC's      Moderate Gram positive cocci    Blood culture x two cultures. Draw prior to antibiotics [[id number]] Collected:  02/12/20 1918    Order Status:  Completed Specimen:  Blood from Peripheral, Hand, Right Updated:  02/13/20 2018      Blood Culture, Routine No Growth to date      No Growth to date    Narrative:       Aerobic and anaerobic    AFB Culture & Smear [[id number]] Collected:  02/13/20 2046    Order Status:  Sent Specimen:  Wound from Foot, Left Updated:  02/13/20 2130    Fungus culture [[id number]] Collected:  02/13/20 2046    Order Status:  Sent Specimen:  Wound from Foot, Left Updated:  02/13/20 2125    Culture, Anaerobic [[id number]] Collected:  02/13/20 2046    Order Status:  Sent Specimen:  Wound from Foot, Left Updated:  02/13/20 2124    AFB Culture & Smear [[id number]] Collected:  02/13/20 2046    Order Status:  Sent Specimen:  Wound from Foot, Left Updated:  02/13/20 2114    Culture, Anaerobic [[id number]] Collected:  02/13/20 2046    Order Status:  Sent Specimen:  Wound from Foot, Left Updated:  02/13/20 2114    Fungus culture [[id number]] Collected:  02/13/20 2046    Order Status:  Sent Specimen:  Wound from Foot, Left Updated:  02/13/20 2113    Aerobic culture [[id number]] Collected:  02/13/20 2046    Order Status:  Sent Specimen:  Wound from Foot, Left Updated:  02/13/20 2112    Aerobic culture [[id number]] Collected:  02/13/20 2046    Order Status:  Sent Specimen:  Wound from Foot, Left Updated:  02/13/20 2046    Gram stain [[id number]] Collected:  02/13/20 1030    Order Status:  Completed Specimen:  Wound Updated:  02/13/20 2029     Gram Stain Result Moderate WBC's      Many Gram positive cocci      Many Gram negative rods    Blood culture x two cultures. Draw prior to antibiotics [[id number]] Collected:  02/12/20 1743    Order Status:  Completed Specimen:  Blood from Peripheral, Antecubital, Left Updated:  02/13/20 2012     Blood Culture, Routine No Growth to date      No Growth to date    Narrative:       Aerobic and anaerobic    Gram stain [[id number]]     Order Status:  Completed Specimen:  Wound from Foot, Left

## 2020-02-14 NOTE — TRANSFER OF CARE
"Anesthesia Transfer of Care Note    Patient: Ana Maria Souza    Procedure(s) Performed: Procedure(s) (LRB):  INCISION AND DRAINAGE, FOOT (Left)    Patient location: PACU    Anesthesia Type: general    Transport from OR: Transported from OR on 6-10 L/min O2 by face mask with adequate spontaneous ventilation    Post pain: adequate analgesia    Post assessment: no apparent anesthetic complications and tolerated procedure well    Post vital signs: stable    Level of consciousness: responds to stimulation and sedated    Nausea/Vomiting: no nausea/vomiting    Complications: none    Transfer of care protocol was followed      Last vitals:   Visit Vitals  BP (!) (P) 80/50   Pulse 72   Temp 36.7 °C (98 °F)   Resp (P) 18   Ht 5' 6" (1.676 m)   Wt 79.4 kg (175 lb)   SpO2 95%   Breastfeeding? No   BMI 28.25 kg/m²     "

## 2020-02-14 NOTE — OP NOTE
Operative Note       Surgery Date: 2/13/2020     Surgeon(s) and Role:     * Mau Peterson DPM - Primary     * Elliott Lassiter MD - Resident - Assisting    Pre-op Diagnosis:  Infection [B99.9]  Wound infection [T14.8XXA, L08.9]  Cellulitis, unspecified cellulitis site [L03.90]  Osteomyelitis of left foot, unspecified type [M86.9]    Post-op Diagnosis: Post-Op Diagnosis Codes:     * Infection [B99.9]     * Wound infection [T14.8XXA, L08.9]     * Cellulitis, unspecified cellulitis site [L03.90]     * Osteomyelitis of left foot, unspecified type [M86.9]    Procedure(s) (LRB):  INCISION AND DRAINAGE, FOOT (Left)    Anesthesia: Local MAC    Procedure in Detail/Findings:  The patient was brought to the operating room on a stretcher and placed on the operating table in a supine position. Following the successful induction of MAC anesthesia an ankle tourniquet was placed on the left ankle.  When MAC anesthesia was achieved a block was given using 20ml of 1% lidocaine plain and 0.5% Bupivicain plain.  The left foot was prepped and draped in a sterile manner. Thai    Attention was then directed to the left foot lateral wound where a liquefactive necrosis and slogh was noted dorsal to the 5th ray.  This was removed with rongeur.  Infection was found to probe distally into the digit, with further liquefactive necrosis.  Bone was noted to be soft along 5th metatarsal head .  Decision was made to do a 5th ray amptuation due to extent of infection.  10 blade was used to create a flap , excising all necortic skin around toe.  Using sharp and blunt dissection the incision was deepened severing all neuro-vascular structures. The dissection was taken proximally along the proximal phalanx until the MTP joint was visualized.  The entire capsule was then released and all soft tissue was released, and the entire toe at the level of the MTPJ was then removed and prepped to be sent to pathology and swabs were taken for culture and  sensitivity.    Next, a bone cutter was used to to resect the distal portion of the 5th metarsal bone, which was sent with the 5th toe pathology.  Wound was found to probe proximally along hte flexor tendon sheath.  Lopez sicors was used to open and disect along the facial plane.  The wound was then agressivley lavaged with 3L sterile saline via pule lavage.      A clean rongeur was used to resect a small portion of the distal metatarsal stump to be sent for micro as a clean margin to rule out further infection in the bone. No further signs of tracking, abscess or infection were noted.  Following this, the deep subcutaneous tissues were Partially reapproximated using 3-0 nyon suture material.  Tourniquet was let down The incision site was packed with vancomyocin powder, packed with iodosorb packing strips, and dressed with 4x4's, kerlix, ABD, and ace.    The patient was transferred to the recovery room with vital signs stable. Following a period of post op monitoring, the patient will be Discharged home  with the following post op instructions:   1. Keep dressing dry and intact until Podiatry follow up.   2.Weight bearing status: partial weight bearing to heel.  3. All necessary prescriptions ordered and medical management will continue.   4. Contact Podiatry for all post op follow up care and if any problems arise.        Estimated Blood Loss: 15mL           Specimens (From admission, onward)     Start     Ordered    02/13/20 2038  Specimen to Pathology, Surgery Other  Once     Question Answer Comment   Procedure Type: Other    Specimen Class: Routine/Screening        02/13/20 2043              Implants: * No implants in log *           Disposition: PACU - hemodynamically stable.           Condition: Good    Attestation:  I was present and scrubbed for the entire procedure.

## 2020-02-14 NOTE — PLAN OF CARE
Payor: MEDICAID / Plan: MEDICAID OF [place] / Product Type: [employer] /      [facility] [address] [place], [place] - [address]  [address] [facility] [address]  Phone: [phone] Fax: [phone]       02/14/20 1005   Discharge Assessment   Assessment Type Discharge Planning Assessment   Confirmed/corrected address and phone number on facesheet? Yes   Assessment information obtained from? Patient   Expected Length of Stay (days) 5   Communicated expected length of stay with patient/caregiver yes   Prior to hospitilization cognitive status: Alert/Oriented   Prior to hospitalization functional status: Assistive Equipment;Independent  (Quad cane)   Current cognitive status: Alert/Oriented   Current Functional Status: Assistive Equipment;Independent   Lives With alone   Able to Return to Prior Arrangements yes   Is patient able to care for self after discharge? Unable to determine at this time (comments)   Who are your caregiver(s) and their phone number(s)? Nadege Souza-Daughter   [phone]   Patient's perception of discharge disposition skilled nursing facility   Readmission Within the Last 30 Days no previous admission in last 30 days   Patient currently being followed by outpatient case management? No   Patient currently receives any other outside agency services? No   Equipment Currently Used at Home cane, quad;shower chair   Do you have any problems affording any of your prescribed medications? No   Is the patient taking medications as prescribed? yes   Does the patient have transportation home? Yes   Transportation Anticipated family or friend will provide   Dialysis Name and Scheduled days N/A   Does the patient receive services at the [facility]? No   Discharge Plan A Skilled Nursing Facility   Discharge Plan B Rehab   DME Needed Upon Discharge    (TBD)   Patient/Family in Agreement with Plan yes

## 2020-02-14 NOTE — SUBJECTIVE & OBJECTIVE
Interval History:  POD#1 left 5th ray amputation.  Op Note indicates liquefactive necrosis.  Wound probed proximally along the flexor tendon sheath.   No complaints except post-op pain.     Review of Systems   Constitutional: Negative for activity change, appetite change, chills, diaphoresis, fatigue and fever.   HENT: Negative.    Eyes: Negative.    Respiratory: Negative for cough, shortness of breath and wheezing.    Cardiovascular: Positive for leg swelling. Negative for chest pain and palpitations.   Gastrointestinal: Negative for abdominal pain, constipation, diarrhea, nausea and vomiting.   Genitourinary: Negative for difficulty urinating, dysuria and flank pain.   Musculoskeletal: Positive for joint swelling. Negative for arthralgias, back pain and neck pain.   Skin: Positive for color change and wound. Negative for rash.   Neurological: Negative for dizziness, weakness, light-headedness and headaches.   Hematological: Negative for adenopathy.   Psychiatric/Behavioral: Negative for agitation, behavioral problems and confusion.     Objective:     Vital Signs (Most Recent):  Temp: 97.5 °F (36.4 °C) (02/14/20 0748)  Pulse: 84 (02/14/20 0748)  Resp: 18 (02/14/20 0748)  BP: (!) 145/72 (02/14/20 0748)  SpO2: 98 % (02/14/20 0748) Vital Signs (24h Range):  Temp:  [96.4 °F (35.8 °C)-98 °F (36.7 °C)] 97.5 °F (36.4 °C)  Pulse:  [66-84] 84  Resp:  [13-20] 18  SpO2:  [95 %-100 %] 98 %  BP: ()/(50-82) 145/72     Weight: 79.4 kg (175 lb)  Body mass index is 28.25 kg/m².    Estimated Creatinine Clearance: 69.5 mL/min (based on SCr of 0.8 mg/dL).    Physical Exam   Constitutional: She is oriented to person, place, and time. She appears well-developed and well-nourished. No distress.   HENT:   Head: Normocephalic and atraumatic.   Eyes: Conjunctivae are normal. No scleral icterus.   Neck: Normal range of motion.   Cardiovascular: Normal rate and regular rhythm.   No murmur heard.  Pulmonary/Chest: Effort normal and  breath sounds normal. No respiratory distress. She has no wheezes. She has no rales.   Abdominal: Soft.   Musculoskeletal: She exhibits edema. She exhibits no tenderness.   Left foot surgical dressing in place   Neurological: She is alert and oriented to person, place, and time.   Skin: Skin is warm and dry. She is not diaphoretic.   Psychiatric: She has a normal mood and affect. Her behavior is normal.   Vitals reviewed.      Significant Labs:   Blood Culture:   Recent Labs   Lab 02/12/20 1743 02/12/20  1918   LABBLOO No Growth to date  No Growth to date No Growth to date  No Growth to date     CBC:   Recent Labs   Lab 02/12/20 1743 02/13/20 0340 02/14/20  0354   WBC 7.72 6.73 7.97   HGB 11.3* 9.9* 11.1*   HCT 36.3* 31.5* 34.4*    203 232     CMP:   Recent Labs   Lab 02/12/20 1743 02/13/20 0340 02/14/20  0354    139 137   K 4.1 3.9 4.2    106 103   CO2 28 26 26   * 163* 230*   BUN 14 10 8   CREATININE 1.0 0.8 0.8   CALCIUM 10.1 9.2 9.4   PROT 8.0 6.9 7.4   ALBUMIN 3.4* 3.0* 3.2*   BILITOT 0.2 0.3 0.4   ALKPHOS 92 78 79   AST 18 16 16   ALT 13 12 11   ANIONGAP 9 7* 8   EGFRNONAA 57.2* >60.0 >60.0     Wound Culture:   Recent Labs   Lab 02/13/20  1030   LABAERO GRAM NEGATIVE MOHAMUD  Moderate  Identification and susceptibility pending  *       Significant Imaging: I have reviewed all pertinent imaging results/findings within the past 24 hours.

## 2020-02-14 NOTE — ASSESSMENT & PLAN NOTE
69 year old woman with DM (HA1C 7.6) who presented to ED with a foul smelling left foot wound that has been present since she stepped on nail approx 3 months ago.  Did not seek medical care and has been treating herself with local OTC creams.  Developed an eschar which she pulled off and noticed a white purulent material with foul odor.  Subsequently developed erythema and swelling of left leg.  Afebrile, no leukocytosis on arrival to ED.  Imaging concerning for osteomyelitis of left 4th and 5th toes and soft tissue gas.       Now POD#1 left 5th ray amputation.  Op Note indicates liquefactive necrosis.  Wound probed proximally along the flexor tendon sheath. Pre-operative wound cultures show GNR and GPC on gram stain and preliminarily an E Coli in culture.  Surgical cultures and clean margin pending.  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  Currently on IV vancomycin, cefepime, oral flagyl.     Afebrile.  No leukocytosis. Stable and non-septic appearing.        Plan/recommendations:  1.  Continue IV vancomycin empirically given GPC on gram stain and pending final culture results.  Pharmacy to dose.  Maintain trough 15-20  2.  Continue cefepime 2 grams IV q 8 hours and metronidazole 500 mg orally q 8 hours for now  3.  Anticipate long term antibiotics - osteo also noted on 4th toe.   4.  Will follow cultures and de-escalate tomorrow.   5.  Recommend Vascular Surgery evaluation of blood flow for wound healing.  Discussed with Podiatry.   6.  Please ensure Tdap is administered.   7.  Will follow     Data reviewed and plan discussed with ID staff

## 2020-02-14 NOTE — PLAN OF CARE
02/14/20 1441   Post-Acute Status   Post-Acute Authorization Placement   Post-Acute Placement Status Awaiting Therapy Documentation   Sw following patient for discharge needs. Awaiting PT/OT recs at this time. SW in communication with CM and patient care team.  Keysha Garcia, TRACE  [facility]

## 2020-02-14 NOTE — PROGRESS NOTES
[facility]  Infectious Disease  Progress Note    Patient Name: Ana Maria Souza  MRN: [id number]  Admission Date: 2/12/2020  Length of Stay: 2 days  Attending Physician: Bee Jasso MD  Primary Care Provider: Primary Doctor No    Isolation Status: No active isolations  Assessment/Plan:      Acute osteomyelitis of toe of left foot     69 year old woman with DM (HA1C 7.6) who presented to ED with a foul smelling left foot wound that has been present since she stepped on nail approx 3 months ago.  Did not seek medical care and has been treating herself with local OTC creams.  Developed an eschar which she pulled off and noticed a white purulent material with foul odor.  Subsequently developed erythema and swelling of left leg.  Afebrile, no leukocytosis on arrival to ED.  Imaging concerning for osteomyelitis of left 4th and 5th toes and soft tissue gas.       Now POD#1 left 5th ray amputation.  Op Note indicates liquefactive necrosis.  Wound probed proximally along the flexor tendon sheath. Pre-operative wound cultures show GNR and GPC on gram stain and preliminarily an E Coli in culture.  Surgical cultures and clean margin pending.  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  Currently on IV vancomycin, cefepime, oral flagyl.     Afebrile.  No leukocytosis. Stable and non-septic appearing.        Plan/recommendations:  1.  Continue IV vancomycin empirically given GPC on gram stain and pending final culture results.  Pharmacy to dose.  Maintain trough 15-20  2.  Continue cefepime 2 grams IV q 8 hours and metronidazole 500 mg orally q 8 hours for now  3.  Anticipate long term antibiotics - osteo also noted on 4th toe.   4.  Will follow cultures and de-escalate tomorrow.   5.  Recommend Vascular Surgery evaluation of blood flow for wound healing.  Discussed with Podiatry.   6.  Please ensure Tdap is administered.   7.   Will follow     Data reviewed and plan discussed with ID staff        Thank you.   Please call for any questions or concerns.  PABLITO Kauffman, ANP-C  [phone] pager, [place] [phone]    Subjective:     Principal Problem:Diabetic foot infection    HPI: Ms. Ana Maria Souza is a 69 year old woman with DM who presented to ED with a foul smelling left foot wound.  Per initial HPI, she stepped on a nail approximately 3 months prior to admission but did not seek medical care.  She was using OTC creams (silver sulfadiazine and ammonia acetate) but wound was not healing.  Developed an eschar which she pulled off and noticed a white purulent material with foul odor.  Subsequently developed erythema and swelling of left leg.  Did not go to the ED as she was waiting for Medicaid to be finalized.  Afebrile, no leukocytosis on arrival to ED.      Imaging:    X-ray left foot - showed osseous erosion of the distal 5th metatarsal head and proximal and middle phalanx of the 5th digit and proximal phalanx of the 4th digit.    MRI also concerning for osteomyelitis of left 4th and 5th toes.  Soft tissue gas noted.     Podiatry has evaluated and plan is for surgical I&D and possible 5th toe amputation today.     Interval History:  POD#1 left 5th ray amputation.  Op Note indicates liquefactive necrosis.  Wound probed proximally along the flexor tendon sheath.   No complaints except post-op pain.     Review of Systems   Constitutional: Negative for activity change, appetite change, chills, diaphoresis, fatigue and fever.   HENT: Negative.    Eyes: Negative.    Respiratory: Negative for cough, shortness of breath and wheezing.    Cardiovascular: Positive for leg swelling. Negative for chest pain and palpitations.   Gastrointestinal: Negative for abdominal pain, constipation, diarrhea, nausea and vomiting.   Genitourinary: Negative for difficulty urinating, dysuria and flank pain.   Musculoskeletal: Positive for joint swelling. Negative for  arthralgias, back pain and neck pain.   Skin: Positive for color change and wound. Negative for rash.   Neurological: Negative for dizziness, weakness, light-headedness and headaches.   Hematological: Negative for adenopathy.   Psychiatric/Behavioral: Negative for agitation, behavioral problems and confusion.     Objective:     Vital Signs (Most Recent):  Temp: 97.5 °F (36.4 °C) (02/14/20 0748)  Pulse: 84 (02/14/20 0748)  Resp: 18 (02/14/20 0748)  BP: (!) 145/72 (02/14/20 0748)  SpO2: 98 % (02/14/20 0748) Vital Signs (24h Range):  Temp:  [96.4 °F (35.8 °C)-98 °F (36.7 °C)] 97.5 °F (36.4 °C)  Pulse:  [66-84] 84  Resp:  [13-20] 18  SpO2:  [95 %-100 %] 98 %  BP: ()/(50-82) 145/72     Weight: 79.4 kg (175 lb)  Body mass index is 28.25 kg/m².    Estimated Creatinine Clearance: 69.5 mL/min (based on SCr of 0.8 mg/dL).    Physical Exam   Constitutional: She is oriented to person, place, and time. She appears well-developed and well-nourished. No distress.   HENT:   Head: Normocephalic and atraumatic.   Eyes: Conjunctivae are normal. No scleral icterus.   Neck: Normal range of motion.   Cardiovascular: Normal rate and regular rhythm.   No murmur heard.  Pulmonary/Chest: Effort normal and breath sounds normal. No respiratory distress. She has no wheezes. She has no rales.   Abdominal: Soft.   Musculoskeletal: She exhibits edema. She exhibits no tenderness.   Left foot surgical dressing in place   Neurological: She is alert and oriented to person, place, and time.   Skin: Skin is warm and dry. She is not diaphoretic.   Psychiatric: She has a normal mood and affect. Her behavior is normal.   Vitals reviewed.      Significant Labs:   Blood Culture:   Recent Labs   Lab 02/12/20 1743 02/12/20 1918   LABBLOO No Growth to date  No Growth to date No Growth to date  No Growth to date     CBC:   Recent Labs   Lab 02/12/20 1743 02/13/20  0340 02/14/20  0354   WBC 7.72 6.73 7.97   HGB 11.3* 9.9* 11.1*   HCT 36.3* 31.5* 34.4*     203 232     CMP:   Recent Labs   Lab 02/12/20  1743 02/13/20  0340 02/14/20  0354    139 137   K 4.1 3.9 4.2    106 103   CO2 28 26 26   * 163* 230*   BUN 14 10 8   CREATININE 1.0 0.8 0.8   CALCIUM 10.1 9.2 9.4   PROT 8.0 6.9 7.4   ALBUMIN 3.4* 3.0* 3.2*   BILITOT 0.2 0.3 0.4   ALKPHOS 92 78 79   AST 18 16 16   ALT 13 12 11   ANIONGAP 9 7* 8   EGFRNONAA 57.2* >60.0 >60.0     Wound Culture:   Recent Labs   Lab 02/13/20  1030   LABAERO GRAM NEGATIVE MOHAMUD  Moderate  Identification and susceptibility pending  *       Significant Imaging: I have reviewed all pertinent imaging results/findings within the past 24 hours.

## 2020-02-14 NOTE — PLAN OF CARE
VSS. No c/o pain at this time. No c/o n/v. Dressing intact with small amount sanguinous drainage.

## 2020-02-14 NOTE — PROGRESS NOTES
[facility] Hospital Medicine  Progress Note    Patient Name: Ana Maria Souza  MRN: [id number]  Patient Class: IP- Inpatient   Admission Date: 2/12/2020  Length of Stay: 2 days  Attending Physician: Bee Jasso MD  Primary Care Provider: Primary Doctor [facility] Medicine Team: [facility] K Bee Jasso MD    Subjective:     Principal Problem:Diabetic foot infection    Interval History: POD 1 from podiatry I/D 2/13    Review of Systems   Constitutional: Positive for chills and fatigue. Negative for fever.   HENT: Negative for congestion and trouble swallowing.    Eyes: Negative for discharge and itching.   Respiratory: Negative for apnea, cough, chest tightness and shortness of breath.    Cardiovascular: Negative for chest pain and leg swelling.   Gastrointestinal: Negative for abdominal distention and abdominal pain.   Endocrine: Negative for cold intolerance, polydipsia and polyphagia.   Genitourinary: Negative for difficulty urinating, dysuria and flank pain.   Musculoskeletal: Negative for arthralgias and back pain.   Neurological: Negative for dizziness, syncope, facial asymmetry and light-headedness.   Hematological: Does not bruise/bleed easily.   Psychiatric/Behavioral: Negative for decreased concentration and hallucinations.     Objective:     Vital Signs (Most Recent):  Temp: 98.8 °F (37.1 °C) (02/14/20 1107)  Pulse: 75 (02/14/20 1107)  Resp: 18 (02/14/20 1107)  BP: (!) 141/50 (02/14/20 1107)  SpO2: 95 % (02/14/20 1107) Vital Signs (24h Range):  Temp:  [97.5 °F (36.4 °C)-98.8 °F (37.1 °C)] 98.8 °F (37.1 °C)  Pulse:  [69-84] 75  Resp:  [13-18] 18  SpO2:  [95 %-100 %] 95 %  BP: ()/(50-82) 141/50     Weight: 79.4 kg (175 lb)  Body mass index is 28.25 kg/m².    Intake/Output Summary (Last 24 hours) at 2/14/2020 1600  Last data filed at 2/14/2020 0600  Gross per 24 hour   Intake 1120 ml   Output --   Net 1120 ml      Constitutional: Appears well developed and  well nourished.  Foul smelling odor extending into the hallway  Head: Normocephalic and atraumatic.   Mouth/Throat: Oropharynx is clear and moist.   Eyes: EOM are normal. Pupils are equal, round, and reactive to light. No scleral icterus.   Neck: Normal range of motion. Neck supple.   Cardiovascular: Normal heart rate.  Regular heart rhythm.  No murmur heard.  Pulmonary/Chest: Effort normal. No respiratory distress. No wheezes, rales, or rhonchi  Abdominal: Soft. Bowel sounds are normal.  No distension.  No tenderness  Musculoskeletal: Normal range of motion. Left foot wrapped with foul smelling odor  Neurological: Alert and oriented to person, place, and time.   Skin: Skin is warm and dry.   Psychiatric: Normal mood and affect. Behavior is normal.          Overview/Hospital Course:   History of Present Illness:     Ms. Ana Maria Souza is a 69 y.o. female with T2DM not on medication, who presents to the ED for evaluation of a foul smelling left foot wound.  She mentons that about 3 months prior to admission, she stepped on a nail.  She didn't seek medical care at that time, but she developed a small found on the dorsum of her left foot.  She started to use OTC creams about 2 weeks ago when she saw that the wound was not healing and seemed to be opening more.  She was using Silver Sulfadiazine and Ammonia Acetate creams, and the wound began to harden with a thick eschar over the next few days.  She then pulled off the eschar, and noticed white purulent material was produced along with a foul smelling odor.  Over time, she has noticed some swelling and erythema extending to her left leg.  She decided to wait and go to the ER once her Medicaid was finalized.  She denies any fevers or chills.  She denies any difficulty with ambulation.  She has been taking Tylenol and Tylenol Extra Strength for pain.     Past Medical History: Patient has no past medical history on file.        Past Surgical History: Patient has a  past surgical history that includes Tympanoplasty.        Social History: Patient reports that she has never smoked. She has never used smokeless tobacco. She reports that she does not use drugs.        Family History: Patient's family history is not on file.     Significant Labs:   A1C:   Recent Labs   Lab 02/12/20  1928   HGBA1C 7.6*     CBC:   Recent Labs   Lab 02/12/20  1743 02/13/20  0340 02/14/20  0354   WBC 7.72 6.73 7.97   HGB 11.3* 9.9* 11.1*   HCT 36.3* 31.5* 34.4*    203 232       Significant Imaging: I have reviewed all pertinent imaging results/findings within the past 24 hours.  MRI with gas and abscess of foot. Left side    Assessment/Plan:      Active Diagnoses:    Diagnosis Date Noted POA    PRINCIPAL PROBLEM:  Diabetic foot infection [E11.628, L08.9] 02/12/2020 Yes    Acute osteomyelitis of toe of left foot [M86.172] 02/13/2020 Unknown    Chronic pain of left ankle [M25.572, G89.29] 02/13/2020 Unknown    Type 2 diabetes mellitus, without long-term current use of insulin [E11.9] 02/12/2020 Yes    Essential hypertension [I10] 02/12/2020 Yes             Diabetic Foot Wound  · Afebrile and without leukocytosis.  ESR 88, CRP 7  · HbA1c 7.6  · MRI with left forefoot with associated heterogeneous region of central non-enhancement concerning for abscess or necrotic tissue with dimensions as above.  There are scattered foci of susceptibility artifact throughout this region concerning for soft tissue gas. Abnormal marrow edema and enhancement involving the 5th metatarsal and phalanges as well as the proximal and middle phalanges of the 4th toe concerning for associated osteomyelitis.  · Podiatry consulted, appreciate assistance. I/D on 2/13.   · ID consulted, appreciate assistance.  Covering GPC from stain.      Essential Hypertension  · Chronic issue but not on medication at home  · Start Lisinopril 10mg.  Titrate up as needed     Type 2 DM without Long Term Insulin Use  · HbA1c 7.6  · Not on  medication at home  · SSI with POCT accuchecks and Diabetic diet        Diet:  NPO   VTE PPx:  Ambulatory  Goals of Care:  Full       Disposition:  Pending Podiatry and ID recs  Discharge Needs:  TBD    Bee Jasso MD  Department of Hospital Medicine   [facility]

## 2020-02-14 NOTE — NURSING TRANSFER
Nursing Transfer Note      2/13/2020     Transfer To: [address]    Transfer via stretcher    Transfer with cardiac monitoring    Transported by PCT    Medicines sent: n/a    Chart send with patient: Yes    Notified: family

## 2020-02-14 NOTE — PLAN OF CARE
Plan of care reviewed and discussed with patient. Pt verbalizes understanding. Purposeful rounding done. All safety precautions in place. No falls this shift.

## 2020-02-15 LAB
ALBUMIN SERPL BCP-MCNC: 2.7 G/DL (ref 3.5–5.2)
ALP SERPL-CCNC: 70 U/L (ref 55–135)
ALT SERPL W/O P-5'-P-CCNC: 8 U/L (ref 10–44)
ANION GAP SERPL CALC-SCNC: 5 MMOL/L (ref 8–16)
AST SERPL-CCNC: 11 U/L (ref 10–40)
BACTERIA SPEC AEROBE CULT: ABNORMAL
BASOPHILS # BLD AUTO: 0.05 K/UL (ref 0–0.2)
BASOPHILS NFR BLD: 0.5 % (ref 0–1.9)
BILIRUB SERPL-MCNC: 0.3 MG/DL (ref 0.1–1)
BUN SERPL-MCNC: 12 MG/DL (ref 8–23)
CALCIUM SERPL-MCNC: 8.9 MG/DL (ref 8.7–10.5)
CHLORIDE SERPL-SCNC: 105 MMOL/L (ref 95–110)
CO2 SERPL-SCNC: 30 MMOL/L (ref 23–29)
CREAT SERPL-MCNC: 0.8 MG/DL (ref 0.5–1.4)
DIFFERENTIAL METHOD: ABNORMAL
EOSINOPHIL # BLD AUTO: 0.2 K/UL (ref 0–0.5)
EOSINOPHIL NFR BLD: 2.2 % (ref 0–8)
ERYTHROCYTE [DISTWIDTH] IN BLOOD BY AUTOMATED COUNT: 14 % (ref 11.5–14.5)
EST. GFR  (AFRICAN AMERICAN): >60 ML/MIN/1.73 M^2
EST. GFR  (NON AFRICAN AMERICAN): >60 ML/MIN/1.73 M^2
GLUCOSE SERPL-MCNC: 170 MG/DL (ref 70–110)
HCT VFR BLD AUTO: 31.5 % (ref 37–48.5)
HGB BLD-MCNC: 9.9 G/DL (ref 12–16)
IMM GRANULOCYTES # BLD AUTO: 0.02 K/UL (ref 0–0.04)
IMM GRANULOCYTES NFR BLD AUTO: 0.2 % (ref 0–0.5)
LYMPHOCYTES # BLD AUTO: 3.2 K/UL (ref 1–4.8)
LYMPHOCYTES NFR BLD: 34.6 % (ref 18–48)
MAGNESIUM SERPL-MCNC: 1.8 MG/DL (ref 1.6–2.6)
MCH RBC QN AUTO: 29.9 PG (ref 27–31)
MCHC RBC AUTO-ENTMCNC: 31.4 G/DL (ref 32–36)
MCV RBC AUTO: 95 FL (ref 82–98)
MONOCYTES # BLD AUTO: 1 K/UL (ref 0.3–1)
MONOCYTES NFR BLD: 10.6 % (ref 4–15)
NEUTROPHILS # BLD AUTO: 4.7 K/UL (ref 1.8–7.7)
NEUTROPHILS NFR BLD: 51.9 % (ref 38–73)
NRBC BLD-RTO: 0 /100 WBC
PHOSPHATE SERPL-MCNC: 3.4 MG/DL (ref 2.7–4.5)
PLATELET # BLD AUTO: 218 K/UL (ref 150–350)
PMV BLD AUTO: 10.9 FL (ref 9.2–12.9)
POCT GLUCOSE: 122 MG/DL (ref 70–110)
POCT GLUCOSE: 124 MG/DL (ref 70–110)
POCT GLUCOSE: 144 MG/DL (ref 70–110)
POCT GLUCOSE: 200 MG/DL (ref 70–110)
POTASSIUM SERPL-SCNC: 3.7 MMOL/L (ref 3.5–5.1)
PROT SERPL-MCNC: 6.6 G/DL (ref 6–8.4)
RBC # BLD AUTO: 3.31 M/UL (ref 4–5.4)
SODIUM SERPL-SCNC: 140 MMOL/L (ref 136–145)
WBC # BLD AUTO: 9.15 K/UL (ref 3.9–12.7)

## 2020-02-15 PROCEDURE — 36415 COLL VENOUS BLD VENIPUNCTURE: CPT

## 2020-02-15 PROCEDURE — 99233 PR SUBSEQUENT HOSPITAL CARE,LEVL III: ICD-10-PCS | Mod: ,,, | Performed by: NURSE PRACTITIONER

## 2020-02-15 PROCEDURE — 99222 1ST HOSP IP/OBS MODERATE 55: CPT | Mod: 57,,, | Performed by: SURGERY

## 2020-02-15 PROCEDURE — 63600175 PHARM REV CODE 636 W HCPCS: Performed by: INTERNAL MEDICINE

## 2020-02-15 PROCEDURE — C1751 CATH, INF, PER/CENT/MIDLINE: HCPCS

## 2020-02-15 PROCEDURE — A4216 STERILE WATER/SALINE, 10 ML: HCPCS | Performed by: HOSPITALIST

## 2020-02-15 PROCEDURE — 83735 ASSAY OF MAGNESIUM: CPT

## 2020-02-15 PROCEDURE — 99233 SBSQ HOSP IP/OBS HIGH 50: CPT | Mod: ,,, | Performed by: NURSE PRACTITIONER

## 2020-02-15 PROCEDURE — 97165 OT EVAL LOW COMPLEX 30 MIN: CPT

## 2020-02-15 PROCEDURE — 86580 TB INTRADERMAL TEST: CPT | Performed by: HOSPITALIST

## 2020-02-15 PROCEDURE — 84100 ASSAY OF PHOSPHORUS: CPT

## 2020-02-15 PROCEDURE — 36573 INSJ PICC RS&I 5 YR+: CPT

## 2020-02-15 PROCEDURE — 97161 PT EVAL LOW COMPLEX 20 MIN: CPT

## 2020-02-15 PROCEDURE — 76937 US GUIDE VASCULAR ACCESS: CPT

## 2020-02-15 PROCEDURE — 25000003 PHARM REV CODE 250: Performed by: HOSPITALIST

## 2020-02-15 PROCEDURE — 25000003 PHARM REV CODE 250: Performed by: INTERNAL MEDICINE

## 2020-02-15 PROCEDURE — 99222 PR INITIAL HOSPITAL CARE,LEVL II: ICD-10-PCS | Mod: 57,,, | Performed by: SURGERY

## 2020-02-15 PROCEDURE — 63600175 PHARM REV CODE 636 W HCPCS: Performed by: HOSPITALIST

## 2020-02-15 PROCEDURE — 30200315 PPD INTRADERMAL TEST REV CODE 302: Performed by: HOSPITALIST

## 2020-02-15 PROCEDURE — 85025 COMPLETE CBC W/AUTO DIFF WBC: CPT

## 2020-02-15 PROCEDURE — 97530 THERAPEUTIC ACTIVITIES: CPT

## 2020-02-15 PROCEDURE — 97535 SELF CARE MNGMENT TRAINING: CPT

## 2020-02-15 PROCEDURE — 99232 PR SUBSEQUENT HOSPITAL CARE,LEVL II: ICD-10-PCS | Mod: ,,, | Performed by: HOSPITALIST

## 2020-02-15 PROCEDURE — 11000001 HC ACUTE MED/SURG PRIVATE ROOM

## 2020-02-15 PROCEDURE — 80053 COMPREHEN METABOLIC PANEL: CPT

## 2020-02-15 PROCEDURE — 99232 SBSQ HOSP IP/OBS MODERATE 35: CPT | Mod: ,,, | Performed by: HOSPITALIST

## 2020-02-15 RX ORDER — SODIUM CHLORIDE 0.9 % (FLUSH) 0.9 %
10 SYRINGE (ML) INJECTION
Status: DISCONTINUED | OUTPATIENT
Start: 2020-02-15 | End: 2020-02-24 | Stop reason: HOSPADM

## 2020-02-15 RX ORDER — SODIUM CHLORIDE 0.9 % (FLUSH) 0.9 %
10 SYRINGE (ML) INJECTION EVERY 6 HOURS
Status: DISCONTINUED | OUTPATIENT
Start: 2020-02-15 | End: 2020-02-24 | Stop reason: HOSPADM

## 2020-02-15 RX ORDER — ENOXAPARIN SODIUM 100 MG/ML
40 INJECTION SUBCUTANEOUS EVERY 24 HOURS
Status: DISCONTINUED | OUTPATIENT
Start: 2020-02-15 | End: 2020-02-24 | Stop reason: HOSPADM

## 2020-02-15 RX ORDER — BISACODYL 10 MG
10 SUPPOSITORY, RECTAL RECTAL DAILY PRN
Status: DISCONTINUED | OUTPATIENT
Start: 2020-02-15 | End: 2020-02-24 | Stop reason: HOSPADM

## 2020-02-15 RX ORDER — INSULIN ASPART 100 [IU]/ML
0-5 INJECTION, SOLUTION INTRAVENOUS; SUBCUTANEOUS
Status: DISCONTINUED | OUTPATIENT
Start: 2020-02-15 | End: 2020-02-24 | Stop reason: HOSPADM

## 2020-02-15 RX ADMIN — METRONIDAZOLE 500 MG: 500 TABLET ORAL at 09:02

## 2020-02-15 RX ADMIN — CEFEPIME 2 G: 2 INJECTION, POWDER, FOR SOLUTION INTRAVENOUS at 03:02

## 2020-02-15 RX ADMIN — ACETAMINOPHEN 650 MG: 325 TABLET ORAL at 07:02

## 2020-02-15 RX ADMIN — CEFEPIME 2 G: 2 INJECTION, POWDER, FOR SOLUTION INTRAVENOUS at 09:02

## 2020-02-15 RX ADMIN — HYDROCODONE BITARTRATE AND ACETAMINOPHEN 1 TABLET: 5; 325 TABLET ORAL at 06:02

## 2020-02-15 RX ADMIN — METRONIDAZOLE 500 MG: 500 TABLET ORAL at 05:02

## 2020-02-15 RX ADMIN — ACETAMINOPHEN 650 MG: 325 TABLET ORAL at 09:02

## 2020-02-15 RX ADMIN — Medication 5 UNITS: at 05:02

## 2020-02-15 RX ADMIN — LISINOPRIL 10 MG: 10 TABLET ORAL at 09:02

## 2020-02-15 RX ADMIN — Medication 10 ML: at 06:02

## 2020-02-15 RX ADMIN — VANCOMYCIN HYDROCHLORIDE 1250 MG: 1.25 INJECTION, POWDER, LYOPHILIZED, FOR SOLUTION INTRAVENOUS at 09:02

## 2020-02-15 RX ADMIN — ENOXAPARIN SODIUM 40 MG: 100 INJECTION SUBCUTANEOUS at 05:02

## 2020-02-15 RX ADMIN — CEFEPIME 2 G: 2 INJECTION, POWDER, FOR SOLUTION INTRAVENOUS at 05:02

## 2020-02-15 RX ADMIN — DOCUSATE SODIUM - SENNOSIDES 1 TABLET: 50; 8.6 TABLET, FILM COATED ORAL at 09:02

## 2020-02-15 RX ADMIN — VANCOMYCIN HYDROCHLORIDE 1250 MG: 1.25 INJECTION, POWDER, LYOPHILIZED, FOR SOLUTION INTRAVENOUS at 10:02

## 2020-02-15 NOTE — PROCEDURES
"Ana Maria Souza is a 69 y.o. female patient.    Temp: 97.7 °F (36.5 °C) (02/15/20 1120)  Pulse: 74 (02/15/20 1121)  Resp: 18 (02/15/20 1120)  BP: (!) 140/67 (02/15/20 1120)  SpO2: 98 % (02/15/20 1120)  Weight: 79.4 kg (175 lb) (02/13/20 0804)  Height: 5' 6" (167.6 cm) (02/12/20 1542)    PICC  Date/Time: 2/15/2020 3:02 PM  Performed by: Gabbie Anderson RN  Assisting provider: Leola Cavazos LPN  Consent Done: Yes  Time out: Immediately prior to procedure a time out was called to verify the correct patient, procedure, equipment, support staff and site/side marked as required  Indications: med administration and vascular access  Anesthesia: local infiltration  Local anesthetic: lidocaine 1% without epinephrine  Anesthetic Total (mL): 3  Preparation: skin prepped with ChloraPrep  Skin prep agent dried: skin prep agent completely dried prior to procedure  Sterile barriers: all five maximum sterile barriers used - cap, mask, sterile gown, sterile gloves, and large sterile sheet  Hand hygiene: hand hygiene performed prior to central venous catheter insertion  Location details: right brachial  Catheter type: double lumen  Catheter size: 5 Fr  Catheter Length: 40cm    Ultrasound guidance: yes  Vessel Caliber: medium and patent, compressibility normal  Vascular Doppler: not done  Needle advanced into vessel with real time Ultrasound guidance.  Guidewire confirmed in vessel.  Image recorded and saved.  Sterile sheath used.  Number of attempts: 1  Post-procedure: blood return through all ports, chlorhexidine patch and sterile dressing applied  Technical procedures used: 3cg  Specimens: No  Implants: No  Assessment: placement verified by x-ray  Complications: none          Leola Cavazos  2/15/2020  "

## 2020-02-15 NOTE — PT/OT/SLP EVAL
"Physical Therapy Evaluation    Patient Name:  Ana Maria Souza   MRN:  [id number]    Recommendations:     Discharge Recommendations:  nursing facility, skilled   Discharge Equipment Recommendations: (TBD)   Barriers to discharge: Inaccessible home and Decreased caregiver support    Assessment:     Ana Maria Souza is a 69 y.o. female admitted with a medical diagnosis of Diabetic foot infection.  She presents with the following impairments/functional limitations:  weakness, impaired endurance, impaired self care skills, impaired functional mobilty, gait instability, impaired balance, decreased lower extremity function, decreased ROM, impaired skin, pain, orthopedic precautions.  Pt is s/p I&D of L foot, as of 2/13/20.  Current precautions: L LE heel WBing only, allowed to amb for short transfers only.     Upon evaluation, pt requires S-CGA to perform stand pivot transfers with use of quad cane for support.  At this time, pt is not allowed to amb longer distances than stand pivot transfers, unable to negotiate stairs, and does not have assistance in the home.  Therefore unable to return to the home at this time.      Rehab Prognosis: Good; patient would benefit from acute skilled PT services to address these deficits and reach maximum level of function.    Recent Surgery: Procedure(s) (LRB):  INCISION AND DRAINAGE, FOOT (Left) 2 Days Post-Op    Plan:     During this hospitalization, patient to be seen daily to address the identified rehab impairments via gait training, therapeutic activities, therapeutic exercises, neuromuscular re-education and progress toward the following goals:    · Plan of Care Expires:  03/14/20    Subjective     Chief Complaint: L ankle pain  Patient/Family Comments/goals: "I was doing it before I came in here." - re: WBing status  Pain/Comfort:  · Pain Rating 1: 5/10  · Location - Side 1: Left  · Location 1: ankle  · Pain Addressed 1: Pre-medicate for activity, Cessation of " Activity    Patients cultural, spiritual, Mormon conflicts given the current situation: no    Living Environment:  Pt lives alone, SSH, 2 CAROL.  Prior to admission, patients level of function required use of quad cane to amb, I with ADLs.  Equipment used at home: cane, quad, bath bench, bedside commode.  DME owned (not currently used): none.  Upon discharge, patient assistance is unknown.    Objective:     Communicated with nursing prior to session.  Patient found supine with peripheral IV, PICC line, wound vac, telemetry  upon PT entry to room.    General Precautions: Standard, fall, diabetic   Orthopedic Precautions:(L LE heel WBing only, allowed to amb short transfers only)   Braces: N/A     Exams:  · Cognitive Exam:  Patient is oriented to Person, Place, Time and Situation  · Fine Motor Coordination:    · -       Intact  Left hand thumb/finger opposition skills and Right hand thumb/finger opposition skills  · Gross Motor Coordination:  WFL  · Postural Exam:  Patient presented with the following abnormalities:    · -       No postural abnormalities identified  · Sensation:    · -       Impaired  light/touch L foot  · Skin Integrity/Edema:      · -       Edema: Mild L foot  · RUE ROM: WFL  · RUE Strength: WFL  · LUE ROM: WFL  · LUE Strength: WFL  · RLE ROM: WFL  · RLE Strength: WFL  · LLE ROM: WFL except ankle ROM not tested  · LLE Strength: WFL except DF not tested    Functional Mobility:  · Bed Mobility:     · Rolling Left:  independence  · Rolling Right: independence  · Scooting: supervision  · Supine to Sit: independence  · Sit to Supine: independence    · Transfers:     · Sit to Stand:  supervision with ed on hand placement  · Bed to BSC: supervision with  quad cane  using  Stand Pivot  · BSC to Bed: contact guard assistance with  quad cane  using  Stand Pivot    · Gait: Pt amb a few steps during stand pivot transfers, CGA, with quad cane, decreased WBing on L    · Balance: CGA: dynamic standing balance  with AD, I: static sitting balance EOB      Therapeutic Activities and Exercises:   Whiteboard updated    AM-PAC 6 CLICK MOBILITY  Total Score:17     Patient left supine with all lines intact and call button in reach.    GOALS:   Multidisciplinary Problems     Physical Therapy Goals        Problem: Physical Therapy Goal    Goal Priority Disciplines Outcome Goal Variances Interventions   Physical Therapy Goal     PT, PT/OT Ongoing, Progressing     Description:  Goals to be met by: 20     Patient will increase functional independence with mobility by performin. Sit to stand transfer with Modified [place].  2. Bed to chair transfer with Modified [place] using Quad Cane.  3. Gait  x 100 feet with CGA using Quad Cane.   4. Ascend/descend 2 stair with bilateral Handrails Contact Guard Assistance using Quad Cane.   5. Lower extremity exercise program x 20 reps per handout, with assistance as needed.                      History:     History reviewed. No pertinent past medical history.    Past Surgical History:   Procedure Laterality Date    INCISION AND DRAINAGE FOOT Left 2020    Procedure: INCISION AND DRAINAGE, FOOT;  Surgeon: Mau Peterson DPM;  Location: [facility] OR [address] [place] [address];  Service: Podiatry;  Laterality: Left;    TYMPANOPLASTY         Time Tracking:     PT Received On: 02/15/20  PT Start Time: 1114     PT Stop Time: 1146  PT Total Time (min): 32 min     Billable Minutes: Evaluation 7 and Therapeutic Activity 24      Alma Delia Croft, PT  02/15/2020

## 2020-02-15 NOTE — PLAN OF CARE
Plan of care reviewed and discussed with patient. Pt verbalizes understanding. Pt AA&O x 4, VSS, and no s/s of distress. Purposeful rounding done. All safety precautions in place. No falls this shift. Wound vac in place to left foot with setting of 125.

## 2020-02-15 NOTE — ASSESSMENT & PLAN NOTE
Ankle x-ray not showing fracture, but does show arthritis, discussed that once wound is healed will consider ankle brace and other conservative measures for ankle pain.

## 2020-02-15 NOTE — PLAN OF CARE
Problem: Occupational Therapy Goal  Goal: Occupational Therapy Goal  Outcome: Met   OT eval completed; no goals established as pt is performing ADL safely and without A. TESFAYE Porter  2/15/2020

## 2020-02-15 NOTE — PLAN OF CARE
Problem: Physical Therapy Goal  Goal: Physical Therapy Goal  Description  Goals to be met by: 20     Patient will increase functional independence with mobility by performin. Sit to stand transfer with Modified [place].  2. Bed to chair transfer with Modified [place] using Quad Cane.  3. Gait  x 100 feet with CGA using Quad Cane.   4. Ascend/descend 2 stair with bilateral Handrails Contact Guard Assistance using Quad Cane.   5. Lower extremity exercise program x 20 reps per handout, with assistance as needed.     Outcome: Ongoing, Progressing    PT goals established.

## 2020-02-15 NOTE — ASSESSMENT & PLAN NOTE
Pt is 70 yo F with abnormal arterial US likely 2/2 chronic PAD    Will plan for LLE angiogram 2/17  Recommend ASA and statin  Will obtain consent tomorrow  Please make NPO midnight before procedure    Please call with any questions or concerns

## 2020-02-15 NOTE — SUBJECTIVE & OBJECTIVE
Medications Prior to Admission   Medication Sig Dispense Refill Last Dose    aspirin 325 MG tablet Take 325 mg by mouth once daily.   2/12/2020       Review of patient's allergies indicates:  No Known Allergies    History reviewed. No pertinent past medical history.  Past Surgical History:   Procedure Laterality Date    INCISION AND DRAINAGE FOOT Left 2/13/2020    Procedure: INCISION AND DRAINAGE, FOOT;  Surgeon: Mau Peterson DPM;  Location: [facility] OR [address] [place] [address];  Service: Podiatry;  Laterality: Left;    TYMPANOPLASTY       Family History     None        Tobacco Use    Smoking status: Never Smoker    Smokeless tobacco: Never Used   Substance and Sexual Activity    Alcohol use: Not on file     Comment: 3 glasses of wine/ week    Drug use: Never    Sexual activity: Not on file     Review of Systems   All other systems reviewed and are negative.    Objective:     Vital Signs (Most Recent):  Temp: 97.7 °F (36.5 °C) (02/15/20 1120)  Pulse: 74 (02/15/20 1121)  Resp: 18 (02/15/20 1120)  BP: (!) 140/67 (02/15/20 1120)  SpO2: 98 % (02/15/20 1120) Vital Signs (24h Range):  Temp:  [96.1 °F (35.6 °C)-97.7 °F (36.5 °C)] 97.7 °F (36.5 °C)  Pulse:  [60-87] 74  Resp:  [16-20] 18  SpO2:  [93 %-98 %] 98 %  BP: (137-172)/(62-75) 140/67     Weight: 79.4 kg (175 lb)  Body mass index is 28.25 kg/m².    Physical Exam   Constitutional: She is oriented to person, place, and time. She appears well-developed and well-nourished.   Cardiovascular: Normal rate and regular rhythm.   Pulmonary/Chest: Effort normal. No respiratory distress.   Abdominal: Soft. She exhibits no distension.   Musculoskeletal: Normal range of motion.   LLE with biphasic pedal signals  RLE with biphasic PT, no DP  Left foot with wound vac in place, ankle is same warmth as right, appropriately tender  No tenderness in calf  Motor and sensation intact  Also has small ulcer at base of left 1st tos   Neurological: She is alert and oriented to person, place, and time.    Skin: Skin is warm and dry.   Psychiatric: She has a normal mood and affect. Her behavior is normal. Judgment and thought content normal.   Nursing note and vitals reviewed.      Significant Labs:  CBC:   Recent Labs   Lab 02/15/20  0447   WBC 9.15   RBC 3.31*   HGB 9.9*   HCT 31.5*      MCV 95   MCH 29.9   MCHC 31.4*     CMP:   Recent Labs   Lab 02/15/20  0447   *   CALCIUM 8.9   ALBUMIN 2.7*   PROT 6.6      K 3.7   CO2 30*      BUN 12   CREATININE 0.8   ALKPHOS 70   ALT 8*   AST 11   BILITOT 0.3       Significant Diagnostics:  I have reviewed all pertinent imaging results/findings within the past 24 hours.   The peak systolic velocities on the right are as follows, in centimeters/second:    Common femoral artery: 101 and triphasic    Deep femoral artery: 57 and biphasic    Superficial femoral artery, proximal: 113 and triphasic    Superficial femoral artery, mid portion: 91 and triphasic    Superficial femoral artery, distal: 81 and triphasic    Popliteal artery: 58 and triphasic    Posterior tibial artery: 34 and monophasic    Anterior tibial artery: 232 and monophasic    The peak systolic velocities on the left are as follows, in centimeters/second:    Common femoral artery: 100 and triphasic    Deep femoral artery: 64 and biphasic    Superficial femoral artery, proximal: 134 and triphasic    Superficial femoral artery, mid portion: 93 and triphasic    Superficial femoral artery, distal: 82 and triphasic    Popliteal artery: 64 and triphasic    Posterior tibial artery: 82 and monophasic    Anterior tibial artery: 322 and monophasic

## 2020-02-15 NOTE — SUBJECTIVE & OBJECTIVE
Interval Hx:  S/P left partial 5th ray amputation (DOS: 2/13/20 per Dr. Peterson).  Patient Seen at bedside for dressing change.  Patient denies F/C/N/V.  Pain controlled.  Dressings discheveld.  Patient ocmplaining she does not get good support at home.  States she can stand, but does not walk well.  Denies pain in calves.    Scheduled Meds:   ceFEPime (MAXIPIME) IVPB  2 g Intravenous Q8H    lisinopril  10 mg Oral Daily    metroNIDAZOLE  500 mg Oral TID    senna-docusate 8.6-50 mg  1 tablet Oral Daily    vancomycin (VANCOCIN) IVPB  15 mg/kg Intravenous Q12H     Continuous Infusions:   sodium chloride 0.9% 100 mL/hr at 02/14/20 1222     PRN Meds:acetaminophen, Dextrose 10% Bolus, Dextrose 10% Bolus, Dextrose 10% Bolus, Dextrose 10% Bolus, glucagon (human recombinant), glucose, glucose, hydrALAZINE, HYDROcodone-acetaminophen, HYDROcodone-acetaminophen, melatonin, ondansetron, promethazine (PHENERGAN) IVPB, senna-docusate 8.6-50 mg, sodium chloride 0.9%, DIPH,PERTUS (ADACEL),TETANUS PF VAC (ADULT), Pharmacy to dose Vancomycin consult **AND** vancomycin - pharmacy to dose    Review of patient's allergies indicates:  No Known Allergies     History reviewed. No pertinent past medical history.  Past Surgical History:   Procedure Laterality Date    INCISION AND DRAINAGE FOOT Left 2/13/2020    Procedure: INCISION AND DRAINAGE, FOOT;  Surgeon: Mau Peterson DPM;  Location: [facility] OR [address] [place] [address];  Service: Podiatry;  Laterality: Left;    TYMPANOPLASTY         Family History     None        Tobacco Use    Smoking status: Never Smoker    Smokeless tobacco: Never Used   Substance and Sexual Activity    Alcohol use: Not on file     Comment: 3 glasses of wine/ week    Drug use: Never    Sexual activity: Not on file     Review of Systems   Constitutional: Negative for chills and fever.   HENT: Negative for facial swelling and hearing loss.    Respiratory: Negative for cough and shortness of breath.    Cardiovascular:  Negative for leg swelling.   Gastrointestinal: Negative for nausea and vomiting.   Musculoskeletal: Negative for arthralgias and joint swelling.   Skin: Positive for wound. Negative for rash.   Psychiatric/Behavioral: Negative for agitation and confusion.     Objective:     Vital Signs (Most Recent):  Temp: 98.8 °F (37.1 °C) (02/14/20 1500)  Pulse: 75 (02/14/20 1500)  Resp: 18 (02/14/20 1500)  BP: (!) 141/60 (02/14/20 1500)  SpO2: 95 % (02/14/20 1500) Vital Signs (24h Range):  Temp:  [97.5 °F (36.4 °C)-98.8 °F (37.1 °C)] 98.8 °F (37.1 °C)  Pulse:  [69-84] 75  Resp:  [13-18] 18  SpO2:  [95 %-100 %] 95 %  BP: ()/(50-82) 141/60     Weight: 79.4 kg (175 lb)  Body mass index is 28.25 kg/m².    Foot Exam    General  Orientation: alert and oriented to person, place, and time   Affect: appropriate       Right Foot/Ankle     Inspection and Palpation  Ecchymosis: none  Swelling: none     Neurovascular  Dorsalis pedis: 2+  Posterior tibial: 2+  Saphenous nerve sensation: diminished  Tibial nerve sensation: diminished  Superficial peroneal nerve sensation: diminished  Deep peroneal nerve sensation: diminished  Sural nerve sensation: diminished      Left Foot/Ankle      Inspection and Palpation  Ecchymosis: none    Neurovascular  Dorsalis pedis: 2+  Posterior tibial: 2+  Saphenous nerve sensation: diminished  Tibial nerve sensation: diminished  Superficial peroneal nerve sensation: diminished  Deep peroneal nerve sensation: diminished  Sural nerve sensation: diminished            Laboratory:  A1C:   Recent Labs   Lab 02/12/20 1928   HGBA1C 7.6*     CBC:   Recent Labs   Lab 02/14/20 0354   WBC 7.97   RBC 3.70*   HGB 11.1*   HCT 34.4*      MCV 93   MCH 30.0   MCHC 32.3     CMP:   Recent Labs   Lab 02/14/20 0354   *   CALCIUM 9.4   ALBUMIN 3.2*   PROT 7.4      K 4.2   CO2 26      BUN 8   CREATININE 0.8   ALKPHOS 79   ALT 11   AST 16   BILITOT 0.4     CRP:   Recent Labs   Lab 02/12/20 1928   CRP  7.1     ESR:   Recent Labs   Lab 02/12/20  1928   SEDRATE 88*     Wound Cultures:   Recent Labs   Lab 02/13/20  1030   LABAERO ESCHERICHIA COLI  Moderate  Susceptibility pending  *     Microbiology Results (last 7 days)     Procedure Component Value Units Date/Time    AFB Culture & Smear [[id number]] Collected:  02/13/20 2046    Order Status:  Completed Specimen:  Wound from Foot, Left Updated:  02/14/20 1550     AFB CULTURE STAIN No acid fast bacilli seen.    AFB Culture & Smear [[id number]] Collected:  02/13/20 2046    Order Status:  Completed Specimen:  Wound from Foot, Left Updated:  02/14/20 1550     AFB CULTURE STAIN No acid fast bacilli seen.    Narrative:       Left foot wound    Aerobic culture [[id number]]  (Abnormal) Collected:  02/13/20 1030    Order Status:  Completed Specimen:  Wound from Foot, Left Updated:  02/14/20 1333     Aerobic Bacterial Culture ESCHERICHIA COLI  Moderate  Susceptibility pending      Culture, Anaerobe [[id number]] Collected:  02/13/20 1030    Order Status:  Completed Specimen:  Wound from Foot, Left Updated:  02/14/20 0715     Anaerobic Culture Culture in progress    Gram stain [[id number]] Collected:  02/13/20 2046    Order Status:  Completed Specimen:  Wound from Foot, Left Updated:  02/13/20 2304     Gram Stain Result No WBC's      Rare Gram positive cocci    Narrative:       Left foot wound    Gram stain [[id number]] Collected:  02/13/20 2046    Order Status:  Completed Specimen:  Wound from Foot, Left Updated:  02/13/20 2256     Gram Stain Result Rare WBC's      Moderate Gram positive cocci    Blood culture x two cultures. Draw prior to antibiotics [[id number]] Collected:  02/12/20 1918    Order Status:  Completed Specimen:  Blood from Peripheral, Hand, Right Updated:  02/13/20 2212     Blood Culture, Routine No Growth to date      No Growth to date    Narrative:       Aerobic and anaerobic    Fungus culture [[id number]] Collected:  02/13/20 2046    Order Status:  Sent Specimen:   Wound from Foot, Left Updated:  02/13/20 2125    Culture, Anaerobic [[id number]] Collected:  02/13/20 2046    Order Status:  Sent Specimen:  Wound from Foot, Left Updated:  02/13/20 2124    Culture, Anaerobic [[id number]] Collected:  02/13/20 2046    Order Status:  Sent Specimen:  Wound from Foot, Left Updated:  02/13/20 2114    Fungus culture [[id number]] Collected:  02/13/20 2046    Order Status:  Sent Specimen:  Wound from Foot, Left Updated:  02/13/20 2113    Aerobic culture [[id number]] Collected:  02/13/20 2046    Order Status:  Sent Specimen:  Wound from Foot, Left Updated:  02/13/20 2112    Aerobic culture [[id number]] Collected:  02/13/20 2046    Order Status:  Sent Specimen:  Wound from Foot, Left Updated:  02/13/20 2046    Gram stain [[id number]] Collected:  02/13/20 1030    Order Status:  Completed Specimen:  Wound Updated:  02/13/20 2029     Gram Stain Result Moderate WBC's      Many Gram positive cocci      Many Gram negative rods    Blood culture x two cultures. Draw prior to antibiotics [[id number]] Collected:  02/12/20 1743    Order Status:  Completed Specimen:  Blood from Peripheral, Antecubital, Left Updated:  02/13/20 2012     Blood Culture, Routine No Growth to date      No Growth to date    Narrative:       Aerobic and anaerobic    Gram stain [[id number]]     Order Status:  Completed Specimen:  Wound from Foot, Left         Specimen (12h ago, onward)    None          Diagnostic Results:  Imaging Results           MRI Foot (Forefoot) Left W W/O Contrast (Final result)  Result time 02/13/20 01:26:44    Final result by Mirza Brown MD (02/13/20 01:26:44)                 Impression:      1.  Soft tissue irregularity along the dorsum/lateral aspect of the left forefoot with associated heterogeneous region of central non-enhancement concerning for abscess or necrotic tissue with dimensions as above.  There are scattered foci of susceptibility artifact throughout this region concerning for soft tissue  gas.    2.  Abnormal marrow edema and enhancement involving the 5th metatarsal and phalanges as well as the proximal and middle phalanges of the 4th toe concerning for associated osteomyelitis.    This report was flagged in Epic as abnormal.      Electronically signed by: Mirza Brown MD  Date:    02/13/2020  Time:    01:26             Narrative:    EXAMINATION:  MRI FOOT (FOREFOOT) LEFT W W/O CONTRAST    CLINICAL HISTORY:  Osteomyelitis suspected, foot swelling, diabetic;    TECHNIQUE:  Multiplanar, multisequence imaging of the left forefoot was performed before and after the administration of 9 cc gadolinium based IV contrast.    COMPARISON:  Left foot radiograph series 02/12/2020    FINDINGS:  There is significant soft tissue irregularity/ulceration along the dorsum/lateral aspect of the forefoot.  There is a large region of nonenhancing soft tissue along the lateral aspect of the forefoot measuring approximately 2.7 x 7.6 cm concerning for abscess or necrotic tissue (series 10, image 19).  This demonstrates heterogeneous T2 hyperintensity with scattered foci of susceptibility concerning for soft tissue gas.  There is abnormal marrow edema and enhancement involving the 5th metatarsal as well as the proximal, middle, and distal phalanges concerning for osteomyelitis.  Additional abnormal marrow edema and enhancement is present of the proximal and middle for phalanges of the 4th toe concerning for osteomyelitis.    There is diffuse edema of the plantar foot musculature.  No definite additional discrete collections identified.  There are mild degenerative changes of the midfoot.                                X-Ray Foot Complete Left (Final result)  Result time 02/12/20 20:17:22    Final result by Sharad Baker MD (02/12/20 20:17:22)                 Impression:      Osseous erosion involving the distal 5th metatarsal, proximal and middle phalanx of the 5th digit and proximal phalanx of the 4th digit.  There is  soft tissue thickening lateral to the 5th metatarsal with probable soft tissue air.  Findings suggest cellulitis with possible abscess and osteomyelitis.  Recommend orthopedic follow-up.    This report was flagged in Epic as abnormal.      Electronically signed by: Sharad Meek  Date:    02/12/2020  Time:    20:17             Narrative:    EXAMINATION:  XR FOOT COMPLETE 3 VIEW LEFT    CLINICAL HISTORY:  .  Unspecified infectious disease    TECHNIQUE:  AP, lateral and oblique views of the left foot were performed.    COMPARISON:  None    FINDINGS:  There is mild erosion noted to the distal 5th metatarsal head and shaft.  Minimal rotation of the adjacent proximal portion of the proximal phalanx of the 5th digit also.  There is soft tissue edema and thickening laterally.  Probable air in the soft tissues laterally also.  Findings suggest cellulitis with possible abscess and osteomyelitis.  Recommend follow-up.    There is erosion of the midportion of the proximal phalanx of the 4th digit also.  Limited visualization.    Mild erosion of the medial margin of the middle phalanx of the 5th digit also.                               X-Ray Chest 1 View (Final result)  Result time 02/12/20 18:37:00    Final result by Ramos Rushing MD (02/12/20 18:37:00)                 Impression:      No radiographic acute intrathoracic process seen.  Specifically, no focal consolidation.      Electronically signed by: Ramos Rushing MD  Date:    02/12/2020  Time:    18:37             Narrative:    EXAMINATION:  XR CHEST 1 VIEW    CLINICAL HISTORY:  Sepsis;    TECHNIQUE:  Single frontal view of the chest was performed.    COMPARISON:  None    FINDINGS:  Mild nonspecific elevation of the left hemidiaphragm.The lungs are clear, with normal appearance of pulmonary vasculature and no pleural effusion or pneumothorax.    The cardiac silhouette is normal in size. Suspected mild tortuosity of the thoracic aorta.  Hilar contours are within normal  limits.    Bones are intact.  PA and lateral views can be obtained.                                  Clinical Findings:  Ulcer Location: Left lateral dorsal foot  Measurements:  Periwound: atrophic  Drainage:serous sanguinous  Base: granular  Signs of infection: No purulence, erythema, edema, or malodor    Sutures intact

## 2020-02-15 NOTE — PLAN OF CARE
Plan of care reviewed and discussed with patient. Pt verbalizes understaning. Purposeful rounding done. All safety precautions in place. No falls this shift.

## 2020-02-15 NOTE — PROGRESS NOTES
[facility]  Podiatry  Progress Note    Patient Name: Ana Maria Souza  MRN: [id number]  Admission Date: 2/12/2020  Hospital Length of Stay: 2 days  Attending Physician: Bee Jasso MD  Primary Care Provider: Primary Doctor No   Interval Hx:  S/P left partial 5th ray amputation (DOS: 2/13/20 per Dr. Peterson).  Patient Seen at bedside for dressing change.  Patient denies F/C/N/V.  Pain controlled.  Dressings discheveld.  Patient ocmplaining she does not get good support at home.  States she can stand, but does not walk well.  Denies pain in calves.    Scheduled Meds:   ceFEPime (MAXIPIME) IVPB  2 g Intravenous Q8H    lisinopril  10 mg Oral Daily    metroNIDAZOLE  500 mg Oral TID    senna-docusate 8.6-50 mg  1 tablet Oral Daily    vancomycin (VANCOCIN) IVPB  15 mg/kg Intravenous Q12H     Continuous Infusions:   sodium chloride 0.9% 100 mL/hr at 02/14/20 1222     PRN Meds:acetaminophen, Dextrose 10% Bolus, Dextrose 10% Bolus, Dextrose 10% Bolus, Dextrose 10% Bolus, glucagon (human recombinant), glucose, glucose, hydrALAZINE, HYDROcodone-acetaminophen, HYDROcodone-acetaminophen, melatonin, ondansetron, promethazine (PHENERGAN) IVPB, senna-docusate 8.6-50 mg, sodium chloride 0.9%, DIPH,PERTUS (ADACEL),TETANUS PF VAC (ADULT), Pharmacy to dose Vancomycin consult **AND** vancomycin - pharmacy to dose    Review of patient's allergies indicates:  No Known Allergies     History reviewed. No pertinent past medical history.  Past Surgical History:   Procedure Laterality Date    INCISION AND DRAINAGE FOOT Left 2/13/2020    Procedure: INCISION AND DRAINAGE, FOOT;  Surgeon: Mau Peterson DPM;  Location: [facility] OR [address] [place] [address];  Service: Podiatry;  Laterality: Left;    TYMPANOPLASTY         Family History     None        Tobacco Use    Smoking status: Never Smoker    Smokeless tobacco: Never Used   Substance and Sexual Activity    Alcohol use: Not on file     Comment: 3 glasses of wine/ week    Drug  use: Never    Sexual activity: Not on file     Review of Systems   Constitutional: Negative for chills and fever.   HENT: Negative for facial swelling and hearing loss.    Respiratory: Negative for cough and shortness of breath.    Cardiovascular: Negative for leg swelling.   Gastrointestinal: Negative for nausea and vomiting.   Musculoskeletal: Negative for arthralgias and joint swelling.   Skin: Positive for wound. Negative for rash.   Psychiatric/Behavioral: Negative for agitation and confusion.     Objective:     Vital Signs (Most Recent):  Temp: 98.8 °F (37.1 °C) (02/14/20 1500)  Pulse: 75 (02/14/20 1500)  Resp: 18 (02/14/20 1500)  BP: (!) 141/60 (02/14/20 1500)  SpO2: 95 % (02/14/20 1500) Vital Signs (24h Range):  Temp:  [97.5 °F (36.4 °C)-98.8 °F (37.1 °C)] 98.8 °F (37.1 °C)  Pulse:  [69-84] 75  Resp:  [13-18] 18  SpO2:  [95 %-100 %] 95 %  BP: ()/(50-82) 141/60     Weight: 79.4 kg (175 lb)  Body mass index is 28.25 kg/m².    Foot Exam    General  Orientation: alert and oriented to person, place, and time   Affect: appropriate       Right Foot/Ankle     Inspection and Palpation  Ecchymosis: none  Swelling: none     Neurovascular  Dorsalis pedis: 2+  Posterior tibial: 2+  Saphenous nerve sensation: diminished  Tibial nerve sensation: diminished  Superficial peroneal nerve sensation: diminished  Deep peroneal nerve sensation: diminished  Sural nerve sensation: diminished      Left Foot/Ankle      Inspection and Palpation  Ecchymosis: none    Neurovascular  Dorsalis pedis: 2+  Posterior tibial: 2+  Saphenous nerve sensation: diminished  Tibial nerve sensation: diminished  Superficial peroneal nerve sensation: diminished  Deep peroneal nerve sensation: diminished  Sural nerve sensation: diminished            Laboratory:  A1C:   Recent Labs   Lab 02/12/20  1928   HGBA1C 7.6*     CBC:   Recent Labs   Lab 02/14/20  0354   WBC 7.97   RBC 3.70*   HGB 11.1*   HCT 34.4*      MCV 93   MCH 30.0   MCHC 32.3      CMP:   Recent Labs   Lab 02/14/20  0354   *   CALCIUM 9.4   ALBUMIN 3.2*   PROT 7.4      K 4.2   CO2 26      BUN 8   CREATININE 0.8   ALKPHOS 79   ALT 11   AST 16   BILITOT 0.4     CRP:   Recent Labs   Lab 02/12/20 1928   CRP 7.1     ESR:   Recent Labs   Lab 02/12/20 1928   SEDRATE 88*     Wound Cultures:   Recent Labs   Lab 02/13/20  1030   LABAERO ESCHERICHIA COLI  Moderate  Susceptibility pending  *     Microbiology Results (last 7 days)     Procedure Component Value Units Date/Time    AFB Culture & Smear [[id number]] Collected:  02/13/20 2046    Order Status:  Completed Specimen:  Wound from Foot, Left Updated:  02/14/20 1550     AFB CULTURE STAIN No acid fast bacilli seen.    AFB Culture & Smear [[id number]] Collected:  02/13/20 2046    Order Status:  Completed Specimen:  Wound from Foot, Left Updated:  02/14/20 1550     AFB CULTURE STAIN No acid fast bacilli seen.    Narrative:       Left foot wound    Aerobic culture [[id number]]  (Abnormal) Collected:  02/13/20 1030    Order Status:  Completed Specimen:  Wound from Foot, Left Updated:  02/14/20 1333     Aerobic Bacterial Culture ESCHERICHIA COLI  Moderate  Susceptibility pending      Culture, Anaerobe [[id number]] Collected:  02/13/20 1030    Order Status:  Completed Specimen:  Wound from Foot, Left Updated:  02/14/20 0715     Anaerobic Culture Culture in progress    Gram stain [[id number]] Collected:  02/13/20 2046    Order Status:  Completed Specimen:  Wound from Foot, Left Updated:  02/13/20 2304     Gram Stain Result No WBC's      Rare Gram positive cocci    Narrative:       Left foot wound    Gram stain [[id number]] Collected:  02/13/20 2046    Order Status:  Completed Specimen:  Wound from Foot, Left Updated:  02/13/20 2256     Gram Stain Result Rare WBC's      Moderate Gram positive cocci    Blood culture x two cultures. Draw prior to antibiotics [[id number]] Collected:  02/12/20 1918    Order Status:  Completed Specimen:  Blood from  Peripheral, Hand, Right Updated:  02/13/20 2212     Blood Culture, Routine No Growth to date      No Growth to date    Narrative:       Aerobic and anaerobic    Fungus culture [[id number]] Collected:  02/13/20 2046    Order Status:  Sent Specimen:  Wound from Foot, Left Updated:  02/13/20 2125    Culture, Anaerobic [[id number]] Collected:  02/13/20 2046    Order Status:  Sent Specimen:  Wound from Foot, Left Updated:  02/13/20 2124    Culture, Anaerobic [[id number]] Collected:  02/13/20 2046    Order Status:  Sent Specimen:  Wound from Foot, Left Updated:  02/13/20 2114    Fungus culture [[id number]] Collected:  02/13/20 2046    Order Status:  Sent Specimen:  Wound from Foot, Left Updated:  02/13/20 2113    Aerobic culture [[id number]] Collected:  02/13/20 2046    Order Status:  Sent Specimen:  Wound from Foot, Left Updated:  02/13/20 2112    Aerobic culture [[id number]] Collected:  02/13/20 2046    Order Status:  Sent Specimen:  Wound from Foot, Left Updated:  02/13/20 2046    Gram stain [[id number]] Collected:  02/13/20 1030    Order Status:  Completed Specimen:  Wound Updated:  02/13/20 2029     Gram Stain Result Moderate WBC's      Many Gram positive cocci      Many Gram negative rods    Blood culture x two cultures. Draw prior to antibiotics [[id number]] Collected:  02/12/20 1743    Order Status:  Completed Specimen:  Blood from Peripheral, Antecubital, Left Updated:  02/13/20 2012     Blood Culture, Routine No Growth to date      No Growth to date    Narrative:       Aerobic and anaerobic    Gram stain [[id number]]     Order Status:  Completed Specimen:  Wound from Foot, Left         Specimen (12h ago, onward)    None          Diagnostic Results:  Imaging Results           MRI Foot (Forefoot) Left W W/O Contrast (Final result)  Result time 02/13/20 01:26:44    Final result by Mirza Brown MD (02/13/20 01:26:44)                 Impression:      1.  Soft tissue irregularity along the dorsum/lateral aspect of the  left forefoot with associated heterogeneous region of central non-enhancement concerning for abscess or necrotic tissue with dimensions as above.  There are scattered foci of susceptibility artifact throughout this region concerning for soft tissue gas.    2.  Abnormal marrow edema and enhancement involving the 5th metatarsal and phalanges as well as the proximal and middle phalanges of the 4th toe concerning for associated osteomyelitis.    This report was flagged in Epic as abnormal.      Electronically signed by: Mirza Brown MD  Date:    02/13/2020  Time:    01:26             Narrative:    EXAMINATION:  MRI FOOT (FOREFOOT) LEFT W W/O CONTRAST    CLINICAL HISTORY:  Osteomyelitis suspected, foot swelling, diabetic;    TECHNIQUE:  Multiplanar, multisequence imaging of the left forefoot was performed before and after the administration of 9 cc gadolinium based IV contrast.    COMPARISON:  Left foot radiograph series 02/12/2020    FINDINGS:  There is significant soft tissue irregularity/ulceration along the dorsum/lateral aspect of the forefoot.  There is a large region of nonenhancing soft tissue along the lateral aspect of the forefoot measuring approximately 2.7 x 7.6 cm concerning for abscess or necrotic tissue (series 10, image 19).  This demonstrates heterogeneous T2 hyperintensity with scattered foci of susceptibility concerning for soft tissue gas.  There is abnormal marrow edema and enhancement involving the 5th metatarsal as well as the proximal, middle, and distal phalanges concerning for osteomyelitis.  Additional abnormal marrow edema and enhancement is present of the proximal and middle for phalanges of the 4th toe concerning for osteomyelitis.    There is diffuse edema of the plantar foot musculature.  No definite additional discrete collections identified.  There are mild degenerative changes of the midfoot.                                X-Ray Foot Complete Left (Final result)  Result time  02/12/20 20:17:22    Final result by Sharad Baker MD (02/12/20 20:17:22)                 Impression:      Osseous erosion involving the distal 5th metatarsal, proximal and middle phalanx of the 5th digit and proximal phalanx of the 4th digit.  There is soft tissue thickening lateral to the 5th metatarsal with probable soft tissue air.  Findings suggest cellulitis with possible abscess and osteomyelitis.  Recommend orthopedic follow-up.    This report was flagged in Epic as abnormal.      Electronically signed by: Sharad Baker  Date:    02/12/2020  Time:    20:17             Narrative:    EXAMINATION:  XR FOOT COMPLETE 3 VIEW LEFT    CLINICAL HISTORY:  .  Unspecified infectious disease    TECHNIQUE:  AP, lateral and oblique views of the left foot were performed.    COMPARISON:  None    FINDINGS:  There is mild erosion noted to the distal 5th metatarsal head and shaft.  Minimal rotation of the adjacent proximal portion of the proximal phalanx of the 5th digit also.  There is soft tissue edema and thickening laterally.  Probable air in the soft tissues laterally also.  Findings suggest cellulitis with possible abscess and osteomyelitis.  Recommend follow-up.    There is erosion of the midportion of the proximal phalanx of the 4th digit also.  Limited visualization.    Mild erosion of the medial margin of the middle phalanx of the 5th digit also.                               X-Ray Chest 1 View (Final result)  Result time 02/12/20 18:37:00    Final result by Ramos Rushing MD (02/12/20 18:37:00)                 Impression:      No radiographic acute intrathoracic process seen.  Specifically, no focal consolidation.      Electronically signed by: Ramos Rushing MD  Date:    02/12/2020  Time:    18:37             Narrative:    EXAMINATION:  XR CHEST 1 VIEW    CLINICAL HISTORY:  Sepsis;    TECHNIQUE:  Single frontal view of the chest was performed.    COMPARISON:  None    FINDINGS:  Mild nonspecific elevation of the  left hemidiaphragm.The lungs are clear, with normal appearance of pulmonary vasculature and no pleural effusion or pneumothorax.    The cardiac silhouette is normal in size. Suspected mild tortuosity of the thoracic aorta.  Hilar contours are within normal limits.    Bones are intact.  PA and lateral views can be obtained.                                  Clinical Findings:  Ulcer Location: Left lateral dorsal foot  Measurements:  Periwound: atrophic  Drainage:serous sanguinous  Base: granular  Signs of infection: No purulence, erythema, edema, or malodor    Sutures intact            Assessment/Plan:     Chronic pain of left ankle  Ankle x-ray not showing fracture, but does show arthritis, discussed that once wound is healed will consider ankle brace and other conservative measures for ankle pain.      Acute osteomyelitis of toe of left foot   Ana Maria Souza is a 69 y.o. 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 Peterson). Stable  Applied wound vac.  Vac set to 75 mmHg continuous therapy.   Podiatry to change every 2-3 days  -Abx per ID, appreciate recs,-continue IV antibiotics  -OR cultures cultures.  -No further surgical intervention planned at this time.  Will plan on doing wound care with wound vac.  -PT/OT for gait training and DME recommendations.  Patient states she does not have good support at home, may require SNF placement for IV antibiotic and further wound care needs.  Arterial studies showing PAD, vascular consulted.  -podiatry will follow.    Patient to be WBAT to heel for short distances in orthopedic shoe    DC Instructions:  Patient is to follow up with podiatry within 10 days of discharge.  Call [phone]  to make an appointment.  Home health/facility to do dressing changes every 2-3 days as follows:  Rinse with saline, pat dry, apply wound vac on 75mmHg slow continuous therapy.  Dress with 2 rolls cast padding and flexinet.            Type 2 diabetes mellitus,  without long-term current use of insulin  A1c 7.6  Per primary      I have reviewed and concur with the resident's history, physical, assessment, and plan.  I have personally interviewed and examined the patient at bedside.  See below addendum for my evaluation and additional findings.    Elliott Mcclain MD  Podiatry  [facility]

## 2020-02-15 NOTE — PT/OT/SLP EVAL
"Occupational Therapy   Evaluation and Discharge Note    Name: Ana Maria Souza  MRN: [id number]  Admitting Diagnosis:  Diabetic foot infection 2 Days Post-Op s/p L partial 5th ray amputation on 2/13    Recommendations:     Discharge Recommendations: home health OT  Discharge Equipment Recommendations:  none  Barriers to discharge:  None    Assessment:     Ana Maria Souza is a 69 y.o. female with a medical diagnosis of Diabetic foot infection. At this time, patient is able to complete self-care tasks without A and does not require further acute OT services.     Plan:     During this hospitalization, patient does not require further acute OT services.  Please re-consult if situation changes.    · Plan of Care Reviewed with: patient    Subjective     Chief Complaint: "I'm going to need a scooter."  Patient/Family Comments/goals: to get better and go home    Occupational Profile:  Living Environment: lives alone  Previous level of function: (I) with ADL and ambulation; uses public transportation  Roles and Routines: enjoys eating out for breakfast, lunch and dinner  Equipment Used at home:  cane, quad, shower chair  Assistance upon Discharge: none    Pain/Comfort:  · Pain Rating 1: 0/10  · Pain Rating Post-Intervention 1: 0/10    Patients cultural, spiritual, [employer] conflicts given the current situation: no    Objective:     Communicated with: RN prior to session.  Patient found supine with telemetry upon OT entry to room.    General Precautions: Standard, fall   Orthopedic Precautions:(pt allowed to weight bear through L heel only for short transfers)   Braces: (Darco shoe)     Occupational Performance:    Bed Mobility:    · Independent    Functional Mobility/Transfers:  · Patient completed Sit <> Stand Transfer with stand by assistance  with  rolling walker   · Patient completed Bed <> Chair Transfer using Step Transfer technique with stand by assistance with no assistive device  · Functional Mobility: NT " except two steps to chair using RW     Activities of Daily Living:  · Feeding:  independence    · Grooming: independence    · Bathing: stand by assistance seated and standing in chair  · Upper Body Dressing: independence    · Lower Body Dressing: modified independence    · Toileting: stand by assistance for pericare after urinating in diaper; pt able to don and doff diaper and perform hygiene without A    Cognitive/Visual Perceptual:  Oriented to: Person, Place, Time and Situation  Follows Commands/attention: Follows multistep  commands  Communication: clear/fluent  Memory:  No Deficits noted  Safety awareness/insight to disability: intact  Coping skills/emotional control: Appropriate to situation    Physical Exam:  Postural examination/scapula alignment:    -       No postural abnormalities identified  Sensation:    -       Intact  Upper Extremity Range of Motion:     -       Right Upper Extremity: WFL  -       Left Upper Extremity: WFL  Upper Extremity Strength:    -       Right Upper Extremity: WFL  -       Left Upper Extremity: WFL   Strength:    -       Right Upper Extremity: WFL  -       Left Upper Extremity: WFL  Fine Motor Coordination:    -       Intact  Gross motor coordination:   WFL    AMPAC 6 Click ADL:  AMPAC Total Score: 21    Treatment & Education:  Pt ed on OT POC  Pt completed sit <> stand multiple times during dressing, toileting and bathing from bed and chair  Education:    Patient left up in chair with all lines intact, call button in reach and RN notified    GOALS:   Multidisciplinary Problems     Occupational Therapy Goals     Not on file          Multidisciplinary Problems (Resolved)        Problem: Occupational Therapy Goal    Goal Priority Disciplines Outcome Interventions   Occupational Therapy Goal   (Resolved)     OT, PT/OT Met                    History:     History reviewed. No pertinent past medical history.    Past Surgical History:   Procedure Laterality Date    INCISION AND  DRAINAGE FOOT Left 2/13/2020    Procedure: INCISION AND DRAINAGE, FOOT;  Surgeon: Mau Peterson DPM;  Location: [facility] OR [address] [place] [address];  Service: Podiatry;  Laterality: Left;    TYMPANOPLASTY         Time Tracking:     OT Date of Treatment: 02/15/20  OT Start Time: 1005  OT Stop Time: 1026  OT Total Time (min): 21 min    Billable Minutes:Evaluation 10  Self Care/Home Management 11    TESFAYE Porter  2/15/2020

## 2020-02-15 NOTE — HPI
Pt is 70 yo F with hx of HTN and T2Dm who rpesented to ED 2 days ago aftre stepping on a nail 3 months ago and having a non-healing wound that worsened. Upon arrival she was diagnosed with wet gangrene and was taken to OR emergently by podiatry for debridement and amputation of her left 5th ray. She was started on IV abx and resuscitated. She has since stabilized with a normalized white coun, and has been afebrile. Vascular surgery was consulted to eval her blood supply for potential healing. She denies any claudication, and reports before her wound started 3 months ago she was very active. Denies any hx of CVA or MI.

## 2020-02-15 NOTE — PROGRESS NOTES
[facility] Hospital Medicine  Progress Note    Patient Name: Ana Maria Souza  MRN: [id number]  Patient Class: IP- Inpatient   Admission Date: 2/12/2020  Length of Stay: 3 days  Attending Physician: Bee Jasso MD  Primary Care Provider: Primary Doctor [facility] Medicine Team: [facility] HOSP MED K Bee Jasso MD    Subjective:     Principal Problem:Diabetic foot infection    Interval History: POD 2 from podiatry I/D 2/13    Review of Systems   Constitutional: Positive for chills and fatigue. Negative for fever.   HENT: Negative for congestion and trouble swallowing.    Eyes: Negative for discharge and itching.   Respiratory: Negative for apnea, cough, chest tightness and shortness of breath.    Cardiovascular: Negative for chest pain and leg swelling.   Gastrointestinal: Negative for abdominal distention and abdominal pain.   Endocrine: Negative for cold intolerance, polydipsia and polyphagia.   Genitourinary: Negative for difficulty urinating, dysuria and flank pain.   Musculoskeletal: Negative for arthralgias and back pain.   Neurological: Negative for dizziness, syncope, facial asymmetry and light-headedness.   Hematological: Does not bruise/bleed easily.   Psychiatric/Behavioral: Negative for decreased concentration and hallucinations.     Objective:     Vital Signs (Most Recent):  Temp: 97.7 °F (36.5 °C) (02/15/20 1120)  Pulse: 74 (02/15/20 1121)  Resp: 18 (02/15/20 1120)  BP: (!) 140/67 (02/15/20 1120)  SpO2: 98 % (02/15/20 1120) Vital Signs (24h Range):  Temp:  [96.1 °F (35.6 °C)-98.8 °F (37.1 °C)] 97.7 °F (36.5 °C)  Pulse:  [60-87] 74  Resp:  [16-20] 18  SpO2:  [93 %-98 %] 98 %  BP: (137-172)/(60-75) 140/67     Weight: 79.4 kg (175 lb)  Body mass index is 28.25 kg/m².    Intake/Output Summary (Last 24 hours) at 2/15/2020 1408  Last data filed at 2/15/2020 1033  Gross per 24 hour   Intake 1003.33 ml   Output --   Net 1003.33 ml      Constitutional: Appears well developed  and well nourished.  Foul smelling odor extending into the hallway  Head: Normocephalic and atraumatic.   Mouth/Throat: Oropharynx is clear and moist.   Eyes: EOM are normal. Pupils are equal, round, and reactive to light. No scleral icterus.   Neck: Normal range of motion. Neck supple.   Cardiovascular: Normal heart rate.  Regular heart rhythm.  No murmur heard.  Pulmonary/Chest: Effort normal. No respiratory distress. No wheezes, rales, or rhonchi  Abdominal: Soft. Bowel sounds are normal.  No distension.  No tenderness  Musculoskeletal: Normal range of motion. Left foot wrapped with foul smelling odor  Neurological: Alert and oriented to person, place, and time.   Skin: Skin is warm and dry.   Psychiatric: Normal mood and affect. Behavior is normal.          Overview/Hospital Course:   History of Present Illness:     Ms. Ana Maria Souza is a 69 y.o. female with T2DM not on medication, who presents to the ED for evaluation of a foul smelling left foot wound.  She mentons that about 3 months prior to admission, she stepped on a nail.  She didn't seek medical care at that time, but she developed a small found on the dorsum of her left foot.  She started to use OTC creams about 2 weeks ago when she saw that the wound was not healing and seemed to be opening more.  She was using Silver Sulfadiazine and Ammonia Acetate creams, and the wound began to harden with a thick eschar over the next few days.  She then pulled off the eschar, and noticed white purulent material was produced along with a foul smelling odor.  Over time, she has noticed some swelling and erythema extending to her left leg.  She decided to wait and go to the ER once her Medicaid was finalized.  She denies any fevers or chills.  She denies any difficulty with ambulation.  She has been taking Tylenol and Tylenol Extra Strength for pain.     Past Medical History: Patient has no past medical history on file.        Past Surgical History: Patient has a  past surgical history that includes Tympanoplasty.        Social History: Patient reports that she has never smoked. She has never used smokeless tobacco. She reports that she does not use drugs.        Family History: Patient's family history is not on file.     Significant Labs:   A1C:   Recent Labs   Lab 02/12/20  1928   HGBA1C 7.6*     CBC:   Recent Labs   Lab 02/14/20  0354 02/15/20  0447   WBC 7.97 9.15   HGB 11.1* 9.9*   HCT 34.4* 31.5*    218       Significant Imaging: I have reviewed all pertinent imaging results/findings within the past 24 hours.  MRI with gas and abscess of foot. Left side    Assessment/Plan:      Active Diagnoses:    Diagnosis Date Noted POA    PRINCIPAL PROBLEM:  Diabetic foot infection [E11.628, L08.9] 02/12/2020 Yes    Acute osteomyelitis of toe of left foot [M86.172] 02/13/2020 Unknown    Chronic pain of left ankle [M25.572, G89.29] 02/13/2020 Unknown    Type 2 diabetes mellitus, without long-term current use of insulin [E11.9] 02/12/2020 Yes    Essential hypertension [I10] 02/12/2020 Yes             Diabetic Foot Wound  · Afebrile and without leukocytosis.  ESR 88, CRP 7  · HbA1c 7.6  · MRI with left forefoot with associated heterogeneous region of central non-enhancement concerning for abscess or necrotic tissue with dimensions as above.  There are scattered foci of susceptibility artifact throughout this region concerning for soft tissue gas. Abnormal marrow edema and enhancement involving the 5th metatarsal and phalanges as well as the proximal and middle phalanges of the 4th toe concerning for associated osteomyelitis.  · Podiatry consulted, appreciate assistance. I/D on 2/13.   · ID consulted, appreciate assistance.  Covering GPC from stain and E coli pending final cultures  · Wound vac in place, darco boot onm, emphasized heel walking to patient  · Walker ordered      Essential Hypertension  · Chronic issue but not on medication at home  · Start Lisinopril 10mg.   Titrate up as needed     Type 2 DM without Long Term Insulin Use  · HbA1c 7.6  · Not on medication at home  · SSI with POCT accuchecks and Diabetic diet        Diet:  diabetic   VTE PPx:  Ambulatory  Goals of Care:  Full       Disposition:  Pending Podiatry and ID recs but most likely will need SNF/NH placement for long term ABX as patient does not believe she can do this at home.     Discharge Needs:  TBD    Bee Jasso MD  Department of Hospital Medicine   [facility]

## 2020-02-15 NOTE — SUBJECTIVE & OBJECTIVE
Interval History:  POD#2 left 5th ray amputation.  Wound vac in place.   Clean margin swabs showing E coli and e Faecalis (susceptibilities pending).   No complaints.     Review of Systems   Constitutional: Negative for activity change, appetite change, chills, diaphoresis, fatigue and fever.   HENT: Negative.    Eyes: Negative.    Respiratory: Negative for cough, shortness of breath and wheezing.    Cardiovascular: Positive for leg swelling. Negative for chest pain and palpitations.   Gastrointestinal: Negative for abdominal pain, constipation, diarrhea, nausea and vomiting.   Genitourinary: Negative for difficulty urinating, dysuria and flank pain.   Musculoskeletal: Positive for joint swelling. Negative for arthralgias, back pain and neck pain.   Skin: Positive for color change and wound. Negative for rash.   Neurological: Negative for dizziness, weakness, light-headedness and headaches.   Hematological: Negative for adenopathy.   Psychiatric/Behavioral: Negative for agitation, behavioral problems and confusion.     Objective:     Vital Signs (Most Recent):  Temp: 98.2 °F (36.8 °C) (02/15/20 1557)  Pulse: 63 (02/15/20 1557)  Resp: 18 (02/15/20 1557)  BP: (!) 162/78 (02/15/20 1557)  SpO2: 98 % (02/15/20 1557) Vital Signs (24h Range):  Temp:  [96.1 °F (35.6 °C)-98.2 °F (36.8 °C)] 98.2 °F (36.8 °C)  Pulse:  [60-87] 63  Resp:  [16-20] 18  SpO2:  [93 %-98 %] 98 %  BP: (137-172)/(62-78) 162/78     Weight: 79.4 kg (175 lb)  Body mass index is 28.25 kg/m².    Estimated Creatinine Clearance: 69.5 mL/min (based on SCr of 0.8 mg/dL).    Physical Exam   Constitutional: She is oriented to person, place, and time. She appears well-developed and well-nourished. No distress.   HENT:   Head: Normocephalic and atraumatic.   Eyes: Conjunctivae are normal. No scleral icterus.   Neck: Normal range of motion.   Cardiovascular: Normal rate and regular rhythm.   No murmur heard.  Pulmonary/Chest: Effort normal and breath sounds  normal. No respiratory distress. She has no wheezes. She has no rales.   Abdominal: Soft.   Musculoskeletal: She exhibits edema. She exhibits no tenderness.   Left foot surgical dressing in place with wound vac  Podiatry photos reviewed. See below   Neurological: She is alert and oriented to person, place, and time.   Skin: Skin is warm and dry. She is not diaphoretic.   Psychiatric: She has a normal mood and affect. Her behavior is normal.   Vitals reviewed.          Significant Labs:   Blood Culture:   Recent Labs   Lab 02/12/20  1743 02/12/20  1918   LABBLOO No Growth to date  No Growth to date  No Growth to date No Growth to date  No Growth to date  No Growth to date     CBC:   Recent Labs   Lab 02/14/20  0354 02/15/20  0447   WBC 7.97 9.15   HGB 11.1* 9.9*   HCT 34.4* 31.5*    218     CMP:   Recent Labs   Lab 02/14/20  0354 02/15/20  0447    140   K 4.2 3.7    105   CO2 26 30*   * 170*   BUN 8 12   CREATININE 0.8 0.8   CALCIUM 9.4 8.9   PROT 7.4 6.6   ALBUMIN 3.2* 2.7*   BILITOT 0.4 0.3   ALKPHOS 79 70   AST 16 11   ALT 11 8*   ANIONGAP 8 5*   EGFRNONAA >60.0 >60.0     Wound Culture:   Recent Labs   Lab 02/13/20  1030 02/13/20 2046   LABAERO ESCHERICHIA COLI  Many  No other significant isolate  * ESCHERICHIA COLI  Few  Susceptibility pending  *  ENTEROCOCCUS FAECALIS  Moderate  Susceptibility pending  No other significant isolate  *       Significant Imaging: I have reviewed all pertinent imaging results/findings within the past 24 hours.

## 2020-02-15 NOTE — CONSULTS
[facility]  Vascular Surgery  Consult Note    Inpatient consult to Vascular Surgery  Consult performed by: Kelby Carrasco MD  Consult ordered by: Elliott Lasister MD  Reason for consult: Left foot wound        Subjective:     Chief Complaint/Reason for Admission: Left foot wound    History of Present Illness: Pt is 70 yo F with hx of HTN and T2Dm who rpesented to ED 2 days ago aftre stepping on a nail 3 months ago and having a non-healing wound that worsened. Upon arrival she was diagnosed with wet gangrene and was taken to OR emergently by podiatry for debridement and amputation of her left 5th ray. She was started on IV abx and resuscitated. She has since stabilized with a normalized white coun, and has been afebrile. Vascular surgery was consulted to eval her blood supply for potential healing. She denies any claudication, and reports before her wound started 3 months ago she was very active. Denies any hx of CVA or MI.     Medications Prior to Admission   Medication Sig Dispense Refill Last Dose    aspirin 325 MG tablet Take 325 mg by mouth once daily.   2/12/2020       Review of patient's allergies indicates:  No Known Allergies    History reviewed. No pertinent past medical history.  Past Surgical History:   Procedure Laterality Date    INCISION AND DRAINAGE FOOT Left 2/13/2020    Procedure: INCISION AND DRAINAGE, FOOT;  Surgeon: Mau Peterson DPM;  Location: [facility] OR [address] [place] [address];  Service: Podiatry;  Laterality: Left;    TYMPANOPLASTY       Family History     None        Tobacco Use    Smoking status: Never Smoker    Smokeless tobacco: Never Used   Substance and Sexual Activity    Alcohol use: Not on file     Comment: 3 glasses of wine/ week    Drug use: Never    Sexual activity: Not on file     Review of Systems   All other systems reviewed and are negative.    Objective:     Vital Signs (Most Recent):  Temp: 97.7 °F (36.5 °C) (02/15/20 1120)  Pulse: 74 (02/15/20 1121)  Resp: 18  (02/15/20 1120)  BP: (!) 140/67 (02/15/20 1120)  SpO2: 98 % (02/15/20 1120) Vital Signs (24h Range):  Temp:  [96.1 °F (35.6 °C)-97.7 °F (36.5 °C)] 97.7 °F (36.5 °C)  Pulse:  [60-87] 74  Resp:  [16-20] 18  SpO2:  [93 %-98 %] 98 %  BP: (137-172)/(62-75) 140/67     Weight: 79.4 kg (175 lb)  Body mass index is 28.25 kg/m².    Physical Exam   Constitutional: She is oriented to person, place, and time. She appears well-developed and well-nourished.   Cardiovascular: Normal rate and regular rhythm.   Pulmonary/Chest: Effort normal. No respiratory distress.   Abdominal: Soft. She exhibits no distension.   Musculoskeletal: Normal range of motion.   LLE with biphasic pedal signals  RLE with biphasic PT, no DP  Left foot with wound vac in place, ankle is same warmth as right, appropriately tender  No tenderness in calf  Motor and sensation intact  Also has small ulcer at base of left 1st tos   Neurological: She is alert and oriented to person, place, and time.   Skin: Skin is warm and dry.   Psychiatric: She has a normal mood and affect. Her behavior is normal. Judgment and thought content normal.   Nursing note and vitals reviewed.      Significant Labs:  CBC:   Recent Labs   Lab 02/15/20  0447   WBC 9.15   RBC 3.31*   HGB 9.9*   HCT 31.5*      MCV 95   MCH 29.9   MCHC 31.4*     CMP:   Recent Labs   Lab 02/15/20  0447   *   CALCIUM 8.9   ALBUMIN 2.7*   PROT 6.6      K 3.7   CO2 30*      BUN 12   CREATININE 0.8   ALKPHOS 70   ALT 8*   AST 11   BILITOT 0.3       Significant Diagnostics:  I have reviewed all pertinent imaging results/findings within the past 24 hours.   The peak systolic velocities on the right are as follows, in centimeters/second:    Common femoral artery: 101 and triphasic    Deep femoral artery: 57 and biphasic    Superficial femoral artery, proximal: 113 and triphasic    Superficial femoral artery, mid portion: 91 and triphasic    Superficial femoral artery, distal: 81 and  triphasic    Popliteal artery: 58 and triphasic    Posterior tibial artery: 34 and monophasic    Anterior tibial artery: 232 and monophasic    The peak systolic velocities on the left are as follows, in centimeters/second:    Common femoral artery: 100 and triphasic    Deep femoral artery: 64 and biphasic    Superficial femoral artery, proximal: 134 and triphasic    Superficial femoral artery, mid portion: 93 and triphasic    Superficial femoral artery, distal: 82 and triphasic    Popliteal artery: 64 and triphasic    Posterior tibial artery: 82 and monophasic    Anterior tibial artery: 322 and monophasic    Assessment/Plan:     * Diabetic foot infection  Pt is 68 yo F with abnormal arterial US likely 2/2 chronic PAD    Will plan for LLE angiogram 2/17  Recommend ASA and statin  Will obtain consent tomorrow  Please make NPO midnight before procedure    Please call with any questions or concerns        Thank you for your consult. I will follow-up with patient. Please contact us if you have any additional questions.    Kelby Carrasco MD  Vascular Surgery  [facility]

## 2020-02-15 NOTE — CONSULTS
Double lumen PICC placed in right brachial vein of JASON, 40cm in length with 0cm exposed and 31cm arm circumference. Lot#[id number].

## 2020-02-15 NOTE — ASSESSMENT & PLAN NOTE
Ana Maria Souza is a 69 y.o. 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 Peterson). Stable  Applied wound vac.  Vac set to 75 mmHg continuous therapy.   Podiatry to change every 2-3 days  -Abx per ID, appreciate recs,-continue IV antibiotics  -OR cultures cultures.  -No further surgical intervention planned at this time.  Will plan on doing wound care with wound vac.  -PT/OT for gait training and DME recommendations.  Patient states she does not have good support at home, may require SNF placement for IV antibiotic and further wound care needs.  Arterial studies showing PAD, vascular consulted.  -podiatry will follow.    Patient to be WBAT to heel for short distances in orthopedic shoe    DC Instructions:  Patient is to follow up with podiatry within 10 days of discharge.  Call [phone]  to make an appointment.  Home health/facility to do dressing changes every 2-3 days as follows:  Rinse with saline, pat dry, apply wound vac on 75mmHg slow continuous therapy.  Dress with 2 rolls cast padding and flexinet.

## 2020-02-15 NOTE — ANESTHESIA POSTPROCEDURE EVALUATION
Anesthesia Post Evaluation    Patient: Ana Maria Souza    Procedure(s) Performed: Procedure(s) (LRB):  INCISION AND DRAINAGE, FOOT (Left)    Final Anesthesia Type: general    Patient location during evaluation: floor  Patient participation: Yes- Able to Participate  Level of consciousness: awake and alert and oriented  Post-procedure vital signs: reviewed and stable  Pain management: adequate  Airway patency: patent    PONV status at discharge: No PONV  Anesthetic complications: no      Cardiovascular status: blood pressure returned to baseline and hemodynamically stable  Respiratory status: unassisted and spontaneous ventilation  Hydration status: euvolemic  Follow-up not needed.          Vitals Value Taken Time   /60 2/14/2020  3:00 PM   Temp 37.1 °C (98.8 °F) 2/14/2020  3:00 PM   Pulse 87 2/14/2020  7:20 PM   Resp 18 2/14/2020  3:00 PM   SpO2 95 % 2/14/2020  3:00 PM         Event Time     Out of Recovery 02/13/2020 21:15:00          Pain/Josseline Score: Pain Rating Prior to Med Admin: 8 (2/14/2020  7:22 PM)  Josseline Score: 10 (2/13/2020  9:15 PM)

## 2020-02-16 LAB
ALBUMIN SERPL BCP-MCNC: 2.9 G/DL (ref 3.5–5.2)
ALP SERPL-CCNC: 70 U/L (ref 55–135)
ALT SERPL W/O P-5'-P-CCNC: 9 U/L (ref 10–44)
ANION GAP SERPL CALC-SCNC: 9 MMOL/L (ref 8–16)
AST SERPL-CCNC: 12 U/L (ref 10–40)
BASOPHILS # BLD AUTO: 0.04 K/UL (ref 0–0.2)
BASOPHILS NFR BLD: 0.6 % (ref 0–1.9)
BILIRUB SERPL-MCNC: 0.3 MG/DL (ref 0.1–1)
BUN SERPL-MCNC: 8 MG/DL (ref 8–23)
CALCIUM SERPL-MCNC: 9.3 MG/DL (ref 8.7–10.5)
CHLORIDE SERPL-SCNC: 106 MMOL/L (ref 95–110)
CO2 SERPL-SCNC: 25 MMOL/L (ref 23–29)
CREAT SERPL-MCNC: 0.8 MG/DL (ref 0.5–1.4)
DIFFERENTIAL METHOD: ABNORMAL
EOSINOPHIL # BLD AUTO: 0.3 K/UL (ref 0–0.5)
EOSINOPHIL NFR BLD: 3.7 % (ref 0–8)
ERYTHROCYTE [DISTWIDTH] IN BLOOD BY AUTOMATED COUNT: 14.2 % (ref 11.5–14.5)
EST. GFR  (AFRICAN AMERICAN): >60 ML/MIN/1.73 M^2
EST. GFR  (NON AFRICAN AMERICAN): >60 ML/MIN/1.73 M^2
GLUCOSE SERPL-MCNC: 160 MG/DL (ref 70–110)
HCT VFR BLD AUTO: 34.5 % (ref 37–48.5)
HGB BLD-MCNC: 10.6 G/DL (ref 12–16)
IMM GRANULOCYTES # BLD AUTO: 0.02 K/UL (ref 0–0.04)
IMM GRANULOCYTES NFR BLD AUTO: 0.3 % (ref 0–0.5)
LYMPHOCYTES # BLD AUTO: 2.4 K/UL (ref 1–4.8)
LYMPHOCYTES NFR BLD: 33.6 % (ref 18–48)
MAGNESIUM SERPL-MCNC: 1.7 MG/DL (ref 1.6–2.6)
MCH RBC QN AUTO: 30 PG (ref 27–31)
MCHC RBC AUTO-ENTMCNC: 30.7 G/DL (ref 32–36)
MCV RBC AUTO: 98 FL (ref 82–98)
MONOCYTES # BLD AUTO: 0.8 K/UL (ref 0.3–1)
MONOCYTES NFR BLD: 11.6 % (ref 4–15)
NEUTROPHILS # BLD AUTO: 3.6 K/UL (ref 1.8–7.7)
NEUTROPHILS NFR BLD: 50.2 % (ref 38–73)
NRBC BLD-RTO: 0 /100 WBC
PHOSPHATE SERPL-MCNC: 3.3 MG/DL (ref 2.7–4.5)
PLATELET # BLD AUTO: 201 K/UL (ref 150–350)
PMV BLD AUTO: 10.6 FL (ref 9.2–12.9)
POCT GLUCOSE: 150 MG/DL (ref 70–110)
POCT GLUCOSE: 156 MG/DL (ref 70–110)
POCT GLUCOSE: 160 MG/DL (ref 70–110)
POCT GLUCOSE: 177 MG/DL (ref 70–110)
POCT GLUCOSE: 179 MG/DL (ref 70–110)
POCT GLUCOSE: 255 MG/DL (ref 70–110)
POTASSIUM SERPL-SCNC: 4.2 MMOL/L (ref 3.5–5.1)
PROT SERPL-MCNC: 6.9 G/DL (ref 6–8.4)
RBC # BLD AUTO: 3.53 M/UL (ref 4–5.4)
SODIUM SERPL-SCNC: 140 MMOL/L (ref 136–145)
VANCOMYCIN TROUGH SERPL-MCNC: 20.3 UG/ML (ref 10–22)
WBC # BLD AUTO: 7.24 K/UL (ref 3.9–12.7)

## 2020-02-16 PROCEDURE — 63600175 PHARM REV CODE 636 W HCPCS: Performed by: INTERNAL MEDICINE

## 2020-02-16 PROCEDURE — 83735 ASSAY OF MAGNESIUM: CPT

## 2020-02-16 PROCEDURE — 85025 COMPLETE CBC W/AUTO DIFF WBC: CPT

## 2020-02-16 PROCEDURE — 97110 THERAPEUTIC EXERCISES: CPT | Mod: CQ

## 2020-02-16 PROCEDURE — 11000001 HC ACUTE MED/SURG PRIVATE ROOM

## 2020-02-16 PROCEDURE — 99233 SBSQ HOSP IP/OBS HIGH 50: CPT | Mod: ,,, | Performed by: NURSE PRACTITIONER

## 2020-02-16 PROCEDURE — 80202 ASSAY OF VANCOMYCIN: CPT

## 2020-02-16 PROCEDURE — 80053 COMPREHEN METABOLIC PANEL: CPT

## 2020-02-16 PROCEDURE — 25000003 PHARM REV CODE 250: Performed by: HOSPITALIST

## 2020-02-16 PROCEDURE — 97530 THERAPEUTIC ACTIVITIES: CPT | Mod: CQ

## 2020-02-16 PROCEDURE — 36415 COLL VENOUS BLD VENIPUNCTURE: CPT

## 2020-02-16 PROCEDURE — 63600175 PHARM REV CODE 636 W HCPCS: Performed by: PHYSICIAN ASSISTANT

## 2020-02-16 PROCEDURE — 99232 PR SUBSEQUENT HOSPITAL CARE,LEVL II: ICD-10-PCS | Mod: ,,, | Performed by: HOSPITALIST

## 2020-02-16 PROCEDURE — 94761 N-INVAS EAR/PLS OXIMETRY MLT: CPT

## 2020-02-16 PROCEDURE — 99233 PR SUBSEQUENT HOSPITAL CARE,LEVL III: ICD-10-PCS | Mod: ,,, | Performed by: NURSE PRACTITIONER

## 2020-02-16 PROCEDURE — 84100 ASSAY OF PHOSPHORUS: CPT

## 2020-02-16 PROCEDURE — 99232 SBSQ HOSP IP/OBS MODERATE 35: CPT | Mod: ,,, | Performed by: HOSPITALIST

## 2020-02-16 PROCEDURE — 63600175 PHARM REV CODE 636 W HCPCS: Performed by: HOSPITALIST

## 2020-02-16 PROCEDURE — 63600175 PHARM REV CODE 636 W HCPCS: Performed by: NURSE PRACTITIONER

## 2020-02-16 PROCEDURE — A4216 STERILE WATER/SALINE, 10 ML: HCPCS | Performed by: HOSPITALIST

## 2020-02-16 PROCEDURE — 25000003 PHARM REV CODE 250: Performed by: INTERNAL MEDICINE

## 2020-02-16 RX ORDER — VANCOMYCIN HCL IN 5 % DEXTROSE 1G/250ML
1000 PLASTIC BAG, INJECTION (ML) INTRAVENOUS
Status: DISCONTINUED | OUTPATIENT
Start: 2020-02-16 | End: 2020-02-17

## 2020-02-16 RX ADMIN — Medication 10 ML: at 06:02

## 2020-02-16 RX ADMIN — METRONIDAZOLE 500 MG: 500 TABLET ORAL at 03:02

## 2020-02-16 RX ADMIN — VANCOMYCIN HYDROCHLORIDE 1000 MG: 1 INJECTION, POWDER, LYOPHILIZED, FOR SOLUTION INTRAVENOUS at 10:02

## 2020-02-16 RX ADMIN — DOCUSATE SODIUM - SENNOSIDES 1 TABLET: 50; 8.6 TABLET, FILM COATED ORAL at 09:02

## 2020-02-16 RX ADMIN — ACETAMINOPHEN 650 MG: 325 TABLET ORAL at 05:02

## 2020-02-16 RX ADMIN — CEFTRIAXONE 2 G: 2 INJECTION, SOLUTION INTRAVENOUS at 06:02

## 2020-02-16 RX ADMIN — METRONIDAZOLE 500 MG: 500 TABLET ORAL at 09:02

## 2020-02-16 RX ADMIN — Medication 10 ML: at 12:02

## 2020-02-16 RX ADMIN — METRONIDAZOLE 500 MG: 500 TABLET ORAL at 10:02

## 2020-02-16 RX ADMIN — VANCOMYCIN HYDROCHLORIDE 1000 MG: 1 INJECTION, POWDER, LYOPHILIZED, FOR SOLUTION INTRAVENOUS at 11:02

## 2020-02-16 RX ADMIN — INSULIN ASPART 1 UNITS: 100 INJECTION, SOLUTION INTRAVENOUS; SUBCUTANEOUS at 10:02

## 2020-02-16 RX ADMIN — CEFEPIME 2 G: 2 INJECTION, POWDER, FOR SOLUTION INTRAVENOUS at 09:02

## 2020-02-16 RX ADMIN — ENOXAPARIN SODIUM 40 MG: 100 INJECTION SUBCUTANEOUS at 05:02

## 2020-02-16 RX ADMIN — CEFEPIME 2 G: 2 INJECTION, POWDER, FOR SOLUTION INTRAVENOUS at 02:02

## 2020-02-16 NOTE — PROGRESS NOTES
[facility]  Infectious Disease  Progress Note    Patient Name: Ana Maria Souza  MRN: [id number]  Admission Date: 2/12/2020  Length of Stay: 3 days  Attending Physician: Bee Jasso MD  Primary Care Provider: Primary Doctor No    Isolation Status: No active isolations  Assessment/Plan:      Acute osteomyelitis of toe of left foot     69 year old woman with DM (HA1C 7.6) who presented to ED with a foul smelling left foot wound that has been present since she stepped on nail approx 3 months ago.  Did not seek medical care and has been treating herself with local OTC creams.  Developed an eschar which she pulled off and noticed a white purulent material with foul odor.  Subsequently developed erythema and swelling of left leg.  Afebrile, no leukocytosis on arrival to ED.  Imaging concerning for osteomyelitis of left 4th and 5th toes and soft tissue gas.       Now POD#2 left 5th ray amputation.  Op Note indicated liquefactive necrosis.  Wound probed proximally along the flexor tendon sheath. Pre-operative wound cultures show E Coli.  Surgical cultures/clean margin swab showing E Coli and E. Faecalis (susceptibilties pending).   Bilateral lower extremity arterial US showed hemodynamically significant stenoses of bilateral anterior tibial artery. Vascular Surgery has evaluated and is planning LLE angiogram on 2/17     Currently on IV vancomycin, cefepime, oral flagyl.     Afebrile.  No leukocytosis. Stable and non-septic appearing.        Plan/recommendations:  1.  Continue IV vancomycin for now pending E faecalis sensitivities.    Pharmacy to dose.  Maintain trough 15-20  2.  Continue cefepime 2 grams IV q 8 hours and metronidazole 500 mg orally q 8 hours for now.  Will likely de-escalate tomorrow once sensitivities of E Coli and E faecalis from surgical cultures are available  3.  Anticipate long term antibiotics - 6 weeks.   4.  Please give Tdap  5.  Will follow up tomorrow.         Thank you.    Please call for any questions or concerns.  PABLITO Kauffman, ANP-C  [phone] pager, [place] [phone]  Subjective:     Principal Problem:Diabetic foot infection    HPI: Ms. Ana Maria Souza is a 69 year old woman with DM who presented to ED with a foul smelling left foot wound.  Per initial HPI, she stepped on a nail approximately 3 months prior to admission but did not seek medical care.  She was using OTC creams (silver sulfadiazine and ammonia acetate) but wound was not healing.  Developed an eschar which she pulled off and noticed a white purulent material with foul odor.  Subsequently developed erythema and swelling of left leg.  Did not go to the ED as she was waiting for Medicaid to be finalized.  Afebrile, no leukocytosis on arrival to ED.      Imaging:    X-ray left foot - showed osseous erosion of the distal 5th metatarsal head and proximal and middle phalanx of the 5th digit and proximal phalanx of the 4th digit.    MRI also concerning for osteomyelitis of left 4th and 5th toes.  Soft tissue gas noted.     Podiatry has evaluated and plan is for surgical I&D and possible 5th toe amputation today.     Interval History:  POD#2 left 5th ray amputation.  Wound vac in place.   Clean margin swabs showing E coli and e Faecalis (susceptibilities pending).   No complaints.     Review of Systems   Constitutional: Negative for activity change, appetite change, chills, diaphoresis, fatigue and fever.   HENT: Negative.    Eyes: Negative.    Respiratory: Negative for cough, shortness of breath and wheezing.    Cardiovascular: Positive for leg swelling. Negative for chest pain and palpitations.   Gastrointestinal: Negative for abdominal pain, constipation, diarrhea, nausea and vomiting.   Genitourinary: Negative for difficulty urinating, dysuria and flank pain.   Musculoskeletal: Positive for joint swelling. Negative for arthralgias, back pain and neck pain.   Skin: Positive for color change and wound. Negative for rash.    Neurological: Negative for dizziness, weakness, light-headedness and headaches.   Hematological: Negative for adenopathy.   Psychiatric/Behavioral: Negative for agitation, behavioral problems and confusion.     Objective:     Vital Signs (Most Recent):  Temp: 98.2 °F (36.8 °C) (02/15/20 1557)  Pulse: 63 (02/15/20 1557)  Resp: 18 (02/15/20 1557)  BP: (!) 162/78 (02/15/20 1557)  SpO2: 98 % (02/15/20 1557) Vital Signs (24h Range):  Temp:  [96.1 °F (35.6 °C)-98.2 °F (36.8 °C)] 98.2 °F (36.8 °C)  Pulse:  [60-87] 63  Resp:  [16-20] 18  SpO2:  [93 %-98 %] 98 %  BP: (137-172)/(62-78) 162/78     Weight: 79.4 kg (175 lb)  Body mass index is 28.25 kg/m².    Estimated Creatinine Clearance: 69.5 mL/min (based on SCr of 0.8 mg/dL).    Physical Exam   Constitutional: She is oriented to person, place, and time. She appears well-developed and well-nourished. No distress.   HENT:   Head: Normocephalic and atraumatic.   Eyes: Conjunctivae are normal. No scleral icterus.   Neck: Normal range of motion.   Cardiovascular: Normal rate and regular rhythm.   No murmur heard.  Pulmonary/Chest: Effort normal and breath sounds normal. No respiratory distress. She has no wheezes. She has no rales.   Abdominal: Soft.   Musculoskeletal: She exhibits edema. She exhibits no tenderness.   Left foot surgical dressing in place with wound vac  Podiatry photos reviewed. See below   Neurological: She is alert and oriented to person, place, and time.   Skin: Skin is warm and dry. She is not diaphoretic.   Psychiatric: She has a normal mood and affect. Her behavior is normal.   Vitals reviewed.          Significant Labs:   Blood Culture:   Recent Labs   Lab 02/12/20  1743 02/12/20  1918   LABBLOO No Growth to date  No Growth to date  No Growth to date No Growth to date  No Growth to date  No Growth to date     CBC:   Recent Labs   Lab 02/14/20  0354 02/15/20  0447   WBC 7.97 9.15   HGB 11.1* 9.9*   HCT 34.4* 31.5*    218     CMP:   Recent Labs    Lab 02/14/20  0354 02/15/20  0447    140   K 4.2 3.7    105   CO2 26 30*   * 170*   BUN 8 12   CREATININE 0.8 0.8   CALCIUM 9.4 8.9   PROT 7.4 6.6   ALBUMIN 3.2* 2.7*   BILITOT 0.4 0.3   ALKPHOS 79 70   AST 16 11   ALT 11 8*   ANIONGAP 8 5*   EGFRNONAA >60.0 >60.0     Wound Culture:   Recent Labs   Lab 02/13/20  1030 02/13/20 2046   LABAERO ESCHERICHIA COLI  Many  No other significant isolate  * ESCHERICHIA COLI  Few  Susceptibility pending  *  ENTEROCOCCUS FAECALIS  Moderate  Susceptibility pending  No other significant isolate  *       Significant Imaging: I have reviewed all pertinent imaging results/findings within the past 24 hours.

## 2020-02-16 NOTE — NURSING
"Spoke with dr. Gauthier and update given pt refusing prn hydralazine and stating " I don't need a angiogram done." Pt education provided. Condition stable. Call bell in reach. Will continue to monitor.  "

## 2020-02-16 NOTE — PROGRESS NOTES
[facility]  Vascular Surgery  Progress Note    Patient Name: Ana Maria Souza  MRN: [id number]  Admission Date: 2/12/2020  Primary Care Provider: Primary Doctor No    Subjective:     Interval History: NAEON, afebrile, HDS, upon discussing angiogram this morning pt is refusing the intervention.    Post-Op Info:  Procedure(s) (LRB):  INCISION AND DRAINAGE, FOOT (Left)   3 Days Post-Op       Medications:  Continuous Infusions:  Scheduled Meds:   ceFEPime (MAXIPIME) IVPB  2 g Intravenous Q8H    enoxaparin  40 mg Subcutaneous Daily    lisinopril  10 mg Oral Daily    metroNIDAZOLE  500 mg Oral TID    senna-docusate 8.6-50 mg  1 tablet Oral Daily    sodium chloride 0.9%  10 mL Intravenous Q6H    vancomycin (VANCOCIN) IVPB  1,000 mg Intravenous Q12H     PRN Meds:acetaminophen, bisacodyL, Dextrose 10% Bolus, Dextrose 10% Bolus, Dextrose 10% Bolus, Dextrose 10% Bolus, glucagon (human recombinant), glucose, glucose, hydrALAZINE, HYDROcodone-acetaminophen, HYDROcodone-acetaminophen, insulin aspart U-100, melatonin, ondansetron, promethazine (PHENERGAN) IVPB, senna-docusate 8.6-50 mg, Flushing PICC Protocol **AND** sodium chloride 0.9% **AND** sodium chloride 0.9%, sodium chloride 0.9%, DIPH,PERTUS (ADACEL),TETANUS PF VAC (ADULT), Pharmacy to dose Vancomycin consult **AND** vancomycin - pharmacy to dose     Objective:     Vital Signs (Most Recent):  Temp: 98.4 °F (36.9 °C) (02/16/20 0757)  Pulse: 81 (02/16/20 0757)  Resp: 18 (02/16/20 0757)  BP: 137/65 (02/16/20 0757)  SpO2: 99 % (02/16/20 0757) Vital Signs (24h Range):  Temp:  [98.2 °F (36.8 °C)-99.2 °F (37.3 °C)] 98.4 °F (36.9 °C)  Pulse:  [63-81] 81  Resp:  [17-18] 18  SpO2:  [95 %-99 %] 99 %  BP: (137-172)/(65-78) 137/65         Physical Exam   Constitutional: She is oriented to person, place, and time. She appears well-developed and well-nourished.   Cardiovascular: Normal rate and regular rhythm.   Pulmonary/Chest: Effort normal. No respiratory  distress.   Abdominal: Soft. She exhibits no distension.   Musculoskeletal: Normal range of motion.   LLE with biphasic pedal signals  RLE with biphasic PT, no DP  Left foot with wound vac in place, ankle is same warmth as right, appropriately tender  No tenderness in calf  Motor and sensation intact  Also has small ulcer at base of left 1st tos   Neurological: She is alert and oriented to person, place, and time.   Skin: Skin is warm and dry.   Psychiatric: She has a normal mood and affect. Her behavior is normal. Judgment and thought content normal.   Nursing note and vitals reviewed.      Significant Labs:  CBC:   Recent Labs   Lab 02/16/20  0356   WBC 7.24   RBC 3.53*   HGB 10.6*   HCT 34.5*      MCV 98   MCH 30.0   MCHC 30.7*     CMP:   Recent Labs   Lab 02/16/20 0356   *   CALCIUM 9.3   ALBUMIN 2.9*   PROT 6.9      K 4.2   CO2 25      BUN 8   CREATININE 0.8   ALKPHOS 70   ALT 9*   AST 12   BILITOT 0.3       Significant Diagnostics:  I have reviewed all pertinent imaging results/findings within the past 24 hours.    Assessment/Plan:     * Diabetic foot infection  Pt is 68 yo F with abnormal arterial US likely 2/2 chronic PAD    Recommending angiogram with possible intervention for LLE PA, but patient refusing at this time. Discussed that improving her blood flow would improve her ability to heal and save her foot, but she continued to refuse.  Recommend continued ASA and statin  Recommend maximizing medical control of modifiable risk factors    Will sign off at this time. Please call if patient changes her mind.        Kelby Carrasco MD  Vascular Surgery  [facility]

## 2020-02-16 NOTE — SUBJECTIVE & OBJECTIVE
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  Wound vac in place.   Clean margin swabs showing E coli and e Faecalis (susceptibilities pending).   No complaints. More cheerful today. Worked with PT  Apparently refusing angiogram/intervention    Review of Systems   Constitutional: Negative for activity change, appetite change, chills, diaphoresis, fatigue and fever.   HENT: Negative.    Eyes: Negative.    Respiratory: Negative for cough, shortness of breath and wheezing.    Cardiovascular: Positive for leg swelling. Negative for chest pain and palpitations.   Gastrointestinal: Negative for abdominal pain, constipation, diarrhea, nausea and vomiting.   Genitourinary: Negative for difficulty urinating, dysuria and flank pain.   Musculoskeletal: Positive for joint swelling. Negative for arthralgias, back pain and neck pain.   Skin: Positive for color change and wound. Negative for rash.   Neurological: Negative for dizziness, weakness, light-headedness and headaches.   Hematological: Negative for adenopathy.   Psychiatric/Behavioral: Negative for agitation, behavioral problems and confusion.     Objective:     Vital Signs (Most Recent):  Temp: 98.4 °F (36.9 °C) (02/16/20 0757)  Pulse: 81 (02/16/20 0757)  Resp: 18 (02/16/20 0757)  BP: 137/65 (02/16/20 0757)  SpO2: 99 % (02/16/20 0757) Vital Signs (24h Range):  Temp:  [97.7 °F (36.5 °C)-99.2 °F (37.3 °C)] 98.4 °F (36.9 °C)  Pulse:  [63-81] 81  Resp:  [17-18] 18  SpO2:  [95 %-99 %] 99 %  BP: (137-172)/(65-78) 137/65     Weight: 79.4 kg (175 lb)  Body mass index is 28.25 kg/m².    Estimated Creatinine Clearance: 69.5 mL/min (based on SCr of 0.8 mg/dL).    Physical Exam   Constitutional: She is oriented to person, place, and time. She appears well-developed and well-nourished. No distress.   HENT:   Head: Normocephalic and atraumatic.   Eyes: Conjunctivae are normal. No scleral icterus.   Neck: Normal range of motion.   Cardiovascular: Normal rate and regular  rhythm.   No murmur heard.  Pulmonary/Chest: Effort normal and breath sounds normal. No respiratory distress. She has no wheezes. She has no rales.   Abdominal: Soft.   Musculoskeletal: She exhibits edema. She exhibits no tenderness.   Left foot surgical dressing in place with wound vac and in gianna shoe  Podiatry photos reviewed. See below   Neurological: She is alert and oriented to person, place, and time.   Skin: Skin is warm and dry. She is not diaphoretic.   Psychiatric: She has a normal mood and affect. Her behavior is normal.   Vitals reviewed.          Significant Labs:   Blood Culture:   Recent Labs   Lab 02/12/20  1743 02/12/20  1918   LABBLOO No Growth to date  No Growth to date  No Growth to date  No Growth to date No Growth to date  No Growth to date  No Growth to date  No Growth to date     CBC:   Recent Labs   Lab 02/15/20  0447 02/16/20  0356   WBC 9.15 7.24   HGB 9.9* 10.6*   HCT 31.5* 34.5*    201     CMP:   Recent Labs   Lab 02/15/20  0447 02/16/20  0356    140   K 3.7 4.2    106   CO2 30* 25   * 160*   BUN 12 8   CREATININE 0.8 0.8   CALCIUM 8.9 9.3   PROT 6.6 6.9   ALBUMIN 2.7* 2.9*   BILITOT 0.3 0.3   ALKPHOS 70 70   AST 11 12   ALT 8* 9*   ANIONGAP 5* 9   EGFRNONAA >60.0 >60.0     Wound Culture:   Recent Labs   Lab 02/13/20  1030 02/13/20 2046   LABAERO ESCHERICHIA COLI  Many  No other significant isolate  * ESCHERICHIA COLI  Few  Susceptibility pending  *  ENTEROCOCCUS FAECALIS  Moderate  Susceptibility pending  No other significant isolate  *       Significant Imaging: I have reviewed all pertinent imaging results/findings within the past 24 hours.

## 2020-02-16 NOTE — PLAN OF CARE
Problem: Physical Therapy Goal  Goal: Physical Therapy Goal  Description  Goals to be met by: 20     Patient will increase functional independence with mobility by performin. Sit to stand transfer with Modified [place].  2. Bed to chair transfer with Modified [place] using Quad Cane.  3. Gait  x 100 feet with CGA using Quad Cane.   4. Ascend/descend 2 stair with bilateral Handrails Contact Guard Assistance using Quad Cane.   5. Lower extremity exercise program x 20 reps per handout, with assistance as needed.     Outcome: Ongoing, Progressing.   Functional mobility is limited only due to L LE orthopedic restriction.

## 2020-02-16 NOTE — SUBJECTIVE & OBJECTIVE
Medications:  Continuous Infusions:  Scheduled Meds:   ceFEPime (MAXIPIME) IVPB  2 g Intravenous Q8H    enoxaparin  40 mg Subcutaneous Daily    lisinopril  10 mg Oral Daily    metroNIDAZOLE  500 mg Oral TID    senna-docusate 8.6-50 mg  1 tablet Oral Daily    sodium chloride 0.9%  10 mL Intravenous Q6H    vancomycin (VANCOCIN) IVPB  1,000 mg Intravenous Q12H     PRN Meds:acetaminophen, bisacodyL, Dextrose 10% Bolus, Dextrose 10% Bolus, Dextrose 10% Bolus, Dextrose 10% Bolus, glucagon (human recombinant), glucose, glucose, hydrALAZINE, HYDROcodone-acetaminophen, HYDROcodone-acetaminophen, insulin aspart U-100, melatonin, ondansetron, promethazine (PHENERGAN) IVPB, senna-docusate 8.6-50 mg, Flushing PICC Protocol **AND** sodium chloride 0.9% **AND** sodium chloride 0.9%, sodium chloride 0.9%, DIPH,PERTUS (ADACEL),TETANUS PF VAC (ADULT), Pharmacy to dose Vancomycin consult **AND** vancomycin - pharmacy to dose     Objective:     Vital Signs (Most Recent):  Temp: 98.4 °F (36.9 °C) (02/16/20 0757)  Pulse: 81 (02/16/20 0757)  Resp: 18 (02/16/20 0757)  BP: 137/65 (02/16/20 0757)  SpO2: 99 % (02/16/20 0757) Vital Signs (24h Range):  Temp:  [98.2 °F (36.8 °C)-99.2 °F (37.3 °C)] 98.4 °F (36.9 °C)  Pulse:  [63-81] 81  Resp:  [17-18] 18  SpO2:  [95 %-99 %] 99 %  BP: (137-172)/(65-78) 137/65         Physical Exam   Constitutional: She is oriented to person, place, and time. She appears well-developed and well-nourished.   Cardiovascular: Normal rate and regular rhythm.   Pulmonary/Chest: Effort normal. No respiratory distress.   Abdominal: Soft. She exhibits no distension.   Musculoskeletal: Normal range of motion.   LLE with biphasic pedal signals  RLE with biphasic PT, no DP  Left foot with wound vac in place, ankle is same warmth as right, appropriately tender  No tenderness in calf  Motor and sensation intact  Also has small ulcer at base of left 1st tos   Neurological: She is alert and oriented to person, place, and  time.   Skin: Skin is warm and dry.   Psychiatric: She has a normal mood and affect. Her behavior is normal. Judgment and thought content normal.   Nursing note and vitals reviewed.      Significant Labs:  CBC:   Recent Labs   Lab 02/16/20  0356   WBC 7.24   RBC 3.53*   HGB 10.6*   HCT 34.5*      MCV 98   MCH 30.0   MCHC 30.7*     CMP:   Recent Labs   Lab 02/16/20  0356   *   CALCIUM 9.3   ALBUMIN 2.9*   PROT 6.9      K 4.2   CO2 25      BUN 8   CREATININE 0.8   ALKPHOS 70   ALT 9*   AST 12   BILITOT 0.3       Significant Diagnostics:  I have reviewed all pertinent imaging results/findings within the past 24 hours.

## 2020-02-16 NOTE — ASSESSMENT & PLAN NOTE
Pt is 70 yo F with abnormal arterial US likely 2/2 chronic PAD    Recommending angiogram with possible intervention for LLE PA, but patient refusing at this time. Discussed that improving her blood flow would improve her ability to heal and save her foot, but she continued to refuse.  Recommend continued ASA and statin  Recommend maximizing medical control of modifiable risk factors    Will sign off at this time. Please call if patient changes her mind.

## 2020-02-16 NOTE — PROGRESS NOTES
Pharmacokinetic Assessment Follow Up: IV Vancomycin    Vancomycin serum concentration assessment(s):  Vancomycin trough on 2/16 resulted at 20.3.  The trough level was drawn correctly and can be used to guide therapy at this time. The measurement is slighty above the desired definitive target range of 15 to 20 mcg/mL.    Vancomycin Regimen Plan:    Change regimen to Vancomycin 1000 mg IV every 12 hours with next serum trough concentration measured at 2300 prior to 4th dose on 2/17    Drug levels (last 3 results):  Recent Labs   Lab Result Units 02/14/20  1056 02/16/20  1001   Vancomycin-Trough ug/mL 18.1 20.3       Pharmacy will continue to follow and monitor vancomycin.    Please contact pharmacy at extension [phone] for questions regarding this assessment.    Thank you for the consult,   Marie Gamble       Patient brief summary:  Ana Maria Souza is a 69 y.o. female initiated on antimicrobial therapy with IV Vancomycin for treatment of bone/joint infection      Drug Allergies:   Review of patient's allergies indicates:  No Known Allergies    Actual Body Weight:   79.4 kg    Renal Function:   Estimated Creatinine Clearance: 69.5 mL/min (based on SCr of 0.8 mg/dL).,     Dialysis Method (if applicable):  N/A    CBC (last 72 hours):  Recent Labs   Lab Result Units 02/14/20  0354 02/15/20  0447 02/16/20  0356   WBC K/uL 7.97 9.15 7.24   Hemoglobin g/dL 11.1* 9.9* 10.6*   Hematocrit % 34.4* 31.5* 34.5*   Platelets K/uL 232 218 201   Gran% % 84.8* 51.9 50.2   Lymph% % 11.5* 34.6 33.6   Mono% % 3.1* 10.6 11.6   Eosinophil% % 0.0 2.2 3.7   Basophil% % 0.3 0.5 0.6   Differential Method  Automated Automated Automated       Metabolic Panel (last 72 hours):  Recent Labs   Lab Result Units 02/14/20  0354 02/15/20  0447 02/16/20  0356   Sodium mmol/L 137 140 140   Potassium mmol/L 4.2 3.7 4.2   Chloride mmol/L 103 105 106   CO2 mmol/L 26 30* 25   Glucose mg/dL 230* 170* 160*   BUN, Bld mg/dL 8 12 8   Creatinine mg/dL 0.8  0.8 0.8   Albumin g/dL 3.2* 2.7* 2.9*   Total Bilirubin mg/dL 0.4 0.3 0.3   Alkaline Phosphatase U/L 79 70 70   AST U/L 16 11 12   ALT U/L 11 8* 9*   Magnesium mg/dL 1.9 1.8 1.7   Phosphorus mg/dL 3.3 3.4 3.3       Vancomycin Administrations:  vancomycin given in the last 96 hours                   vancomycin 1.25 g in dextrose 5% 250 mL IVPB (ready to mix) (mg) 1,250 mg New Bag 02/15/20 2133     1,250 mg New Bag  1033     1,250 mg New Bag 02/14/20 2216     1,250 mg New Bag  1222     1,250 mg New Bag 02/13/20 2326     1,250 mg New Bag  1204                Microbiologic Results:  Microbiology Results (last 7 days)     Procedure Component Value Units Date/Time    Aerobic culture [[id number]]  (Abnormal)  (Susceptibility) Collected:  02/13/20 2046    Order Status:  Completed Specimen:  Wound from Foot, Left Updated:  02/16/20 1026     Aerobic Bacterial Culture ESCHERICHIA COLI  Few        ENTEROCOCCUS FAECALIS  Moderate  Susceptibility pending  No other significant isolate      Narrative:       Left foot wound    Blood culture x two cultures. Draw prior to antibiotics [[id number]] Collected:  02/12/20 1918    Order Status:  Completed Specimen:  Blood from Peripheral, Hand, Right Updated:  02/15/20 2212     Blood Culture, Routine No Growth to date      No Growth to date      No Growth to date      No Growth to date    Narrative:       Aerobic and anaerobic    Blood culture x two cultures. Draw prior to antibiotics [[id number]] Collected:  02/12/20 1743    Order Status:  Completed Specimen:  Blood from Peripheral, Antecubital, Left Updated:  02/15/20 2012     Blood Culture, Routine No Growth to date      No Growth to date      No Growth to date      No Growth to date    Narrative:       Aerobic and anaerobic    Aerobic culture [[id number]]  (Abnormal)  (Susceptibility) Collected:  02/13/20 1030    Order Status:  Completed Specimen:  Wound from Foot, Left Updated:  02/15/20 1156     Aerobic Bacterial Culture ESCHERICHIA  COLI  Many  No other significant isolate      AFB Culture & Smear [[id number]] Collected:  02/13/20 2046    Order Status:  Completed Specimen:  Wound from Foot, Left Updated:  02/15/20 0927     AFB Culture & Smear Culture in progress     AFB CULTURE STAIN No acid fast bacilli seen.    AFB Culture & Smear [[id number]] Collected:  02/13/20 2046    Order Status:  Completed Specimen:  Wound from Foot, Left Updated:  02/15/20 0927     AFB Culture & Smear Culture in progress     AFB CULTURE STAIN No acid fast bacilli seen.    Narrative:       Left foot wound    Culture, Anaerobe [[id number]] Collected:  02/13/20 1030    Order Status:  Completed Specimen:  Wound from Foot, Left Updated:  02/14/20 0715     Anaerobic Culture Culture in progress    Gram stain [[id number]] Collected:  02/13/20 2046    Order Status:  Completed Specimen:  Wound from Foot, Left Updated:  02/13/20 2304     Gram Stain Result No WBC's      Rare Gram positive cocci    Narrative:       Left foot wound    Gram stain [[id number]] Collected:  02/13/20 2046    Order Status:  Completed Specimen:  Wound from Foot, Left Updated:  02/13/20 2256     Gram Stain Result Rare WBC's      Moderate Gram positive cocci    Fungus culture [[id number]] Collected:  02/13/20 2046    Order Status:  Sent Specimen:  Wound from Foot, Left Updated:  02/13/20 2125    Culture, Anaerobic [[id number]] Collected:  02/13/20 2046    Order Status:  Sent Specimen:  Wound from Foot, Left Updated:  02/13/20 2124    Culture, Anaerobic [[id number]] Collected:  02/13/20 2046    Order Status:  Sent Specimen:  Wound from Foot, Left Updated:  02/13/20 2114    Fungus culture [[id number]] Collected:  02/13/20 2046    Order Status:  Sent Specimen:  Wound from Foot, Left Updated:  02/13/20 2113    Aerobic culture [[id number]] Collected:  02/13/20 2046    Order Status:  Sent Specimen:  Wound from Foot, Left Updated:  02/13/20 2046    Gram stain [[id number]] Collected:  02/13/20 1030    Order Status:  Completed  Specimen:  Wound Updated:  02/13/20 2029     Gram Stain Result Moderate WBC's      Many Gram positive cocci      Many Gram negative rods    Gram stain [[id number]]     Order Status:  Completed Specimen:  Wound from Foot, Left

## 2020-02-16 NOTE — PROGRESS NOTES
[facility] Hospital Medicine  Progress Note    Patient Name: Ana Maria Souza  MRN: [id number]  Patient Class: IP- Inpatient   Admission Date: 2/12/2020  Length of Stay: 4 days  Attending Physician: Bee Jasso MD  Primary Care Provider: Primary Doctor [facility] Medicine Team: [facility] HOSP MED K Bee Jasso MD    Subjective:     Principal Problem:Diabetic foot infection    Interval History: POD 2 from podiatry I/D 2/13    Review of Systems   Constitutional: Positive for chills and fatigue. Negative for fever.   HENT: Negative for congestion and trouble swallowing.    Eyes: Negative for discharge and itching.   Respiratory: Negative for apnea, cough, chest tightness and shortness of breath.    Cardiovascular: Negative for chest pain and leg swelling.   Gastrointestinal: Negative for abdominal distention and abdominal pain.   Endocrine: Negative for cold intolerance, polydipsia and polyphagia.   Genitourinary: Negative for difficulty urinating, dysuria and flank pain.   Musculoskeletal: Negative for arthralgias and back pain.   Neurological: Negative for dizziness, syncope, facial asymmetry and light-headedness.   Hematological: Does not bruise/bleed easily.   Psychiatric/Behavioral: Negative for decreased concentration and hallucinations.     Objective:     Vital Signs (Most Recent):  Temp: 97.6 °F (36.4 °C) (02/16/20 1358)  Pulse: 89 (02/16/20 1358)  Resp: 18 (02/16/20 1358)  BP: (!) 151/73 (02/16/20 1358)  SpO2: 99 % (02/16/20 1358) Vital Signs (24h Range):  Temp:  [97.6 °F (36.4 °C)-99.2 °F (37.3 °C)] 97.6 °F (36.4 °C)  Pulse:  [70-89] 89  Resp:  [17-18] 18  SpO2:  [95 %-99 %] 99 %  BP: (137-172)/(65-78) 151/73     Weight: 79.4 kg (175 lb)  Body mass index is 28.25 kg/m².    Intake/Output Summary (Last 24 hours) at 2/16/2020 1716  Last data filed at 2/16/2020 0500  Gross per 24 hour   Intake 240 ml   Output 0 ml   Net 240 ml      Constitutional: Appears well developed and  well nourished.  Foul smelling odor extending into the hallway  Head: Normocephalic and atraumatic.   Mouth/Throat: Oropharynx is clear and moist.   Eyes: EOM are normal. Pupils are equal, round, and reactive to light. No scleral icterus.   Neck: Normal range of motion. Neck supple.   Cardiovascular: Normal heart rate.  Regular heart rhythm.  No murmur heard.  Pulmonary/Chest: Effort normal. No respiratory distress. No wheezes, rales, or rhonchi  Abdominal: Soft. Bowel sounds are normal.  No distension.  No tenderness  Musculoskeletal: Normal range of motion. Left foot wrapped with foul smelling odor  Neurological: Alert and oriented to person, place, and time.   Skin: Skin is warm and dry.   Psychiatric: Normal mood and affect. Behavior is normal.          Overview/Hospital Course:   History of Present Illness:     Ms. Ana Maria Souza is a 69 y.o. female with T2DM not on medication, who presents to the ED for evaluation of a foul smelling left foot wound.  She mentons that about 3 months prior to admission, she stepped on a nail.  She didn't seek medical care at that time, but she developed a small found on the dorsum of her left foot.  She started to use OTC creams about 2 weeks ago when she saw that the wound was not healing and seemed to be opening more.  She was using Silver Sulfadiazine and Ammonia Acetate creams, and the wound began to harden with a thick eschar over the next few days.  She then pulled off the eschar, and noticed white purulent material was produced along with a foul smelling odor.  Over time, she has noticed some swelling and erythema extending to her left leg.  She decided to wait and go to the ER once her Medicaid was finalized.  She denies any fevers or chills.  She denies any difficulty with ambulation.  She has been taking Tylenol and Tylenol Extra Strength for pain.     Past Medical History: Patient has no past medical history on file.        Past Surgical History: Patient has a  past surgical history that includes Tympanoplasty.        Social History: Patient reports that she has never smoked. She has never used smokeless tobacco. She reports that she does not use drugs.        Family History: Patient's family history is not on file.     Significant Labs:   A1C:   Recent Labs   Lab 02/12/20  1928   HGBA1C 7.6*     CBC:   Recent Labs   Lab 02/15/20  0447 02/16/20  0356   WBC 9.15 7.24   HGB 9.9* 10.6*   HCT 31.5* 34.5*    201       Significant Imaging: I have reviewed all pertinent imaging results/findings within the past 24 hours.  MRI with gas and abscess of foot. Left side    Assessment/Plan:      Active Diagnoses:    Diagnosis Date Noted POA    PRINCIPAL PROBLEM:  Diabetic foot infection [E11.628, L08.9] 02/12/2020 Yes    Acute osteomyelitis of toe of left foot [M86.172] 02/13/2020 Unknown    Chronic pain of left ankle [M25.572, G89.29] 02/13/2020 Unknown    Type 2 diabetes mellitus, without long-term current use of insulin [E11.9] 02/12/2020 Yes    Essential hypertension [I10] 02/12/2020 Yes             Diabetic Foot Wound  · Afebrile and without leukocytosis.  ESR 88, CRP 7  · HbA1c 7.6  · MRI with left forefoot with associated heterogeneous region of central non-enhancement concerning for abscess or necrotic tissue with dimensions as above.  There are scattered foci of susceptibility artifact throughout this region concerning for soft tissue gas. Abnormal marrow edema and enhancement involving the 5th metatarsal and phalanges as well as the proximal and middle phalanges of the 4th toe concerning for associated osteomyelitis.  · Podiatry consulted, appreciate assistance. I/D on 2/13.   · ID consulted, appreciate assistance.  ESCHERICHIA COLI and ENTEROCOCCUS FAECALIS  Pending suseptibilities.   · Wound vac in place, darco boot onm, emphasized heel walking to patient  · Walker ordered   · Needs vascular evaluation but patient currently refuses.      Essential  Hypertension  · Chronic issue but not on medication at home  · Start Lisinopril 10mg.  Titrate up as needed     Type 2 DM without Long Term Insulin Use  · HbA1c 7.6  · Not on medication at home  · SSI with POCT accuchecks and Diabetic diet        Diet:  diabetic   VTE PPx:  Ambulatory  Goals of Care:  Full       Disposition:  Pending Podiatry and ID recs but most likely will need SNF/NH placement for long term ABX as patient does not believe she can do this at home.     Discharge Needs:  Patient will need placement for IV abx and possible wound vac as she cannot administer herself at home and has little social support.     Bee Jasso MD  Department of Hospital Medicine   [facility]

## 2020-02-16 NOTE — PT/OT/SLP PROGRESS
Physical Therapy Treatment    Patient Name:  Ana Maria Souza   MRN:  [id number]    Recommendations:     Discharge Recommendations:  nursing facility, skilled   Discharge Equipment Recommendations: (TBD)   Barriers to discharge: Inaccessible home and Decreased caregiver support    Assessment:     Ana Maria Souza is a 69 y.o. female admitted with a medical diagnosis of Diabetic foot infection.  She presents with the following impairments/functional limitations:  weakness, impaired endurance, impaired self care skills, impaired functional mobilty, gait instability, decreased safety awareness, orthopedic precautions, decreased ROM, decreased lower extremity function . Patient showed good participation, but appeared anxious and needs cues for safety. Patient showed no difficulty transferring and showed good awareness of L LE orthopedic restrictions.    Rehab Prognosis: Good; patient would benefit from acute skilled PT services to address these deficits and reach maximum level of function.    Recent Surgery: Procedure(s) (LRB):  INCISION AND DRAINAGE, FOOT (Left) 3 Days Post-Op    Plan:     During this hospitalization, patient to be seen daily to address the identified rehab impairments via gait training, therapeutic activities, therapeutic exercises, neuromuscular re-education and progress toward the following goals:    · Plan of Care Expires:  03/14/20    Subjective     Chief Complaint: boredom only.  Patient/Family Comments/goals: to heal well and to get stronger.  Pain/Comfort:  · Pain Rating 1: 0/10  · Location - Side 1: Left  · Location - Orientation 1: generalized  · Location 1: ankle  · Pain Addressed 1: Pre-medicate for activity  · Pain Rating Post-Intervention 1: 0/10      Objective:     Communicated with nsg prior to session.  Patient found with bed in chair position with wound vac, telemetry, SCD upon PT entry to room.     General Precautions: Standard, fall, diabetic   Orthopedic Precautions:(L LE Heel  weight bearing, allowed to ambulate for transfers to bedside chair only.)   Braces: N/A     Functional Mobility:  · Bed Mobility:     · Scooting: supervision  · Supine to Sit: supervision  · Transfers:     · Sit to Stand:  contact guard assistance with quad cane  · Gait: 4 side steps from bed<>bedside chair with Quad cane and CGA only, with L LE Heel weight bearing.      AM-PAC 6 CLICK MOBILITY  Turning over in bed (including adjusting bedclothes, sheets and blankets)?: 4  Sitting down on and standing up from a chair with arms (e.g., wheelchair, bedside commode, etc.): 3  Moving from lying on back to sitting on the side of the bed?: 4  Moving to and from a bed to a chair (including a wheelchair)?: 3  Need to walk in hospital room?: 3  Climbing 3-5 steps with a railing?: 1  Basic Mobility Total Score: 18       Therapeutic Activities and Exercises:   General education on safety provided. There ex in sitting: LAQ, HIP FELX AND AP 2X15 REPS EACH.    Patient left up in chair with all lines intact and call button in reach..    GOALS:   Multidisciplinary Problems     Physical Therapy Goals        Problem: Physical Therapy Goal    Goal Priority Disciplines Outcome Goal Variances Interventions   Physical Therapy Goal     PT, PT/OT Ongoing, Progressing     Description:  Goals to be met by: 20     Patient will increase functional independence with mobility by performin. Sit to stand transfer with Modified [place].  2. Bed to chair transfer with Modified [place] using Quad Cane.  3. Gait  x 100 feet with CGA using Quad Cane.   4. Ascend/descend 2 stair with bilateral Handrails Contact Guard Assistance using Quad Cane.   5. Lower extremity exercise program x 20 reps per handout, with assistance as needed.                      Time Tracking:     PT Received On: 20  PT Start Time: 1117     PT Stop Time: 1140  PT Total Time (min): 23 min     Billable Minutes: Therapeutic Activity 13 and Therapeutic  Exercise 10    Treatment Type: Treatment  PT/PTA: PTA     PTA Visit Number: 1     Cruz Dickinson, RYAN  02/16/2020

## 2020-02-16 NOTE — ASSESSMENT & PLAN NOTE
69 year old woman with DM (HA1C 7.6) who presented to ED with a foul smelling left foot wound that has been present since she stepped on nail approx 3 months ago.  Did not seek medical care and has been treating herself with local OTC creams.  Developed an eschar which she pulled off and noticed a white purulent material with foul odor.  Subsequently developed erythema and swelling of left leg.  Afebrile, no leukocytosis on arrival to ED.  Imaging concerning for osteomyelitis of left 4th and 5th toes and soft tissue gas.       Now POD#3 left 5th ray amputation.  Op Note indicated liquefactive necrosis.  Wound probed proximally along the flexor tendon sheath. Pre-operative wound cultures show E Coli.  Surgical cultures/clean margin swab showing E Coli (ceftriaxone sensitive) and E. Faecalis (susceptibilties pending).   Bilateral lower extremity arterial US showed hemodynamically significant stenoses of bilateral anterior tibial artery. Vascular Surgery has evaluated and recommended angiogram and possible intervention, but patient refused.       Currently on IV vancomycin, cefepime, oral flagyl.  Afebrile.  No leukocytosis. Stable and non-septic appearing.        Plan/recommendations:  1.  Continue IV vancomycin for now pending E faecalis sensitivities.    Pharmacy to dose.  Maintain trough 15-20  2.  Discontinue cefepime and start IV ceftriaxone 2 grams q 24 hours.    3.  Continue flagyl for now.    4.  Anticipate long term antibiotics - 6 weeks.   5.  Please give Tdap  6.  Will follow up tomorrow with final recommendations (waiting for E faecalis susceptibilities)    Data reviewed and plan discussed with ID staff

## 2020-02-17 LAB
ALBUMIN SERPL BCP-MCNC: 2.9 G/DL (ref 3.5–5.2)
ALP SERPL-CCNC: 73 U/L (ref 55–135)
ALT SERPL W/O P-5'-P-CCNC: 10 U/L (ref 10–44)
ANION GAP SERPL CALC-SCNC: 5 MMOL/L (ref 8–16)
AST SERPL-CCNC: 12 U/L (ref 10–40)
BACTERIA BLD CULT: NORMAL
BACTERIA BLD CULT: NORMAL
BACTERIA SPEC AEROBE CULT: ABNORMAL
BACTERIA SPEC AEROBE CULT: ABNORMAL
BACTERIA SPEC ANAEROBE CULT: NORMAL
BASOPHILS # BLD AUTO: 0.03 K/UL (ref 0–0.2)
BASOPHILS NFR BLD: 0.4 % (ref 0–1.9)
BILIRUB SERPL-MCNC: 0.3 MG/DL (ref 0.1–1)
BUN SERPL-MCNC: 9 MG/DL (ref 8–23)
CALCIUM SERPL-MCNC: 9.2 MG/DL (ref 8.7–10.5)
CHLORIDE SERPL-SCNC: 104 MMOL/L (ref 95–110)
CO2 SERPL-SCNC: 29 MMOL/L (ref 23–29)
CREAT SERPL-MCNC: 0.8 MG/DL (ref 0.5–1.4)
DIFFERENTIAL METHOD: ABNORMAL
EOSINOPHIL # BLD AUTO: 0.4 K/UL (ref 0–0.5)
EOSINOPHIL NFR BLD: 4.6 % (ref 0–8)
ERYTHROCYTE [DISTWIDTH] IN BLOOD BY AUTOMATED COUNT: 14 % (ref 11.5–14.5)
EST. GFR  (AFRICAN AMERICAN): >60 ML/MIN/1.73 M^2
EST. GFR  (NON AFRICAN AMERICAN): >60 ML/MIN/1.73 M^2
GLUCOSE SERPL-MCNC: 153 MG/DL (ref 70–110)
HCT VFR BLD AUTO: 31.3 % (ref 37–48.5)
HGB BLD-MCNC: 9.7 G/DL (ref 12–16)
IMM GRANULOCYTES # BLD AUTO: 0.03 K/UL (ref 0–0.04)
IMM GRANULOCYTES NFR BLD AUTO: 0.4 % (ref 0–0.5)
LYMPHOCYTES # BLD AUTO: 2.4 K/UL (ref 1–4.8)
LYMPHOCYTES NFR BLD: 32.2 % (ref 18–48)
MAGNESIUM SERPL-MCNC: 1.8 MG/DL (ref 1.6–2.6)
MCH RBC QN AUTO: 29.7 PG (ref 27–31)
MCHC RBC AUTO-ENTMCNC: 31 G/DL (ref 32–36)
MCV RBC AUTO: 96 FL (ref 82–98)
MONOCYTES # BLD AUTO: 0.8 K/UL (ref 0.3–1)
MONOCYTES NFR BLD: 11.1 % (ref 4–15)
NEUTROPHILS # BLD AUTO: 3.9 K/UL (ref 1.8–7.7)
NEUTROPHILS NFR BLD: 51.3 % (ref 38–73)
NRBC BLD-RTO: 0 /100 WBC
PHOSPHATE SERPL-MCNC: 3.1 MG/DL (ref 2.7–4.5)
PLATELET # BLD AUTO: 223 K/UL (ref 150–350)
PMV BLD AUTO: 10.3 FL (ref 9.2–12.9)
POCT GLUCOSE: 150 MG/DL (ref 70–110)
POCT GLUCOSE: 161 MG/DL (ref 70–110)
POCT GLUCOSE: 186 MG/DL (ref 70–110)
POCT GLUCOSE: 213 MG/DL (ref 70–110)
POTASSIUM SERPL-SCNC: 3.7 MMOL/L (ref 3.5–5.1)
PROT SERPL-MCNC: 7 G/DL (ref 6–8.4)
RBC # BLD AUTO: 3.27 M/UL (ref 4–5.4)
SODIUM SERPL-SCNC: 138 MMOL/L (ref 136–145)
WBC # BLD AUTO: 7.57 K/UL (ref 3.9–12.7)

## 2020-02-17 PROCEDURE — 85025 COMPLETE CBC W/AUTO DIFF WBC: CPT

## 2020-02-17 PROCEDURE — 84100 ASSAY OF PHOSPHORUS: CPT

## 2020-02-17 PROCEDURE — 63600175 PHARM REV CODE 636 W HCPCS: Performed by: HOSPITALIST

## 2020-02-17 PROCEDURE — A4216 STERILE WATER/SALINE, 10 ML: HCPCS | Performed by: HOSPITALIST

## 2020-02-17 PROCEDURE — 99232 PR SUBSEQUENT HOSPITAL CARE,LEVL II: ICD-10-PCS | Mod: ,,, | Performed by: HOSPITALIST

## 2020-02-17 PROCEDURE — 36415 COLL VENOUS BLD VENIPUNCTURE: CPT

## 2020-02-17 PROCEDURE — 99232 SBSQ HOSP IP/OBS MODERATE 35: CPT | Mod: ,,, | Performed by: HOSPITALIST

## 2020-02-17 PROCEDURE — 99233 PR SUBSEQUENT HOSPITAL CARE,LEVL III: ICD-10-PCS | Mod: ,,, | Performed by: PODIATRIST

## 2020-02-17 PROCEDURE — 99233 SBSQ HOSP IP/OBS HIGH 50: CPT | Mod: ,,, | Performed by: PODIATRIST

## 2020-02-17 PROCEDURE — 97110 THERAPEUTIC EXERCISES: CPT | Mod: CQ

## 2020-02-17 PROCEDURE — 80053 COMPREHEN METABOLIC PANEL: CPT

## 2020-02-17 PROCEDURE — 25000003 PHARM REV CODE 250: Performed by: HOSPITALIST

## 2020-02-17 PROCEDURE — 63600175 PHARM REV CODE 636 W HCPCS: Performed by: PHYSICIAN ASSISTANT

## 2020-02-17 PROCEDURE — 99233 SBSQ HOSP IP/OBS HIGH 50: CPT | Mod: ,,, | Performed by: NURSE PRACTITIONER

## 2020-02-17 PROCEDURE — 63600175 PHARM REV CODE 636 W HCPCS: Performed by: NURSE PRACTITIONER

## 2020-02-17 PROCEDURE — 11000001 HC ACUTE MED/SURG PRIVATE ROOM

## 2020-02-17 PROCEDURE — 25000003 PHARM REV CODE 250: Performed by: INTERNAL MEDICINE

## 2020-02-17 PROCEDURE — 97530 THERAPEUTIC ACTIVITIES: CPT | Mod: CQ

## 2020-02-17 PROCEDURE — 99233 PR SUBSEQUENT HOSPITAL CARE,LEVL III: ICD-10-PCS | Mod: ,,, | Performed by: NURSE PRACTITIONER

## 2020-02-17 PROCEDURE — 83735 ASSAY OF MAGNESIUM: CPT

## 2020-02-17 RX ADMIN — Medication 10 ML: at 12:02

## 2020-02-17 RX ADMIN — METRONIDAZOLE 500 MG: 500 TABLET ORAL at 02:02

## 2020-02-17 RX ADMIN — METRONIDAZOLE 500 MG: 500 TABLET ORAL at 09:02

## 2020-02-17 RX ADMIN — VANCOMYCIN HYDROCHLORIDE 1000 MG: 1 INJECTION, POWDER, LYOPHILIZED, FOR SOLUTION INTRAVENOUS at 12:02

## 2020-02-17 RX ADMIN — PIPERACILLIN AND TAZOBACTAM 4.5 G: 4; .5 INJECTION, POWDER, LYOPHILIZED, FOR SOLUTION INTRAVENOUS; PARENTERAL at 06:02

## 2020-02-17 RX ADMIN — DOCUSATE SODIUM - SENNOSIDES 1 TABLET: 50; 8.6 TABLET, FILM COATED ORAL at 09:02

## 2020-02-17 RX ADMIN — Medication 10 ML: at 06:02

## 2020-02-17 RX ADMIN — ACETAMINOPHEN 650 MG: 325 TABLET ORAL at 02:02

## 2020-02-17 RX ADMIN — ENOXAPARIN SODIUM 40 MG: 100 INJECTION SUBCUTANEOUS at 04:02

## 2020-02-17 RX ADMIN — INSULIN ASPART 1 UNITS: 100 INJECTION, SOLUTION INTRAVENOUS; SUBCUTANEOUS at 11:02

## 2020-02-17 NOTE — PLAN OF CARE
02/17/20 1222   Post-Acute Status   Post-Acute Authorization Placement   Post-Acute Placement Status Patient List Provided   Patient provided with SNF list of providers in network with patient payor. Patient to review list. SW/CM to follow up with patient to obtain choices to send referrals to. SW will continue following patient for discharge needs. SW in communication with CM and patient care team.   Keysha Garcia, TRACE  [facility]

## 2020-02-17 NOTE — PLAN OF CARE
Problem: Adult Inpatient Plan of Care  Goal: Optimal Comfort and Wellbeing  Outcome: Ongoing, Progressing     Problem: Diabetes Comorbidity  Goal: Blood Glucose Level Within Desired Range  Outcome: Ongoing, Progressing     Problem: Skin Injury Risk Increased  Goal: Skin Health and Integrity  Outcome: Ongoing, Progressing     Pt sitting up on side of bed with no distress noted.  Dressing to left foot intact with wound vac @ 125mm/hg of suction.  Small amt of drainage noted in the canister.  SCD to right lower ext. No current c/o pain or discomfort.  Picc to right upper arm with dressing clean and dry.  Boot on to left foot.  No current needs voiced at this time.  Patient is able to transfer to [facility] with stand by assistance.  Call light within reach.  Bed locked and lowered for safety.

## 2020-02-17 NOTE — ASSESSMENT & PLAN NOTE
Ana Maria Souza is a 69 y.o. 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 Peterson). Stable  changedwound vac.  Vac set to 75 mmHg continuous therapy.   Podiatry to change every 2-3 days  -Abx per ID, appreciate recs  -OR cultures cultures growing out e coli and fecalis.  -No further surgical intervention planned at this time.  Will plan on doing wound care with wound vac.  -PT/OT on board, appreciate recs.    -Vascular surgery offering angio, but patient refusing.  Had long discussion with patient.  Discussed that revascularization would aid wound healing, and without it she is at risk of further limb loss.  States she will think about it.  -Patient demonstrates poor understanding, and poor recall and poor judgement.  She has little support at home, would recommend SNF placement.  -podiatry will follow.    Patient to be WBAT to heel for short distances in orthopedic shoe    DC Instructions:  Patient is to follow up with podiatry within 10 days of discharge.  Call [phone]  to make an appointment.  Home health/facility to do dressing changes every 2-3 days as follows:  Rinse with saline, pat dry, apply wound vac on 75mmHg slow continuous therapy.  Dress with 2 rolls cast padding and flexinet.

## 2020-02-17 NOTE — PLAN OF CARE
Problem: Fall Injury Risk  Goal: Absence of Fall and Fall-Related Injury  Outcome: Ongoing, Progressing     Problem: Fall Injury Risk  Goal: Absence of Fall and Fall-Related Injury  Outcome: Ongoing, Progressing     Problem: Physical Therapy Goal  Goal: Physical Therapy Goal  Description  Goals to be met by: 20     Patient will increase functional independence with mobility by performin. Sit to stand transfer with Modified [place].  2. Bed to chair transfer with Modified [place] using Quad Cane.  3. Gait  x 100 feet with CGA using Quad Cane.   4. Ascend/descend 2 stair with bilateral Handrails Contact Guard Assistance using Quad Cane.   5. Lower extremity exercise program x 20 reps per handout, with assistance as needed.     Outcome: Ongoing, Progressing.  Patient continues to show good bed mobility and no difficulty noted with transfers, but L LE orthopedic restrictions limit functional mobility.

## 2020-02-17 NOTE — SUBJECTIVE & OBJECTIVE
Interval History:   POD#4  left 5th ray amputation.  Wound vac in place.   Clean margin swabs showing E coli (ampicillin R and zosyn S and e Faecalis (ampicillin S).   She does not want to undergo vascular intervention.   Wants to go to [facility]    Review of Systems   Constitutional: Negative for activity change, appetite change, chills, diaphoresis, fatigue and fever.   HENT: Negative.    Eyes: Negative.    Respiratory: Negative for cough, shortness of breath and wheezing.    Cardiovascular: Positive for leg swelling. Negative for chest pain and palpitations.   Gastrointestinal: Negative for abdominal pain, constipation, diarrhea, nausea and vomiting.   Genitourinary: Negative for difficulty urinating, dysuria and flank pain.   Musculoskeletal: Positive for joint swelling. Negative for arthralgias, back pain and neck pain.   Skin: Positive for color change and wound. Negative for rash.   Neurological: Negative for dizziness, weakness, light-headedness and headaches.   Hematological: Negative for adenopathy.   Psychiatric/Behavioral: Negative for agitation, behavioral problems and confusion.     Objective:     Vital Signs (Most Recent):  Temp: 98.2 °F (36.8 °C) (02/17/20 1138)  Pulse: 85 (02/17/20 1138)  Resp: 18 (02/17/20 1138)  BP: (!) 151/83 (02/17/20 1138)  SpO2: 96 % (02/17/20 1138) Vital Signs (24h Range):  Temp:  [98.1 °F (36.7 °C)-98.8 °F (37.1 °C)] 98.2 °F (36.8 °C)  Pulse:  [] 85  Resp:  [16-18] 18  SpO2:  [96 %-100 %] 96 %  BP: (127-151)/(60-83) 151/83     Weight: 79.4 kg (175 lb)  Body mass index is 28.25 kg/m².    Estimated Creatinine Clearance: 69.5 mL/min (based on SCr of 0.8 mg/dL).    Physical Exam   Constitutional: She is oriented to person, place, and time. She appears well-developed and well-nourished. No distress.   HENT:   Head: Normocephalic and atraumatic.   Eyes: Conjunctivae are normal. No scleral icterus.   Neck: Normal range of motion.   Cardiovascular: Normal rate and regular rhythm.    No murmur heard.  Pulmonary/Chest: Effort normal and breath sounds normal. No respiratory distress. She has no wheezes. She has no rales.   Abdominal: Soft.   Musculoskeletal: She exhibits edema. She exhibits no tenderness.   Left foot surgical dressing in place with wound vac and in gianna shoe  Podiatry photos reviewed. See below   Neurological: She is alert and oriented to person, place, and time.   Skin: Skin is warm and dry. She is not diaphoretic.   PICC RUE - c/d/i  Area of IV infiltration left forearm - tender, swollen   Psychiatric: She has a normal mood and affect. Her behavior is normal.   Vitals reviewed.          Significant Labs:   Blood Culture:   Recent Labs   Lab 02/12/20  1743 02/12/20  1918   LABBLOO No Growth to date  No Growth to date  No Growth to date  No Growth to date  No Growth to date No Growth to date  No Growth to date  No Growth to date  No Growth to date  No Growth to date     CBC:   Recent Labs   Lab 02/16/20  0356 02/17/20  0542   WBC 7.24 7.57   HGB 10.6* 9.7*   HCT 34.5* 31.3*    223     CMP:   Recent Labs   Lab 02/16/20  0356 02/17/20  0542    138   K 4.2 3.7    104   CO2 25 29   * 153*   BUN 8 9   CREATININE 0.8 0.8   CALCIUM 9.3 9.2   PROT 6.9 7.0   ALBUMIN 2.9* 2.9*   BILITOT 0.3 0.3   ALKPHOS 70 73   AST 12 12   ALT 9* 10   ANIONGAP 9 5*   EGFRNONAA >60.0 >60.0     Wound Culture:   Recent Labs   Lab 02/13/20  1030 02/13/20 2046   LABAERO ESCHERICHIA COLI  Many  No other significant isolate  * ESCHERICHIA COLI  Few  *  ENTEROCOCCUS FAECALIS  Moderate  No other significant isolate  *       Significant Imaging: I have reviewed all pertinent imaging results/findings within the past 24 hours.

## 2020-02-17 NOTE — PROGRESS NOTES
[facility]  Infectious Disease  Progress Note    Patient Name: Ana Maria Souza  MRN: [id number]  Admission Date: 2/12/2020  Length of Stay: 4 days  Attending Physician: Bee Jasso MD  Primary Care Provider: Primary Doctor No    Isolation Status: No active isolations  Assessment/Plan:      Acute osteomyelitis of toe of left foot     69 year old woman with DM (HA1C 7.6) who presented to ED with a foul smelling left foot wound that has been present since she stepped on nail approx 3 months ago.  Did not seek medical care and has been treating herself with local OTC creams.  Developed an eschar which she pulled off and noticed a white purulent material with foul odor.  Subsequently developed erythema and swelling of left leg.  Afebrile, no leukocytosis on arrival to ED.  Imaging concerning for osteomyelitis of left 4th and 5th toes and soft tissue gas.       Now POD#3 left 5th ray amputation.  Op Note indicated liquefactive necrosis.  Wound probed proximally along the flexor tendon sheath. Pre-operative wound cultures show E Coli.  Surgical cultures/clean margin swab showing E Coli (ceftriaxone sensitive) and E. Faecalis (susceptibilties pending).   Bilateral lower extremity arterial US showed hemodynamically significant stenoses of bilateral anterior tibial artery. Vascular Surgery has evaluated and recommended angiogram and possible intervention, but patient refused.       Currently on IV vancomycin, cefepime, oral flagyl.  Afebrile.  No leukocytosis. Stable and non-septic appearing.        Plan/recommendations:  1.  Continue IV vancomycin for now pending E faecalis sensitivities.    Pharmacy to dose.  Maintain trough 15-20  2.  Discontinue cefepime and start IV ceftriaxone 2 grams q 24 hours.    3.  Continue flagyl for now.    4.  Anticipate long term antibiotics - 6 weeks.   5.  Please give Tdap  6.  Will follow up tomorrow with final recommendations (waiting for E faecalis  susceptibilities)    Data reviewed and plan discussed with ID staff          Thank you.   Please call for any questions or concerns.  PABLITO Kauffman, ANP-C  [phone] pager, [facility] [phone]    Subjective:     Principal Problem:Diabetic foot infection    HPI: Ms. Ana Maria Souza is a 69 year old woman with DM who presented to ED with a foul smelling left foot wound.  Per initial HPI, she stepped on a nail approximately 3 months prior to admission but did not seek medical care.  She was using OTC creams (silver sulfadiazine and ammonia acetate) but wound was not healing.  Developed an eschar which she pulled off and noticed a white purulent material with foul odor.  Subsequently developed erythema and swelling of left leg.  Did not go to the ED as she was waiting for Medicaid to be finalized.  Afebrile, no leukocytosis on arrival to ED.      Imaging:    X-ray left foot - showed osseous erosion of the distal 5th metatarsal head and proximal and middle phalanx of the 5th digit and proximal phalanx of the 4th digit.    MRI also concerning for osteomyelitis of left 4th and 5th toes.  Soft tissue gas noted.     Podiatry has evaluated and plan is for surgical I&D and possible 5th toe amputation today.     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  Wound vac in place.   Clean margin swabs showing E coli and e Faecalis (susceptibilities pending).   No complaints. More cheerful today. Worked with PT  Apparently refusing angiogram/intervention    Review of Systems   Constitutional: Negative for activity change, appetite change, chills, diaphoresis, fatigue and fever.   HENT: Negative.    Eyes: Negative.    Respiratory: Negative for cough, shortness of breath and wheezing.    Cardiovascular: Positive for leg swelling. Negative for chest pain and palpitations.   Gastrointestinal: Negative for abdominal pain, constipation, diarrhea, nausea and vomiting.   Genitourinary: Negative for difficulty urinating, dysuria and  flank pain.   Musculoskeletal: Positive for joint swelling. Negative for arthralgias, back pain and neck pain.   Skin: Positive for color change and wound. Negative for rash.   Neurological: Negative for dizziness, weakness, light-headedness and headaches.   Hematological: Negative for adenopathy.   Psychiatric/Behavioral: Negative for agitation, behavioral problems and confusion.     Objective:     Vital Signs (Most Recent):  Temp: 98.4 °F (36.9 °C) (02/16/20 0757)  Pulse: 81 (02/16/20 0757)  Resp: 18 (02/16/20 0757)  BP: 137/65 (02/16/20 0757)  SpO2: 99 % (02/16/20 0757) Vital Signs (24h Range):  Temp:  [97.7 °F (36.5 °C)-99.2 °F (37.3 °C)] 98.4 °F (36.9 °C)  Pulse:  [63-81] 81  Resp:  [17-18] 18  SpO2:  [95 %-99 %] 99 %  BP: (137-172)/(65-78) 137/65     Weight: 79.4 kg (175 lb)  Body mass index is 28.25 kg/m².    Estimated Creatinine Clearance: 69.5 mL/min (based on SCr of 0.8 mg/dL).    Physical Exam   Constitutional: She is oriented to person, place, and time. She appears well-developed and well-nourished. No distress.   HENT:   Head: Normocephalic and atraumatic.   Eyes: Conjunctivae are normal. No scleral icterus.   Neck: Normal range of motion.   Cardiovascular: Normal rate and regular rhythm.   No murmur heard.  Pulmonary/Chest: Effort normal and breath sounds normal. No respiratory distress. She has no wheezes. She has no rales.   Abdominal: Soft.   Musculoskeletal: She exhibits edema. She exhibits no tenderness.   Left foot surgical dressing in place with wound vac and in gianna shoe  Podiatry photos reviewed. See below   Neurological: She is alert and oriented to person, place, and time.   Skin: Skin is warm and dry. She is not diaphoretic.   Psychiatric: She has a normal mood and affect. Her behavior is normal.   Vitals reviewed.          Significant Labs:   Blood Culture:   Recent Labs   Lab 02/12/20 1743 02/12/20 1918   LABBLOO No Growth to date  No Growth to date  No Growth to date  No Growth to  date No Growth to date  No Growth to date  No Growth to date  No Growth to date     CBC:   Recent Labs   Lab 02/15/20  0447 02/16/20  0356   WBC 9.15 7.24   HGB 9.9* 10.6*   HCT 31.5* 34.5*    201     CMP:   Recent Labs   Lab 02/15/20  0447 02/16/20  0356    140   K 3.7 4.2    106   CO2 30* 25   * 160*   BUN 12 8   CREATININE 0.8 0.8   CALCIUM 8.9 9.3   PROT 6.6 6.9   ALBUMIN 2.7* 2.9*   BILITOT 0.3 0.3   ALKPHOS 70 70   AST 11 12   ALT 8* 9*   ANIONGAP 5* 9   EGFRNONAA >60.0 >60.0     Wound Culture:   Recent Labs   Lab 02/13/20  1030 02/13/20 2046   LABAERO ESCHERICHIA COLI  Many  No other significant isolate  * ESCHERICHIA COLI  Few  Susceptibility pending  *  ENTEROCOCCUS FAECALIS  Moderate  Susceptibility pending  No other significant isolate  *       Significant Imaging: I have reviewed all pertinent imaging results/findings within the past 24 hours.

## 2020-02-17 NOTE — PT/OT/SLP PROGRESS
Physical Therapy Treatment    Patient Name:  Ana Maria Souza   MRN:  [id number]    Recommendations:     Discharge Recommendations:  nursing facility, skilled   Discharge Equipment Recommendations: (TBD, pending progress.)   Barriers to discharge: Inaccessible home and Decreased caregiver support    Assessment:     Ana Maria Souza is a 69 y.o. female admitted with a medical diagnosis of Diabetic foot infection.  She presents with the following impairments/functional limitations:  weakness, impaired endurance, impaired self care skills, impaired functional mobilty, gait instability, decreased safety awareness, decreased lower extremity function, decreased ROM, orthopedic precautions, impaired skin . patient is pleasant and shows good initiative, but safety awareness is poor and at times refuses to follow safety instructions. Patient showed improved ability to transfer into standing position.    Rehab Prognosis: Good; patient would benefit from acute skilled PT services to address these deficits and reach maximum level of function.    Recent Surgery: Procedure(s) (LRB):  INCISION AND DRAINAGE, FOOT (Left) 4 Days Post-Op    Plan:     During this hospitalization, patient to be seen daily to address the identified rehab impairments via gait training, therapeutic activities, therapeutic exercises, neuromuscular re-education and progress toward the following goals:    · Plan of Care Expires:  03/14/20    Subjective     Chief Complaint: fatigue.  Patient/Family Comments/goals: to heal well and to get stronger.  Pain/Comfort:  · Pain Rating 1: 0/10  · Location - Side 1: Left  · Location - Orientation 1: generalized  · Location 1: ankle  · Pain Addressed 1: Pre-medicate for activity  · Pain Rating Post-Intervention 1: 2/10      Objective:     Communicated with nsg prior to session.  Patient found HOB elevated with wound vac, telemetry, SCD upon PT entry to room.     General Precautions: Standard, fall, diabetic    Orthopedic Precautions:(L LE Heel Weight bearing allowed for transfers out bed only.)   Braces: N/A     Functional Mobility:  · Bed Mobility:     · Rolling Right: modified independence  · Scooting: modified independence  · Supine to Sit: modified independence  · Transfers:     · Sit to Stand:  stand by assistance with quad cane  · Bed to Chair: contact guard assistance with  quad cane  using  Stand Pivot  · Gait: 4 side steps to BSC using Quad cane and CGA, with L LE Heel-Weight bearing only.      AM-PAC 6 CLICK MOBILITY  Turning over in bed (including adjusting bedclothes, sheets and blankets)?: 4  Sitting down on and standing up from a chair with arms (e.g., wheelchair, bedside commode, etc.): 4  Moving from lying on back to sitting on the side of the bed?: 4  Moving to and from a bed to a chair (including a wheelchair)?: 3  Need to walk in hospital room?: 3  Climbing 3-5 steps with a railing?: 1  Basic Mobility Total Score: 19       Therapeutic Activities and Exercises:   Donned a second gown. Patient sat at EOB to manage medical lines and to prepare for treatment.    Patient left up in chair with all lines intact and call button in reach..    GOALS:   Multidisciplinary Problems     Physical Therapy Goals        Problem: Physical Therapy Goal    Goal Priority Disciplines Outcome Goal Variances Interventions   Physical Therapy Goal     PT, PT/OT Ongoing, Progressing     Description:  Goals to be met by: 20     Patient will increase functional independence with mobility by performin. Sit to stand transfer with Modified [place].  2. Bed to chair transfer with Modified [place] using Quad Cane.  3. Gait  x 100 feet with CGA using Quad Cane.   4. Ascend/descend 2 stair with bilateral Handrails Contact Guard Assistance using Quad Cane.   5. Lower extremity exercise program x 20 reps per handout, with assistance as needed.                      Time Tracking:     PT Received On: 20  PT Start  Time: 1326     PT Stop Time: 1350  PT Total Time (min): 24 min     Billable Minutes: Therapeutic Activity 12 and Therapeutic Exercise 12    Treatment Type: Treatment  PT/PTA: PTA     PTA Visit Number: 2     Cruz Dickinson PTA  02/17/2020

## 2020-02-17 NOTE — PROGRESS NOTES
[facility]  Podiatry  Progress Note    Patient Name: Ana Maria Souza  MRN: [id number]  Admission Date: 2/12/2020  Hospital Length of Stay: 5 days  Attending Physician: Bee Jasso MD  Primary Care Provider: Primary Doctor No   Interval Hx:  S/P left partial 5th ray amputation (DOS: 2/13/20 per Dr. Peterson).  Patient Seen at bedside for dressing change.  Patient denies F/C/N/V.  Pain controlled.  Dressings discheveld.  Patient ocmplaining she does not get good support at home.  Spoke about importance of her getting revascularized.  She states that vascular wanted to revascularize her right leg, when they have documented they talked to her about left leg revascularization.  When asked why, she said she had an angio a long time ago, and states it wasn't necessary, and doesn't want to go through an unnecessary procedure.    Scheduled Meds:   cefTRIAXone (ROCEPHIN) IVPB  2 g Intravenous Q24H    enoxaparin  40 mg Subcutaneous Daily    lisinopril  10 mg Oral Daily    metroNIDAZOLE  500 mg Oral TID    senna-docusate 8.6-50 mg  1 tablet Oral Daily    sodium chloride 0.9%  10 mL Intravenous Q6H    vancomycin (VANCOCIN) IVPB  1,000 mg Intravenous Q12H     Continuous Infusions:    PRN Meds:acetaminophen, bisacodyL, Dextrose 10% Bolus, Dextrose 10% Bolus, Dextrose 10% Bolus, Dextrose 10% Bolus, glucagon (human recombinant), glucose, glucose, hydrALAZINE, HYDROcodone-acetaminophen, HYDROcodone-acetaminophen, insulin aspart U-100, melatonin, ondansetron, promethazine (PHENERGAN) IVPB, senna-docusate 8.6-50 mg, Flushing PICC Protocol **AND** sodium chloride 0.9% **AND** sodium chloride 0.9%, sodium chloride 0.9%, DIPH,PERTUS (ADACEL),TETANUS PF VAC (ADULT), Pharmacy to dose Vancomycin consult **AND** vancomycin - pharmacy to dose    Review of patient's allergies indicates:  No Known Allergies     History reviewed. No pertinent past medical history.  Past Surgical History:   Procedure Laterality Date     INCISION AND DRAINAGE FOOT Left 2/13/2020    Procedure: INCISION AND DRAINAGE, FOOT;  Surgeon: Mau Peterson DPM;  Location: [facility] OR [address] [place] [address];  Service: Podiatry;  Laterality: Left;    TYMPANOPLASTY         Family History     None        Tobacco Use    Smoking status: Never Smoker    Smokeless tobacco: Never Used   Substance and Sexual Activity    Alcohol use: Not on file     Comment: 3 glasses of wine/ week    Drug use: Never    Sexual activity: Not on file     Review of Systems   Constitutional: Negative for chills and fever.   HENT: Negative for facial swelling and hearing loss.    Respiratory: Negative for cough and shortness of breath.    Cardiovascular: Negative for leg swelling.   Gastrointestinal: Negative for nausea and vomiting.   Musculoskeletal: Negative for arthralgias and joint swelling.   Skin: Positive for wound. Negative for rash.   Psychiatric/Behavioral: Negative for agitation and confusion.     Objective:     Vital Signs (Most Recent):  Temp: 98.2 °F (36.8 °C) (02/17/20 1138)  Pulse: 85 (02/17/20 1138)  Resp: 18 (02/17/20 1138)  BP: (!) 151/83 (02/17/20 1138)  SpO2: 96 % (02/17/20 1138) Vital Signs (24h Range):  Temp:  [97.6 °F (36.4 °C)-98.8 °F (37.1 °C)] 98.2 °F (36.8 °C)  Pulse:  [] 85  Resp:  [16-18] 18  SpO2:  [96 %-100 %] 96 %  BP: (127-151)/(60-83) 151/83     Weight: 79.4 kg (175 lb)  Body mass index is 28.25 kg/m².    Foot Exam    General  Orientation: alert and oriented to person, place, and time   Affect: appropriate       Right Foot/Ankle     Inspection and Palpation  Ecchymosis: none  Swelling: none     Neurovascular  Dorsalis pedis: 2+  Posterior tibial: 2+  Saphenous nerve sensation: diminished  Tibial nerve sensation: diminished  Superficial peroneal nerve sensation: diminished  Deep peroneal nerve sensation: diminished  Sural nerve sensation: diminished      Left Foot/Ankle      Inspection and Palpation  Ecchymosis: none    Neurovascular  Dorsalis pedis:  2+  Posterior tibial: 2+  Saphenous nerve sensation: diminished  Tibial nerve sensation: diminished  Superficial peroneal nerve sensation: diminished  Deep peroneal nerve sensation: diminished  Sural nerve sensation: diminished            Laboratory:  A1C:   Recent Labs   Lab 02/12/20 1928   HGBA1C 7.6*     CBC:   Recent Labs   Lab 02/17/20 0542   WBC 7.57   RBC 3.27*   HGB 9.7*   HCT 31.3*      MCV 96   MCH 29.7   MCHC 31.0*     CMP:   Recent Labs   Lab 02/17/20  0542   *   CALCIUM 9.2   ALBUMIN 2.9*   PROT 7.0      K 3.7   CO2 29      BUN 9   CREATININE 0.8   ALKPHOS 73   ALT 10   AST 12   BILITOT 0.3     CRP:   Recent Labs   Lab 02/12/20 1928   CRP 7.1     ESR:   Recent Labs   Lab 02/12/20 1928   SEDRATE 88*     Wound Cultures:   Recent Labs   Lab 02/13/20  1030 02/13/20 2046   LABAERO ESCHERICHIA COLI  Many  No other significant isolate  * ESCHERICHIA COLI  Few  *  ENTEROCOCCUS FAECALIS  Moderate  No other significant isolate  *     Microbiology Results (last 7 days)     Procedure Component Value Units Date/Time    Culture, Anaerobe [[id number]] Collected:  02/13/20 1030    Order Status:  Completed Specimen:  Wound from Foot, Left Updated:  02/17/20 1218     Anaerobic Culture No anaerobes isolated    Aerobic culture [[id number]]  (Abnormal)  (Susceptibility) Collected:  02/13/20 2046    Order Status:  Completed Specimen:  Wound from Foot, Left Updated:  02/17/20 1023     Aerobic Bacterial Culture ESCHERICHIA COLI  Few        ENTEROCOCCUS FAECALIS  Moderate  No other significant isolate      Narrative:       Left foot wound    Culture, Anaerobic [[id number]] Collected:  02/13/20 2046    Order Status:  Completed Specimen:  Wound from Foot, Left Updated:  02/17/20 0654     Anaerobic Culture Culture in progress    Culture, Anaerobic [[id number]] Collected:  02/13/20 2046    Order Status:  Completed Specimen:  Wound from Foot, Left Updated:  02/17/20 0654     Anaerobic Culture Culture in  progress    Narrative:       Left foot wound    Blood culture x two cultures. Draw prior to antibiotics [[id number]] Collected:  02/12/20 1918    Order Status:  Completed Specimen:  Blood from Peripheral, Hand, Right Updated:  02/16/20 2212     Blood Culture, Routine No Growth to date      No Growth to date      No Growth to date      No Growth to date      No Growth to date    Narrative:       Aerobic and anaerobic    Blood culture x two cultures. Draw prior to antibiotics [[id number]] Collected:  02/12/20 1743    Order Status:  Completed Specimen:  Blood from Peripheral, Antecubital, Left Updated:  02/16/20 2012     Blood Culture, Routine No Growth to date      No Growth to date      No Growth to date      No Growth to date      No Growth to date    Narrative:       Aerobic and anaerobic    Aerobic culture [[id number]]  (Abnormal)  (Susceptibility) Collected:  02/13/20 1030    Order Status:  Completed Specimen:  Wound from Foot, Left Updated:  02/15/20 1156     Aerobic Bacterial Culture ESCHERICHIA COLI  Many  No other significant isolate      AFB Culture & Smear [[id number]] Collected:  02/13/20 2046    Order Status:  Completed Specimen:  Wound from Foot, Left Updated:  02/15/20 0927     AFB Culture & Smear Culture in progress     AFB CULTURE STAIN No acid fast bacilli seen.    AFB Culture & Smear [[id number]] Collected:  02/13/20 2046    Order Status:  Completed Specimen:  Wound from Foot, Left Updated:  02/15/20 0927     AFB Culture & Smear Culture in progress     AFB CULTURE STAIN No acid fast bacilli seen.    Narrative:       Left foot wound    Gram stain [[id number]] Collected:  02/13/20 2046    Order Status:  Completed Specimen:  Wound from Foot, Left Updated:  02/13/20 2304     Gram Stain Result No WBC's      Rare Gram positive cocci    Narrative:       Left foot wound    Gram stain [[id number]] Collected:  02/13/20 2046    Order Status:  Completed Specimen:  Wound from Foot, Left Updated:  02/13/20 2256     Gram  Stain Result Rare WBC's      Moderate Gram positive cocci    Fungus culture [[id number]] Collected:  02/13/20 2046    Order Status:  Sent Specimen:  Wound from Foot, Left Updated:  02/13/20 2125    Fungus culture [[id number]] Collected:  02/13/20 2046    Order Status:  Sent Specimen:  Wound from Foot, Left Updated:  02/13/20 2113    Aerobic culture [[id number]] Collected:  02/13/20 2046    Order Status:  Sent Specimen:  Wound from Foot, Left Updated:  02/13/20 2046    Gram stain [[id number]] Collected:  02/13/20 1030    Order Status:  Completed Specimen:  Wound Updated:  02/13/20 2029     Gram Stain Result Moderate WBC's      Many Gram positive cocci      Many Gram negative rods    Gram stain [[id number]]     Order Status:  Completed Specimen:  Wound from Foot, Left         Specimen (12h ago, onward)    None          Diagnostic Results:  Imaging Results           MRI Foot (Forefoot) Left W W/O Contrast (Final result)  Result time 02/13/20 01:26:44    Final result by Mirza Brown MD (02/13/20 01:26:44)                 Impression:      1.  Soft tissue irregularity along the dorsum/lateral aspect of the left forefoot with associated heterogeneous region of central non-enhancement concerning for abscess or necrotic tissue with dimensions as above.  There are scattered foci of susceptibility artifact throughout this region concerning for soft tissue gas.    2.  Abnormal marrow edema and enhancement involving the 5th metatarsal and phalanges as well as the proximal and middle phalanges of the 4th toe concerning for associated osteomyelitis.    This report was flagged in Epic as abnormal.      Electronically signed by: Mirza Brown MD  Date:    02/13/2020  Time:    01:26             Narrative:    EXAMINATION:  MRI FOOT (FOREFOOT) LEFT W W/O CONTRAST    CLINICAL HISTORY:  Osteomyelitis suspected, foot swelling, diabetic;    TECHNIQUE:  Multiplanar, multisequence imaging of the left forefoot was performed before and  after the administration of 9 cc gadolinium based IV contrast.    COMPARISON:  Left foot radiograph series 02/12/2020    FINDINGS:  There is significant soft tissue irregularity/ulceration along the dorsum/lateral aspect of the forefoot.  There is a large region of nonenhancing soft tissue along the lateral aspect of the forefoot measuring approximately 2.7 x 7.6 cm concerning for abscess or necrotic tissue (series 10, image 19).  This demonstrates heterogeneous T2 hyperintensity with scattered foci of susceptibility concerning for soft tissue gas.  There is abnormal marrow edema and enhancement involving the 5th metatarsal as well as the proximal, middle, and distal phalanges concerning for osteomyelitis.  Additional abnormal marrow edema and enhancement is present of the proximal and middle for phalanges of the 4th toe concerning for osteomyelitis.    There is diffuse edema of the plantar foot musculature.  No definite additional discrete collections identified.  There are mild degenerative changes of the midfoot.                                X-Ray Foot Complete Left (Final result)  Result time 02/12/20 20:17:22    Final result by Sharad Baker MD (02/12/20 20:17:22)                 Impression:      Osseous erosion involving the distal 5th metatarsal, proximal and middle phalanx of the 5th digit and proximal phalanx of the 4th digit.  There is soft tissue thickening lateral to the 5th metatarsal with probable soft tissue air.  Findings suggest cellulitis with possible abscess and osteomyelitis.  Recommend orthopedic follow-up.    This report was flagged in Epic as abnormal.      Electronically signed by: Sharad Baker  Date:    02/12/2020  Time:    20:17             Narrative:    EXAMINATION:  XR FOOT COMPLETE 3 VIEW LEFT    CLINICAL HISTORY:  .  Unspecified infectious disease    TECHNIQUE:  AP, lateral and oblique views of the left foot were performed.    COMPARISON:  None    FINDINGS:  There is mild erosion  noted to the distal 5th metatarsal head and shaft.  Minimal rotation of the adjacent proximal portion of the proximal phalanx of the 5th digit also.  There is soft tissue edema and thickening laterally.  Probable air in the soft tissues laterally also.  Findings suggest cellulitis with possible abscess and osteomyelitis.  Recommend follow-up.    There is erosion of the midportion of the proximal phalanx of the 4th digit also.  Limited visualization.    Mild erosion of the medial margin of the middle phalanx of the 5th digit also.                               X-Ray Chest 1 View (Final result)  Result time 02/12/20 18:37:00    Final result by Ramos Rushing MD (02/12/20 18:37:00)                 Impression:      No radiographic acute intrathoracic process seen.  Specifically, no focal consolidation.      Electronically signed by: Ramos Rushing MD  Date:    02/12/2020  Time:    18:37             Narrative:    EXAMINATION:  XR CHEST 1 VIEW    CLINICAL HISTORY:  Sepsis;    TECHNIQUE:  Single frontal view of the chest was performed.    COMPARISON:  None    FINDINGS:  Mild nonspecific elevation of the left hemidiaphragm.The lungs are clear, with normal appearance of pulmonary vasculature and no pleural effusion or pneumothorax.    The cardiac silhouette is normal in size. Suspected mild tortuosity of the thoracic aorta.  Hilar contours are within normal limits.    Bones are intact.  PA and lateral views can be obtained.                                  Clinical Findings:  Ulcer Location: Left lateral dorsal foot  Measurements:  Periwound: atrophic  Drainage:serous sanguinous  Base: granular  Signs of infection: No purulence, erythema, edema, or malodor    Partial skin breakdown of left plantar hallux.    Sutures intact    2/17          2/15            Assessment/Plan:     Chronic pain of left ankle  Ankle x-ray not showing fracture, but does show arthritis, discussed that once wound is healed will consider ankle brace and  other conservative measures for ankle pain.      Acute osteomyelitis of toe of left foot   Ana Maria Souza is a 69 y.o. 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 Peterson). Stable  changedwound vac.  Vac set to 75 mmHg continuous therapy.   Podiatry to change every 2-3 days  -Abx per ID, appreciate recs  -OR cultures cultures growing out e coli and fecalis.  -No further surgical intervention planned at this time.  Will plan on doing wound care with wound vac.  -PT/OT on board, appreciate recs.    -Vascular surgery offering angio, but patient refusing.  Had long discussion with patient.  Discussed that revascularization would aid wound healing, and without it she is at risk of further limb loss.  States she will think about it.  -Patient demonstrates poor understanding, and poor recall and poor judgement.  She has little support at home, would recommend SNF placement.  -podiatry will follow.    Patient to be WBAT to heel for short distances in orthopedic shoe    DC Instructions:  Patient is to follow up with podiatry within 10 days of discharge.  Call [phone]  to make an appointment.  Home health/facility to do dressing changes every 2-3 days as follows:  Rinse with saline, pat dry, apply wound vac on 75mmHg slow continuous therapy.  Dress with 2 rolls cast padding and flexinet.            Type 2 diabetes mellitus, without long-term current use of insulin  A1c 7.6  Per primary          Elliott Mcclain MD  Podiatry  [facility]

## 2020-02-17 NOTE — PROGRESS NOTES
Therapy with vanc complete and/or consult discontinued by provider.  Pharmacy will sign off, please re-consult as needed.

## 2020-02-17 NOTE — PROGRESS NOTES
[facility] Hospital Medicine  Progress Note    Patient Name: Ana Maria Souza  MRN: [id number]  Patient Class: IP- Inpatient   Admission Date: 2/12/2020  Length of Stay: 5 days  Attending Physician: Bee Jasso MD  Primary Care Provider: Primary Doctor [facility] Medicine Team: [facility] HOSP MED K Bee Jasso MD    Subjective:     Principal Problem:Diabetic foot infection    Interval History: POD 2 from podiatry I/D 2/13. Podiatry has assess foot today with recs to follow. Wound vac in place and boot on. Patient without complaints or concerns.     Review of Systems   Constitutional: Positive for chills and fatigue. Negative for fever.   HENT: Negative for congestion and trouble swallowing.    Eyes: Negative for discharge and itching.   Respiratory: Negative for apnea, cough, chest tightness and shortness of breath.    Cardiovascular: Negative for chest pain and leg swelling.   Gastrointestinal: Negative for abdominal distention and abdominal pain.   Endocrine: Negative for cold intolerance, polydipsia and polyphagia.   Genitourinary: Negative for difficulty urinating, dysuria and flank pain.   Musculoskeletal: Negative for arthralgias and back pain.   Neurological: Negative for dizziness, syncope, facial asymmetry and light-headedness.   Hematological: Does not bruise/bleed easily.   Psychiatric/Behavioral: Negative for decreased concentration and hallucinations.     Objective:     Vital Signs (Most Recent):  Temp: 98.2 °F (36.8 °C) (02/17/20 1138)  Pulse: 85 (02/17/20 1138)  Resp: 18 (02/17/20 1138)  BP: (!) 151/83 (02/17/20 1138)  SpO2: 96 % (02/17/20 1138) Vital Signs (24h Range):  Temp:  [97.6 °F (36.4 °C)-98.8 °F (37.1 °C)] 98.2 °F (36.8 °C)  Pulse:  [] 85  Resp:  [16-18] 18  SpO2:  [96 %-100 %] 96 %  BP: (127-151)/(60-83) 151/83     Weight: 79.4 kg (175 lb)  Body mass index is 28.25 kg/m².    Intake/Output Summary (Last 24 hours) at 2/17/2020 1315  Last data filed at  2/16/2020 2225  Gross per 24 hour   Intake 750 ml   Output 1 ml   Net 749 ml      Constitutional: Appears well developed and well nourished.  Foul smelling odor extending into the hallway  Head: Normocephalic and atraumatic.   Mouth/Throat: Oropharynx is clear and moist.   Eyes: EOM are normal. Pupils are equal, round, and reactive to light. No scleral icterus.   Neck: Normal range of motion. Neck supple.   Cardiovascular: Normal heart rate.  Regular heart rhythm.  No murmur heard.  Pulmonary/Chest: Effort normal. No respiratory distress. No wheezes, rales, or rhonchi  Abdominal: Soft. Bowel sounds are normal.  No distension.  No tenderness  Musculoskeletal: Normal range of motion. Left foot wrapped with foul smelling odor  Neurological: Alert and oriented to person, place, and time.   Skin: Skin is warm and dry.   Psychiatric: Normal mood and affect. Behavior is normal.          Overview/Hospital Course:   History of Present Illness:     Ms. Ana Maria Souza is a 69 y.o. female with T2DM not on medication, who presents to the ED for evaluation of a foul smelling left foot wound.  She mentons that about 3 months prior to admission, she stepped on a nail.  She didn't seek medical care at that time, but she developed a small found on the dorsum of her left foot.  She started to use OTC creams about 2 weeks ago when she saw that the wound was not healing and seemed to be opening more.  She was using Silver Sulfadiazine and Ammonia Acetate creams, and the wound began to harden with a thick eschar over the next few days.  She then pulled off the eschar, and noticed white purulent material was produced along with a foul smelling odor.  Over time, she has noticed some swelling and erythema extending to her left leg.  She decided to wait and go to the ER once her Medicaid was finalized.  She denies any fevers or chills.  She denies any difficulty with ambulation.  She has been taking Tylenol and Tylenol Extra Strength for  pain.     Past Medical History: Patient has no past medical history on file.        Past Surgical History: Patient has a past surgical history that includes Tympanoplasty.        Social History: Patient reports that she has never smoked. She has never used smokeless tobacco. She reports that she does not use drugs.        Family History: Patient's family history is not on file.     Significant Labs:   A1C:   Recent Labs   Lab 02/12/20  1928   HGBA1C 7.6*     CBC:   Recent Labs   Lab 02/16/20  0356 02/17/20  0542   WBC 7.24 7.57   HGB 10.6* 9.7*   HCT 34.5* 31.3*    223       Significant Imaging: I have reviewed all pertinent imaging results/findings within the past 24 hours.  MRI with gas and abscess of foot. Left side    Assessment/Plan:      Active Diagnoses:    Diagnosis Date Noted POA    PRINCIPAL PROBLEM:  Diabetic foot infection [E11.628, L08.9] 02/12/2020 Yes    Acute osteomyelitis of toe of left foot [M86.172] 02/13/2020 Unknown    Chronic pain of left ankle [M25.572, G89.29] 02/13/2020 Unknown    Type 2 diabetes mellitus, without long-term current use of insulin [E11.9] 02/12/2020 Yes    Essential hypertension [I10] 02/12/2020 Yes             Diabetic Foot Wound  · Afebrile and without leukocytosis.  ESR 88, CRP 7  · HbA1c 7.6  · MRI with left forefoot with associated heterogeneous region of central non-enhancement concerning for abscess or necrotic tissue with dimensions as above.  There are scattered foci of susceptibility artifact throughout this region concerning for soft tissue gas. Abnormal marrow edema and enhancement involving the 5th metatarsal and phalanges as well as the proximal and middle phalanges of the 4th toe concerning for associated osteomyelitis.  · Podiatry consulted, appreciate assistance. I/D on 2/13.   · ID consulted, appreciate assistance.  ESCHERICHIA COLI and ENTEROCOCCUS FAECALIS  Pending suseptibilities.   · Wound vac in place, darco boot onm, emphasized heel walking  to patient  · Walker ordered   · Needs vascular evaluation but patient currently refuses. Will continue to emphasize.      Essential Hypertension  · Chronic issue but not on medication at home  · Start Lisinopril 10mg.  Titrate up as needed     Type 2 DM without Long Term Insulin Use  · HbA1c 7.6  · Not on medication at home  · SSI with POCT accuchecks and Diabetic diet        Diet:  diabetic   VTE PPx:  Ambulatory  Goals of Care:  Full       Disposition:  Pending Podiatry and ID recs but most likely will need SNF/NH placement for long term ABX as patient does not believe she can do this at home.     Discharge Needs:  Patient will need placement for IV abx and possible wound vac as she cannot administer herself at home and has little social support.     Bee Jasso MD  Department of Hospital Medicine   [facility]

## 2020-02-17 NOTE — ASSESSMENT & PLAN NOTE
69 year old woman with DM (HA1C 7.6) who presented to ED with a foul smelling left foot wound that has been present since she stepped on nail approx 3 months ago.  Did not seek medical care and has been treating herself with local OTC creams.  Developed an eschar which she pulled off and noticed a white purulent material with foul odor.  Subsequently developed erythema and swelling of left leg.  Afebrile, no leukocytosis on arrival to ED.  Imaging concerning for osteomyelitis of left 4th and 5th toes and soft tissue gas.       Now POD#4 left 5th ray amputation.  Op Note indicated liquefactive necrosis.  Wound probed proximally along the flexor tendon sheath. Pre-operative wound cultures show E Coli.  Surgical cultures/clean margin swab showing E Coli (ceftriaxone S, ampicillin R, zosyn S) and E. Faecalis (ampicillin S).   Bilateral lower extremity arterial US showed hemodynamically significant stenoses of bilateral anterior tibial artery. Vascular Surgery has evaluated and recommended angiogram and possible intervention, but patient is refusing, telling me she thinks it is an unnecessary procedure.  Long conversation with her explaining the importance of good blood flow for wound healing and to deliver the antibiotics to treat the infection.  She says she'll think about it, but is not inclined to have this done.        Currently on IV vancomycin, cefepime, oral flagyl.  Afebrile.  No leukocytosis. Stable and non-septic appearing.        Plan/recommendations:  1.  Discontinue vancomycin, ceftriaxone and flagyl, and start zosyn 4.5 grams extended infusion IV q 8 hours.    2.  Estimated duration of antibiotics - 6 weeks from date of surgery. Estimated end date: 3/26/20  3.  Weekly cbc, cmp, crp, esr and fax results to ID at [phone]  4.  ID follow up 2 -3 weeks.   ID will make the appointment.  If no appointment scheduled prior to discharge, please send Epic message to ID charge nurse Tammie Rico.   5.   Please give Tdap before discharge.    6.  Wound care per Podiatry.   7.  As noted above, long discussion with patient regarding recommended re-vascularization for wound healing.    8.  Will sign off.  Please call with questions or re-consult as needed.     Data reviewed and plan discussed with ID staff  Discussed with Primary Team

## 2020-02-17 NOTE — PLAN OF CARE
02/17/20 1535   Discharge Reassessment   Assessment Type Discharge Planning Reassessment   Provided patient/caregiver education on the expected discharge date and the discharge plan Yes   Do you have any problems affording any of your prescribed medications? No   Discharge Plan A Skilled Nursing Facility   Discharge Plan B Rehab   DME Needed Upon Discharge  none   Anticipated Discharge Disposition SNF   Post-Acute Status   Post-Acute Authorization Placement   Post-Acute Placement Status Awaiting Internal Medical Clearance

## 2020-02-17 NOTE — SUBJECTIVE & OBJECTIVE
Interval Hx:  S/P left partial 5th ray amputation (DOS: 2/13/20 per Dr. Peterosn).  Patient Seen at bedside for dressing change.  Patient denies F/C/N/V.  Pain controlled.  Dressings discheveld.  Patient ocmplaining she does not get good support at home.  Spoke about importance of her getting revascularized.  She states that vascular wanted to revascularize her right leg, when they have documented they talked to her about left leg revascularization.  When asked why, she said she had an angio a long time ago, and states it wasn't necessary, and doesn't want to go through an unnecessary procedure.    Scheduled Meds:   cefTRIAXone (ROCEPHIN) IVPB  2 g Intravenous Q24H    enoxaparin  40 mg Subcutaneous Daily    lisinopril  10 mg Oral Daily    metroNIDAZOLE  500 mg Oral TID    senna-docusate 8.6-50 mg  1 tablet Oral Daily    sodium chloride 0.9%  10 mL Intravenous Q6H    vancomycin (VANCOCIN) IVPB  1,000 mg Intravenous Q12H     Continuous Infusions:    PRN Meds:acetaminophen, bisacodyL, Dextrose 10% Bolus, Dextrose 10% Bolus, Dextrose 10% Bolus, Dextrose 10% Bolus, glucagon (human recombinant), glucose, glucose, hydrALAZINE, HYDROcodone-acetaminophen, HYDROcodone-acetaminophen, insulin aspart U-100, melatonin, ondansetron, promethazine (PHENERGAN) IVPB, senna-docusate 8.6-50 mg, Fl[place] PICC Protocol **AND** sodium chloride 0.9% **AND** sodium chloride 0.9%, sodium chloride 0.9%, DIPH,PERTUS (ADACEL),TETANUS PF VAC (ADULT), Pharmacy to dose Vancomycin consult **AND** vancomycin - pharmacy to dose    Review of patient's allergies indicates:  No Known Allergies     History reviewed. No pertinent past medical history.  Past Surgical History:   Procedure Laterality Date    INCISION AND DRAINAGE FOOT Left 2/13/2020    Procedure: INCISION AND DRAINAGE, FOOT;  Surgeon: Mau Peterson DPM;  Location: [facility] OR [address] [place] [address];  Service: Podiatry;  Laterality: Left;    TYMPANOPLASTY         Family History     None        Tobacco  Use    Smoking status: Never Smoker    Smokeless tobacco: Never Used   Substance and Sexual Activity    Alcohol use: Not on file     Comment: 3 glasses of wine/ week    Drug use: Never    Sexual activity: Not on file     Review of Systems   Constitutional: Negative for chills and fever.   HENT: Negative for facial swelling and hearing loss.    Respiratory: Negative for cough and shortness of breath.    Cardiovascular: Negative for leg swelling.   Gastrointestinal: Negative for nausea and vomiting.   Musculoskeletal: Negative for arthralgias and joint swelling.   Skin: Positive for wound. Negative for rash.   Psychiatric/Behavioral: Negative for agitation and confusion.     Objective:     Vital Signs (Most Recent):  Temp: 98.2 °F (36.8 °C) (02/17/20 1138)  Pulse: 85 (02/17/20 1138)  Resp: 18 (02/17/20 1138)  BP: (!) 151/83 (02/17/20 1138)  SpO2: 96 % (02/17/20 1138) Vital Signs (24h Range):  Temp:  [97.6 °F (36.4 °C)-98.8 °F (37.1 °C)] 98.2 °F (36.8 °C)  Pulse:  [] 85  Resp:  [16-18] 18  SpO2:  [96 %-100 %] 96 %  BP: (127-151)/(60-83) 151/83     Weight: 79.4 kg (175 lb)  Body mass index is 28.25 kg/m².    Foot Exam    General  Orientation: alert and oriented to person, place, and time   Affect: appropriate       Right Foot/Ankle     Inspection and Palpation  Ecchymosis: none  Swelling: none     Neurovascular  Dorsalis pedis: 2+  Posterior tibial: 2+  Saphenous nerve sensation: diminished  Tibial nerve sensation: diminished  Superficial peroneal nerve sensation: diminished  Deep peroneal nerve sensation: diminished  Sural nerve sensation: diminished      Left Foot/Ankle      Inspection and Palpation  Ecchymosis: none    Neurovascular  Dorsalis pedis: 2+  Posterior tibial: 2+  Saphenous nerve sensation: diminished  Tibial nerve sensation: diminished  Superficial peroneal nerve sensation: diminished  Deep peroneal nerve sensation: diminished  Sural nerve sensation: diminished            Laboratory:  A1C:    Recent Labs   Lab 02/12/20 1928   HGBA1C 7.6*     CBC:   Recent Labs   Lab 02/17/20  0542   WBC 7.57   RBC 3.27*   HGB 9.7*   HCT 31.3*      MCV 96   MCH 29.7   MCHC 31.0*     CMP:   Recent Labs   Lab 02/17/20  0542   *   CALCIUM 9.2   ALBUMIN 2.9*   PROT 7.0      K 3.7   CO2 29      BUN 9   CREATININE 0.8   ALKPHOS 73   ALT 10   AST 12   BILITOT 0.3     CRP:   Recent Labs   Lab 02/12/20 1928   CRP 7.1     ESR:   Recent Labs   Lab 02/12/20 1928   SEDRATE 88*     Wound Cultures:   Recent Labs   Lab 02/13/20  1030 02/13/20 2046   LABAERO ESCHERICHIA COLI  Many  No other significant isolate  * ESCHERICHIA COLI  Few  *  ENTEROCOCCUS FAECALIS  Moderate  No other significant isolate  *     Microbiology Results (last 7 days)     Procedure Component Value Units Date/Time    Culture, Anaerobe [[id number]] Collected:  02/13/20 1030    Order Status:  Completed Specimen:  Wound from Foot, Left Updated:  02/17/20 1218     Anaerobic Culture No anaerobes isolated    Aerobic culture [[id number]]  (Abnormal)  (Susceptibility) Collected:  02/13/20 2046    Order Status:  Completed Specimen:  Wound from Foot, Left Updated:  02/17/20 1023     Aerobic Bacterial Culture ESCHERICHIA COLI  Few        ENTEROCOCCUS FAECALIS  Moderate  No other significant isolate      Narrative:       Left foot wound    Culture, Anaerobic [[id number]] Collected:  02/13/20 2046    Order Status:  Completed Specimen:  Wound from Foot, Left Updated:  02/17/20 0654     Anaerobic Culture Culture in progress    Culture, Anaerobic [[id number]] Collected:  02/13/20 2046    Order Status:  Completed Specimen:  Wound from Foot, Left Updated:  02/17/20 0654     Anaerobic Culture Culture in progress    Narrative:       Left foot wound    Blood culture x two cultures. Draw prior to antibiotics [[id number]] Collected:  02/12/20 1918    Order Status:  Completed Specimen:  Blood from Peripheral, Hand, Right Updated:  02/16/20 2212     Blood Culture,  Routine No Growth to date      No Growth to date      No Growth to date      No Growth to date      No Growth to date    Narrative:       Aerobic and anaerobic    Blood culture x two cultures. Draw prior to antibiotics [[id number]] Collected:  02/12/20 1743    Order Status:  Completed Specimen:  Blood from Peripheral, Antecubital, Left Updated:  02/16/20 2012     Blood Culture, Routine No Growth to date      No Growth to date      No Growth to date      No Growth to date      No Growth to date    Narrative:       Aerobic and anaerobic    Aerobic culture [[id number]]  (Abnormal)  (Susceptibility) Collected:  02/13/20 1030    Order Status:  Completed Specimen:  Wound from Foot, Left Updated:  02/15/20 1156     Aerobic Bacterial Culture ESCHERICHIA COLI  Many  No other significant isolate      AFB Culture & Smear [[id number]] Collected:  02/13/20 2046    Order Status:  Completed Specimen:  Wound from Foot, Left Updated:  02/15/20 0927     AFB Culture & Smear Culture in progress     AFB CULTURE STAIN No acid fast bacilli seen.    AFB Culture & Smear [[id number]] Collected:  02/13/20 2046    Order Status:  Completed Specimen:  Wound from Foot, Left Updated:  02/15/20 0927     AFB Culture & Smear Culture in progress     AFB CULTURE STAIN No acid fast bacilli seen.    Narrative:       Left foot wound    Gram stain [[id number]] Collected:  02/13/20 2046    Order Status:  Completed Specimen:  Wound from Foot, Left Updated:  02/13/20 2304     Gram Stain Result No WBC's      Rare Gram positive cocci    Narrative:       Left foot wound    Gram stain [[id number]] Collected:  02/13/20 2046    Order Status:  Completed Specimen:  Wound from Foot, Left Updated:  02/13/20 2256     Gram Stain Result Rare WBC's      Moderate Gram positive cocci    Fungus culture [[id number]] Collected:  02/13/20 2046    Order Status:  Sent Specimen:  Wound from Foot, Left Updated:  02/13/20 2125    Fungus culture [[id number]] Collected:  02/13/20 2046    Order  Status:  Sent Specimen:  Wound from Foot, Left Updated:  02/13/20 2113    Aerobic culture [[id number]] Collected:  02/13/20 2046    Order Status:  Sent Specimen:  Wound from Foot, Left Updated:  02/13/20 2046    Gram stain [[id number]] Collected:  02/13/20 1030    Order Status:  Completed Specimen:  Wound Updated:  02/13/20 2029     Gram Stain Result Moderate WBC's      Many Gram positive cocci      Many Gram negative rods    Gram stain [[id number]]     Order Status:  Completed Specimen:  Wound from Foot, Left         Specimen (12h ago, onward)    None          Diagnostic Results:  Imaging Results           MRI Foot (Forefoot) Left W W/O Contrast (Final result)  Result time 02/13/20 01:26:44    Final result by Mirza Brown MD (02/13/20 01:26:44)                 Impression:      1.  Soft tissue irregularity along the dorsum/lateral aspect of the left forefoot with associated heterogeneous region of central non-enhancement concerning for abscess or necrotic tissue with dimensions as above.  There are scattered foci of susceptibility artifact throughout this region concerning for soft tissue gas.    2.  Abnormal marrow edema and enhancement involving the 5th metatarsal and phalanges as well as the proximal and middle phalanges of the 4th toe concerning for associated osteomyelitis.    This report was flagged in Epic as abnormal.      Electronically signed by: Mirza Brown MD  Date:    02/13/2020  Time:    01:26             Narrative:    EXAMINATION:  MRI FOOT (FOREFOOT) LEFT W W/O CONTRAST    CLINICAL HISTORY:  Osteomyelitis suspected, foot swelling, diabetic;    TECHNIQUE:  Multiplanar, multisequence imaging of the left forefoot was performed before and after the administration of 9 cc gadolinium based IV contrast.    COMPARISON:  Left foot radiograph series 02/12/2020    FINDINGS:  There is significant soft tissue irregularity/ulceration along the dorsum/lateral aspect of the forefoot.  There is a large region  of nonenhancing soft tissue along the lateral aspect of the forefoot measuring approximately 2.7 x 7.6 cm concerning for abscess or necrotic tissue (series 10, image 19).  This demonstrates heterogeneous T2 hyperintensity with scattered foci of susceptibility concerning for soft tissue gas.  There is abnormal marrow edema and enhancement involving the 5th metatarsal as well as the proximal, middle, and distal phalanges concerning for osteomyelitis.  Additional abnormal marrow edema and enhancement is present of the proximal and middle for phalanges of the 4th toe concerning for osteomyelitis.    There is diffuse edema of the plantar foot musculature.  No definite additional discrete collections identified.  There are mild degenerative changes of the midfoot.                                X-Ray Foot Complete Left (Final result)  Result time 02/12/20 20:17:22    Final result by Sharad Baker MD (02/12/20 20:17:22)                 Impression:      Osseous erosion involving the distal 5th metatarsal, proximal and middle phalanx of the 5th digit and proximal phalanx of the 4th digit.  There is soft tissue thickening lateral to the 5th metatarsal with probable soft tissue air.  Findings suggest cellulitis with possible abscess and osteomyelitis.  Recommend orthopedic follow-up.    This report was flagged in Epic as abnormal.      Electronically signed by: Sharad Baker  Date:    02/12/2020  Time:    20:17             Narrative:    EXAMINATION:  XR FOOT COMPLETE 3 VIEW LEFT    CLINICAL HISTORY:  .  Unspecified infectious disease    TECHNIQUE:  AP, lateral and oblique views of the left foot were performed.    COMPARISON:  None    FINDINGS:  There is mild erosion noted to the distal 5th metatarsal head and shaft.  Minimal rotation of the adjacent proximal portion of the proximal phalanx of the 5th digit also.  There is soft tissue edema and thickening laterally.  Probable air in the soft tissues laterally also.  Findings  suggest cellulitis with possible abscess and osteomyelitis.  Recommend follow-up.    There is erosion of the midportion of the proximal phalanx of the 4th digit also.  Limited visualization.    Mild erosion of the medial margin of the middle phalanx of the 5th digit also.                               X-Ray Chest 1 View (Final result)  Result time 02/12/20 18:37:00    Final result by Ramos Rushing MD (02/12/20 18:37:00)                 Impression:      No radiographic acute intrathoracic process seen.  Specifically, no focal consolidation.      Electronically signed by: Ramos Rushing MD  Date:    02/12/2020  Time:    18:37             Narrative:    EXAMINATION:  XR CHEST 1 VIEW    CLINICAL HISTORY:  Sepsis;    TECHNIQUE:  Single frontal view of the chest was performed.    COMPARISON:  None    FINDINGS:  Mild nonspecific elevation of the left hemidiaphragm.The lungs are clear, with normal appearance of pulmonary vasculature and no pleural effusion or pneumothorax.    The cardiac silhouette is normal in size. Suspected mild tortuosity of the thoracic aorta.  Hilar contours are within normal limits.    Bones are intact.  PA and lateral views can be obtained.                                  Clinical Findings:  Ulcer Location: Left lateral dorsal foot  Measurements:  Periwound: atrophic  Drainage:serous sanguinous  Base: granular  Signs of infection: No purulence, erythema, edema, or malodor    Partial skin breakdown of left plantar hallux.    Sutures intact    2/17          2/15

## 2020-02-18 LAB
BACTERIA SPEC ANAEROBE CULT: ABNORMAL
BACTERIA SPEC ANAEROBE CULT: ABNORMAL
POCT GLUCOSE: 136 MG/DL (ref 70–110)
POCT GLUCOSE: 150 MG/DL (ref 70–110)
POCT GLUCOSE: 154 MG/DL (ref 70–110)
POCT GLUCOSE: 205 MG/DL (ref 70–110)

## 2020-02-18 PROCEDURE — 63600175 PHARM REV CODE 636 W HCPCS: Performed by: NURSE PRACTITIONER

## 2020-02-18 PROCEDURE — 63600175 PHARM REV CODE 636 W HCPCS: Performed by: HOSPITALIST

## 2020-02-18 PROCEDURE — 11000001 HC ACUTE MED/SURG PRIVATE ROOM

## 2020-02-18 PROCEDURE — A4216 STERILE WATER/SALINE, 10 ML: HCPCS | Performed by: HOSPITALIST

## 2020-02-18 PROCEDURE — 25000003 PHARM REV CODE 250: Performed by: HOSPITALIST

## 2020-02-18 PROCEDURE — 97110 THERAPEUTIC EXERCISES: CPT

## 2020-02-18 PROCEDURE — 97530 THERAPEUTIC ACTIVITIES: CPT

## 2020-02-18 PROCEDURE — 99232 SBSQ HOSP IP/OBS MODERATE 35: CPT | Mod: ,,, | Performed by: HOSPITALIST

## 2020-02-18 PROCEDURE — 99232 PR SUBSEQUENT HOSPITAL CARE,LEVL II: ICD-10-PCS | Mod: ,,, | Performed by: HOSPITALIST

## 2020-02-18 RX ADMIN — DOCUSATE SODIUM - SENNOSIDES 1 TABLET: 50; 8.6 TABLET, FILM COATED ORAL at 08:02

## 2020-02-18 RX ADMIN — Medication 10 ML: at 06:02

## 2020-02-18 RX ADMIN — PIPERACILLIN AND TAZOBACTAM 4.5 G: 4; .5 INJECTION, POWDER, LYOPHILIZED, FOR SOLUTION INTRAVENOUS; PARENTERAL at 07:02

## 2020-02-18 RX ADMIN — PIPERACILLIN AND TAZOBACTAM 4.5 G: 4; .5 INJECTION, POWDER, LYOPHILIZED, FOR SOLUTION INTRAVENOUS; PARENTERAL at 02:02

## 2020-02-18 RX ADMIN — PIPERACILLIN AND TAZOBACTAM 4.5 G: 4; .5 INJECTION, POWDER, LYOPHILIZED, FOR SOLUTION INTRAVENOUS; PARENTERAL at 11:02

## 2020-02-18 RX ADMIN — Medication 10 ML: at 12:02

## 2020-02-18 RX ADMIN — ENOXAPARIN SODIUM 40 MG: 100 INJECTION SUBCUTANEOUS at 05:02

## 2020-02-18 NOTE — PLAN OF CARE
Problem: Physical Therapy Goal  Goal: Physical Therapy Goal  Description  Goals to be met by: 20     Patient will increase functional independence with mobility by performin. Sit to stand transfer with Modified [place].  2. Bed to chair transfer with Modified [place] using Quad Cane.  3. Gait  x 100 feet with CGA using Quad Cane. - Cancelled  secondary to WB status  4. Ascend/descend 2 stair with bilateral Handrails Contact Guard Assistance using Quad Cane. - Cancelled  secondary to WB status  5. Lower extremity exercise program x 20 reps per handout, with assistance as needed.  6. Patient will ambulated 10 ft with CGA using quad cane and heel only WB.    7. Patient will propel wheelchair 50ft with SBA using Bilateral UE - updated 20 1610 by Stacy Gamez, PT  Outcome: Ongoing, Progressing    Added wheelchair goal.     Stacy Gamez, PT, DPT  2020

## 2020-02-18 NOTE — NURSING
"Nurse spoked with Dr Jasso reference with left lower arm swelling, hard, pain due to old IV site hard area and painful . Dr jasso stated, " patient moving her gown and pushing on her IV site . I did check her arm . Nurse stated, Weill apply warm compress on her arm . " Dr Jasso stated, "ok."  "

## 2020-02-18 NOTE — PT/OT/SLP PROGRESS
"Physical Therapy Treatment    Patient Name:  Ana Maria Souza   MRN:  [id number]    Recommendations:     Discharge Recommendations:  nursing facility, skilled   Discharge Equipment Recommendations: (TBD, pending progress.)   Barriers to discharge: Inaccessible home and Decreased caregiver support    Assessment:     Ana Maria Souza is a 69 y.o. female admitted with a medical diagnosis of Diabetic foot infection.  She presents with the following impairments/functional limitations:  weakness, impaired endurance, impaired self care skills, impaired functional mobilty, gait instability, impaired balance, orthopedic precautions, decreased ROM, decreased safety awareness Patient pleasant and motivated to work with therapy. Primarily limited by weightbearing status. Patient CGA for transfers with QC. Patient refused to use a walker but agreeable to WC training. Plan for wheelchair mobility next session. Patient is not safe to return home when medically ready at current level of mobility and would benefit from skilled nursing facility to improve functional mobility and safety.      Rehab Prognosis: Good; patient would benefit from acute skilled PT services to address these deficits and reach maximum level of function.    Recent Surgery: Procedure(s) (LRB):  INCISION AND DRAINAGE, FOOT (Left) 5 Days Post-Op    Plan:     During this hospitalization, patient to be seen daily to address the identified rehab impairments via gait training, therapeutic activities, therapeutic exercises, neuromuscular re-education, wheelchair management/training and progress toward the following goals:    · Plan of Care Expires:  03/14/20    Subjective     Chief Complaint: not being able to walk  Patient/Family Comments/goals: "I am 69. I can't be slowing down like this. I gotta keep moving"  Pain/Comfort:  · Pain Rating 1: 0/10  · Pain Rating Post-Intervention 1: 0/10      Objective:     Communicated with nurse prior to session.  Patient found " sitting up on couch with wound vac, telemetry, SCD upon PT entry to room.     General Precautions: Standard, fall, diabetic   Orthopedic Precautions:LLE non weight bearing(L LE Heel Weight bearing allowed for transfers out bed only)   Braces: N/A     Functional Mobility:  · Bed Mobility:     · Supine to Sit: deferred, patient UIC  · Sit to Supine: deferred, pateint UIC  · Transfers:     · Sit to Stand:  stand by assistance with quad cane and moderate v/t cues for technique and to maintain weightbearing precautions  · Bed to Chair: contact guard assistance with  quad cane  using  Stand Pivot and moderate v/t cues for technique and to maintain weightbearing precautions. X 4 trials  · Gait: 2-3 steps at EOB. Maximal v/t cues to maintian weightbearing status      AM-PAC 6 CLICK MOBILITY  Turning over in bed (including adjusting bedclothes, sheets and blankets)?: 4  Sitting down on and standing up from a chair with arms (e.g., wheelchair, bedside commode, etc.): 4  Moving from lying on back to sitting on the side of the bed?: 4  Moving to and from a bed to a chair (including a wheelchair)?: 3  Need to walk in hospital room?: 3  Climbing 3-5 steps with a railing?: 1  Basic Mobility Total Score: 19       Therapeutic Activities and Exercises:   Pt educated on importance of OOB activity to decrease the risks associated with bed rest. Practiced transfers 4 times in order to improve understanding of weightbearing status with mobility. Patient performed 30x each LAQ and seated marching. Instructed to perform quad sets and glute sets while in bed to increase blood flow for healing. Pt educated on plan and goals with physical therapy. Increased time spent actively listening to patient's concerns about mobility.     Patient left up in chair with all lines intact, call button in reach and nurse present..    GOALS:   Multidisciplinary Problems     Physical Therapy Goals        Problem: Physical Therapy Goal    Goal Priority  Disciplines Outcome Goal Variances Interventions   Physical Therapy Goal     PT, PT/OT Ongoing, Progressing     Description:  Goals to be met by: 20     Patient will increase functional independence with mobility by performin. Sit to stand transfer with Modified [place].  2. Bed to chair transfer with Modified [place] using Quad Cane.  3. Gait  x 100 feet with CGA using Quad Cane. - Cancelled  secondary to WB status  4. Ascend/descend 2 stair with bilateral Handrails Contact Guard Assistance using Quad Cane. - Cancelled  secondary to WB status  5. Lower extremity exercise program x 20 reps per handout, with assistance as needed.  6. Patient will ambulated 10 ft with CGA using quad cane and heel only WB.    7. Patient will propel wheelchair 50ft with SBA using Bilateral UE - updated 20                        Time Tracking:     PT Received On: 20  PT Start Time: 1057     PT Stop Time: 1123  PT Total Time (min): 26 min     Billable Minutes: Therapeutic Activity 16 and Therapeutic Exercise 10    Treatment Type: Treatment  PT/PTA: PT     PTA Visit Number: 0     Stacy Gamez, PT  2020

## 2020-02-18 NOTE — PLAN OF CARE
02/18/20 0937   Post-Acute Status   Post-Acute Authorization Placement   Post-Acute Placement Status Referrals Sent   SW spoke with patient about SNF choices for post acute care. Patient identifying [facility] as only preferred choice at this time. Patient states will continue to review list and provide additional choices later this afternoon, if necessary. Patient wants SW to secure placement at [facility]. SW unable to place referral to provider due to patient payor info not updated in system. System states patient patient with medicaid although has medicare as primary payor. PASCALE informed Klaudia with [facility] about this referral who will continue to follow patient.   Keysha Garcia LMSW [facility]     (9:42AM) PASCALE outreached patient daughter via phone. No answer. Unable to leave voicemail. CM to follow up with marie to obtain patient medicare info.   Keysha Garcia, LMSW [facility]

## 2020-02-19 LAB
ANION GAP SERPL CALC-SCNC: 7 MMOL/L (ref 8–16)
BASOPHILS # BLD AUTO: 0.04 K/UL (ref 0–0.2)
BASOPHILS NFR BLD: 0.6 % (ref 0–1.9)
BUN SERPL-MCNC: 8 MG/DL (ref 8–23)
CALCIUM SERPL-MCNC: 9.4 MG/DL (ref 8.7–10.5)
CHLORIDE SERPL-SCNC: 103 MMOL/L (ref 95–110)
CO2 SERPL-SCNC: 29 MMOL/L (ref 23–29)
CREAT SERPL-MCNC: 0.8 MG/DL (ref 0.5–1.4)
DIFFERENTIAL METHOD: ABNORMAL
EOSINOPHIL # BLD AUTO: 0.3 K/UL (ref 0–0.5)
EOSINOPHIL NFR BLD: 4.5 % (ref 0–8)
ERYTHROCYTE [DISTWIDTH] IN BLOOD BY AUTOMATED COUNT: 14.2 % (ref 11.5–14.5)
EST. GFR  (AFRICAN AMERICAN): >60 ML/MIN/1.73 M^2
EST. GFR  (NON AFRICAN AMERICAN): >60 ML/MIN/1.73 M^2
GLUCOSE SERPL-MCNC: 225 MG/DL (ref 70–110)
HCT VFR BLD AUTO: 30.9 % (ref 37–48.5)
HGB BLD-MCNC: 9.9 G/DL (ref 12–16)
IMM GRANULOCYTES # BLD AUTO: 0.02 K/UL (ref 0–0.04)
IMM GRANULOCYTES NFR BLD AUTO: 0.3 % (ref 0–0.5)
LYMPHOCYTES # BLD AUTO: 2.3 K/UL (ref 1–4.8)
LYMPHOCYTES NFR BLD: 32.8 % (ref 18–48)
MCH RBC QN AUTO: 30.5 PG (ref 27–31)
MCHC RBC AUTO-ENTMCNC: 32 G/DL (ref 32–36)
MCV RBC AUTO: 95 FL (ref 82–98)
MONOCYTES # BLD AUTO: 0.7 K/UL (ref 0.3–1)
MONOCYTES NFR BLD: 9.6 % (ref 4–15)
NEUTROPHILS # BLD AUTO: 3.7 K/UL (ref 1.8–7.7)
NEUTROPHILS NFR BLD: 52.2 % (ref 38–73)
NRBC BLD-RTO: 0 /100 WBC
PLATELET # BLD AUTO: 254 K/UL (ref 150–350)
PMV BLD AUTO: 10.1 FL (ref 9.2–12.9)
POCT GLUCOSE: 115 MG/DL (ref 70–110)
POCT GLUCOSE: 184 MG/DL (ref 70–110)
POCT GLUCOSE: 199 MG/DL (ref 70–110)
POCT GLUCOSE: 212 MG/DL (ref 70–110)
POTASSIUM SERPL-SCNC: 3.5 MMOL/L (ref 3.5–5.1)
RBC # BLD AUTO: 3.25 M/UL (ref 4–5.4)
SODIUM SERPL-SCNC: 139 MMOL/L (ref 136–145)
WBC # BLD AUTO: 7.11 K/UL (ref 3.9–12.7)

## 2020-02-19 PROCEDURE — A4216 STERILE WATER/SALINE, 10 ML: HCPCS | Performed by: HOSPITALIST

## 2020-02-19 PROCEDURE — 11000001 HC ACUTE MED/SURG PRIVATE ROOM

## 2020-02-19 PROCEDURE — 80048 BASIC METABOLIC PNL TOTAL CA: CPT

## 2020-02-19 PROCEDURE — 85025 COMPLETE CBC W/AUTO DIFF WBC: CPT

## 2020-02-19 PROCEDURE — 25000003 PHARM REV CODE 250: Performed by: HOSPITALIST

## 2020-02-19 PROCEDURE — 99232 SBSQ HOSP IP/OBS MODERATE 35: CPT | Mod: ,,, | Performed by: HOSPITALIST

## 2020-02-19 PROCEDURE — 97542 WHEELCHAIR MNGMENT TRAINING: CPT

## 2020-02-19 PROCEDURE — 63600175 PHARM REV CODE 636 W HCPCS: Performed by: HOSPITALIST

## 2020-02-19 PROCEDURE — 99232 PR SUBSEQUENT HOSPITAL CARE,LEVL II: ICD-10-PCS | Mod: ,,, | Performed by: HOSPITALIST

## 2020-02-19 PROCEDURE — 63600175 PHARM REV CODE 636 W HCPCS: Performed by: NURSE PRACTITIONER

## 2020-02-19 RX ORDER — LISINOPRIL 20 MG/1
40 TABLET ORAL DAILY
Status: DISCONTINUED | OUTPATIENT
Start: 2020-02-20 | End: 2020-02-23

## 2020-02-19 RX ADMIN — Medication 10 ML: at 12:02

## 2020-02-19 RX ADMIN — Medication 10 ML: at 06:02

## 2020-02-19 RX ADMIN — ENOXAPARIN SODIUM 40 MG: 100 INJECTION SUBCUTANEOUS at 05:02

## 2020-02-19 RX ADMIN — PIPERACILLIN AND TAZOBACTAM 4.5 G: 4; .5 INJECTION, POWDER, LYOPHILIZED, FOR SOLUTION INTRAVENOUS; PARENTERAL at 05:02

## 2020-02-19 RX ADMIN — PIPERACILLIN AND TAZOBACTAM 4.5 G: 4; .5 INJECTION, POWDER, LYOPHILIZED, FOR SOLUTION INTRAVENOUS; PARENTERAL at 10:02

## 2020-02-19 RX ADMIN — DOCUSATE SODIUM - SENNOSIDES 1 TABLET: 50; 8.6 TABLET, FILM COATED ORAL at 08:02

## 2020-02-19 RX ADMIN — PIPERACILLIN AND TAZOBACTAM 4.5 G: 4; .5 INJECTION, POWDER, LYOPHILIZED, FOR SOLUTION INTRAVENOUS; PARENTERAL at 02:02

## 2020-02-19 NOTE — PLAN OF CARE
02/19/20 0943   Post-Acute Status   Post-Acute Authorization Placement   Post-Acute Placement Status Pending Payor Review   PASCALE spoke with Klaudia from [facility] who states will try and work on patient auth status with payor. If auth obtained than patient can discharge to [facility] today. Awaiting authorization from payor at this time. PASCALE will remain in contact with Klaudia for updates on auth status. SW in communication with CM and patient care team.  Keysha Garcia LMSW [facility]     (11:01am) PASCALE spoke with Klaudia from [facility] who reports patient auth still pending but should receive soon. Klaudia reports will have a bed for patient to discharge to [facility] for tomorrow.   Keysha Garcia LMSW [facility]

## 2020-02-19 NOTE — PLAN OF CARE
Problem: Physical Therapy Goal  Goal: Physical Therapy Goal  Description  Goals to be met by: 20     Patient will increase functional independence with mobility by performin. Sit to stand transfer with Modified [place].  2. Bed to chair transfer with Modified [place] using Quad Cane.  3. Gait  x 100 feet with CGA using Quad Cane. - Cancelled  secondary to WB status  4. Ascend/descend 2 stair with bilateral Handrails Contact Guard Assistance using Quad Cane. - Cancelled  secondary to WB status  5. Lower extremity exercise program x 20 reps per handout, with assistance as needed.  6. Patient will ambulated 10 ft with CGA using quad cane and heel only WB.    7. Patient will propel wheelchair 150ft with SBA using Bilateral UE and R LE - updated 20        Outcome: Ongoing, Progressing     Patient progressing appropriately towards current goals and plan of care remains appropriate at this time. Patient demonstrates good motivation and good activity tolerance secondary appropriate pain control and overall good baseline mobility.  Patient willing to participate in skilled PT with good strength but with some impulsivity and decreased safety.  Patient required between SBA and CGA for bed mobility, transfers, wheelchair mobility and ambulation.  Patient was with decreased safety during transfers but only requires between CGA for transfers and ambulation of 10 ft with quad cane.      Chance Santamaria, PT, DPT  2020  Pager #: [phone]

## 2020-02-19 NOTE — PROGRESS NOTES
[facility] Hospital Medicine  Progress Note    Patient Name: Ana Maria Souza  MRN: [id number]  Patient Class: IP- Inpatient   Admission Date: 2/12/2020  Length of Stay: 7 days  Attending Physician: Bee Jasso MD  Primary Care Provider: Primary Doctor [facility] Medicine Team: [facility] K Bee Jasso MD    Subjective:     Principal Problem:Diabetic foot infection    Interval History: POD 3 from podiatry I/D 2/13. Podiatry has assess foot today with recs to follow. Wound vac in place and boot on. Patient without complaints or concerns.     Review of Systems   Constitutional: Positive for chills and fatigue. Negative for fever.   HENT: Negative for congestion and trouble swallowing.    Eyes: Negative for discharge and itching.   Respiratory: Negative for apnea, cough, chest tightness and shortness of breath.    Cardiovascular: Negative for chest pain and leg swelling.   Gastrointestinal: Negative for abdominal distention and abdominal pain.   Endocrine: Negative for cold intolerance, polydipsia and polyphagia.   Genitourinary: Negative for difficulty urinating, dysuria and flank pain.   Musculoskeletal: Negative for arthralgias and back pain.   Neurological: Negative for dizziness, syncope, facial asymmetry and light-headedness.   Hematological: Does not bruise/bleed easily.   Psychiatric/Behavioral: Negative for decreased concentration and hallucinations.     Objective:     Vital Signs (Most Recent):  Temp: 97.2 °F (36.2 °C) (02/19/20 0756)  Pulse: 88 (02/19/20 0756)  Resp: 16 (02/19/20 0756)  BP: 139/75 (02/19/20 0756)  SpO2: 98 % (02/19/20 0756) Vital Signs (24h Range):  Temp:  [97.2 °F (36.2 °C)-98.6 °F (37 °C)] 97.2 °F (36.2 °C)  Pulse:  [75-88] 88  Resp:  [16-18] 16  SpO2:  [95 %-98 %] 98 %  BP: (139-164)/(70-82) 139/75     Weight: 79.4 kg (175 lb)  Body mass index is 28.25 kg/m².    Intake/Output Summary (Last 24 hours) at 2/19/2020 1022  Last data filed at  2/18/2020 1903  Gross per 24 hour   Intake 650 ml   Output 2 ml   Net 648 ml      Constitutional: Appears well developed and well nourished.  Foul smelling odor extending into the hallway  Head: Normocephalic and atraumatic.   Mouth/Throat: Oropharynx is clear and moist.   Eyes: EOM are normal. Pupils are equal, round, and reactive to light. No scleral icterus.   Neck: Normal range of motion. Neck supple.   Cardiovascular: Normal heart rate.  Regular heart rhythm.  No murmur heard.  Pulmonary/Chest: Effort normal. No respiratory distress. No wheezes, rales, or rhonchi  Abdominal: Soft. Bowel sounds are normal.  No distension.  No tenderness  Musculoskeletal: Normal range of motion. Left foot wrapped with foul smelling odor  Neurological: Alert and oriented to person, place, and time.   Skin: Skin is warm and dry.   Psychiatric: Normal mood and affect. Behavior is normal.          Overview/Hospital Course:   History of Present Illness:     Ms. Ana Maria Souza is a 69 y.o. female with T2DM not on medication, who presents to the ED for evaluation of a foul smelling left foot wound.  She mentons that about 3 months prior to admission, she stepped on a nail.  She didn't seek medical care at that time, but she developed a small found on the dorsum of her left foot.  She started to use OTC creams about 2 weeks ago when she saw that the wound was not healing and seemed to be opening more.  She was using Silver Sulfadiazine and Ammonia Acetate creams, and the wound began to harden with a thick eschar over the next few days.  She then pulled off the eschar, and noticed white purulent material was produced along with a foul smelling odor.  Over time, she has noticed some swelling and erythema extending to her left leg.  She decided to wait and go to the ER once her Medicaid was finalized.  She denies any fevers or chills.  She denies any difficulty with ambulation.  She has been taking Tylenol and Tylenol Extra Strength for  pain.     Past Medical History: Patient has no past medical history on file.        Past Surgical History: Patient has a past surgical history that includes Tympanoplasty.        Social History: Patient reports that she has never smoked. She has never used smokeless tobacco. She reports that she does not use drugs.        Family History: Patient's family history is not on file.     Significant Labs:   A1C:   Recent Labs   Lab 02/12/20  1928   HGBA1C 7.6*     CBC:   No results for input(s): WBC, HGB, HCT, PLT in the last 48 hours.    Significant Imaging: I have reviewed all pertinent imaging results/findings within the past 24 hours.  MRI with gas and abscess of foot. Left side    Assessment/Plan:      Active Diagnoses:    Diagnosis Date Noted POA    PRINCIPAL PROBLEM:  Diabetic foot infection [E11.628, L08.9] 02/12/2020 Yes    Acute osteomyelitis of toe of left foot [M86.172] 02/13/2020 Unknown    Chronic pain of left ankle [M25.572, G89.29] 02/13/2020 Unknown    Type 2 diabetes mellitus, without long-term current use of insulin [E11.9] 02/12/2020 Yes    Essential hypertension [I10] 02/12/2020 Yes             Diabetic Foot Wound  · Afebrile and without leukocytosis.  ESR 88, CRP 7  · HbA1c 7.6  · MRI with left forefoot with associated heterogeneous region of central non-enhancement concerning for abscess or necrotic tissue with dimensions as above.  There are scattered foci of susceptibility artifact throughout this region concerning for soft tissue gas. Abnormal marrow edema and enhancement involving the 5th metatarsal and phalanges as well as the proximal and middle phalanges of the 4th toe concerning for associated osteomyelitis.  · Podiatry consulted, appreciate assistance. I/D on 2/13.   · ID consulted, appreciate assistance.  ESCHERICHIA COLI and ENTEROCOCCUS FAECALIS  Plans for 6 weeks zosyn  · zosyn 4.5 grams extended infusion IV q 8 hours.    2.  Estimated duration of antibiotics - 6 weeks from date of  surgery. Estimated end date: 3/26/20  3.  Weekly cbc, cmp, crp, esr and fax results to ID at [phone]  4.  ID follow up 2 -3 weeks.   ID will make the appointment.  If no appointment scheduled prior to discharge, please send Epic message to ID charge nurse Tammie Rico.   · Wound vac in place, darco boot onm, emphasized heel walking to patient  · Walker ordered   · Needs vascular evaluation but patient currently refuses. Will continue to emphasize.      Essential Hypertension  · Chronic issue but not on medication at home  · Start Lisinopril 10mg.  Titrate up as needed     Type 2 DM without Long Term Insulin Use  · HbA1c 7.6  · Not on medication at home  · SSI with POCT accuchecks and Diabetic diet        Diet:  diabetic   VTE PPx:  Ambulatory  Goals of Care:  Full       Disposition:  Pending Podiatry and ID recs but most likely will need SNF/NH placement for long term ABX as patient does not believe she can do this at home.     Discharge Needs:  Patient will need placement for IV abx and possible wound vac as she cannot administer herself at home and has little social support.     Bee Jasso MD  Department of Hospital Medicine   [facility]

## 2020-02-19 NOTE — PROGRESS NOTES
[facility] Hospital Medicine  Progress Note    Patient Name: Ana Maria Souza  MRN: [id number]  Patient Class: IP- Inpatient   Admission Date: 2/12/2020  Length of Stay: 7 days  Attending Physician: Bee Jasso MD  Primary Care Provider: [facility] [place] [place] Team: [facility]  Bee Jasso MD    Subjective:     Principal Problem:Diabetic foot infection    Interval History:  podiatry I/D 2/13. Podiatry has assess foot today with recs to follow. Wound vac in place and boot on. Patient without complaints or concerns.     Review of Systems   Constitutional: Positive for chills and fatigue. Negative for fever.   HENT: Negative for congestion and trouble swallowing.    Eyes: Negative for discharge and itching.   Respiratory: Negative for apnea, cough, chest tightness and shortness of breath.    Cardiovascular: Negative for chest pain and leg swelling.   Gastrointestinal: Negative for abdominal distention and abdominal pain.   Endocrine: Negative for cold intolerance, polydipsia and polyphagia.   Genitourinary: Negative for difficulty urinating, dysuria and flank pain.   Musculoskeletal: Negative for arthralgias and back pain.   Neurological: Negative for dizziness, syncope, facial asymmetry and light-headedness.   Hematological: Does not bruise/bleed easily.   Psychiatric/Behavioral: Negative for decreased concentration and hallucinations.     Objective:     Vital Signs (Most Recent):  Temp: 97.7 °F (36.5 °C) (02/19/20 1521)  Pulse: 69 (02/19/20 1521)  Resp: 16 (02/19/20 1132)  BP: (!) 157/74 (02/19/20 1521)  SpO2: 96 % (02/19/20 1521) Vital Signs (24h Range):  Temp:  [97.2 °F (36.2 °C)-98.5 °F (36.9 °C)] 97.7 °F (36.5 °C)  Pulse:  [69-88] 69  Resp:  [16-18] 16  SpO2:  [95 %-98 %] 96 %  BP: (139-162)/(70-82) 157/74     Weight: 79.4 kg (175 lb)  Body mass index is 28.25 kg/m².    Intake/Output Summary (Last 24 hours) at 2/19/2020 1621  Last data filed at 2/18/2020  1903  Gross per 24 hour   Intake 350 ml   Output 1 ml   Net 349 ml      Constitutional: Appears well developed and well nourished.  Foul smelling odor extending into the hallway  Head: Normocephalic and atraumatic.   Mouth/Throat: Oropharynx is clear and moist.   Eyes: EOM are normal. Pupils are equal, round, and reactive to light. No scleral icterus.   Neck: Normal range of motion. Neck supple.   Cardiovascular: Normal heart rate.  Regular heart rhythm.  No murmur heard.  Pulmonary/Chest: Effort normal. No respiratory distress. No wheezes, rales, or rhonchi  Abdominal: Soft. Bowel sounds are normal.  No distension.  No tenderness  Musculoskeletal: Normal range of motion. Left foot wrapped with foul smelling odor  Neurological: Alert and oriented to person, place, and time.   Skin: Skin is warm and dry.   Psychiatric: Normal mood and affect. Behavior is normal.          Overview/Hospital Course:   History of Present Illness:     Ms. Ana Maria Souza is a 69 y.o. female with T2DM not on medication, who presents to the ED for evaluation of a foul smelling left foot wound.  She mentons that about 3 months prior to admission, she stepped on a nail.  She didn't seek medical care at that time, but she developed a small found on the dorsum of her left foot.  She started to use OTC creams about 2 weeks ago when she saw that the wound was not healing and seemed to be opening more.  She was using Silver Sulfadiazine and Ammonia Acetate creams, and the wound began to harden with a thick eschar over the next few days.  She then pulled off the eschar, and noticed white purulent material was produced along with a foul smelling odor.  Over time, she has noticed some swelling and erythema extending to her left leg.  She decided to wait and go to the ER once her Medicaid was finalized.  She denies any fevers or chills.  She denies any difficulty with ambulation.  She has been taking Tylenol and Tylenol Extra Strength for  pain.     Past Medical History: Patient has no past medical history on file.        Past Surgical History: Patient has a past surgical history that includes Tympanoplasty.        Social History: Patient reports that she has never smoked. She has never used smokeless tobacco. She reports that she does not use drugs.        Family History: Patient's family history is not on file.     Significant Labs:   A1C:   Recent Labs   Lab 02/12/20  1928   HGBA1C 7.6*     CBC:   Recent Labs   Lab 02/19/20  1100   WBC 7.11   HGB 9.9*   HCT 30.9*          Significant Imaging: I have reviewed all pertinent imaging results/findings within the past 24 hours.  MRI with gas and abscess of foot. Left side    Assessment/Plan:      Active Diagnoses:    Diagnosis Date Noted POA    PRINCIPAL PROBLEM:  Diabetic foot infection [E11.628, L08.9] 02/12/2020 Yes    Acute osteomyelitis of toe of left foot [M86.172] 02/13/2020 Unknown    Chronic pain of left ankle [M25.572, G89.29] 02/13/2020 Unknown    Type 2 diabetes mellitus, without long-term current use of insulin [E11.9] 02/12/2020 Yes    Essential hypertension [I10] 02/12/2020 Yes             Diabetic Foot Wound  · Afebrile and without leukocytosis.  ESR 88, CRP 7  · HbA1c 7.6  · MRI with left forefoot with associated heterogeneous region of central non-enhancement concerning for abscess or necrotic tissue with dimensions as above.  There are scattered foci of susceptibility artifact throughout this region concerning for soft tissue gas. Abnormal marrow edema and enhancement involving the 5th metatarsal and phalanges as well as the proximal and middle phalanges of the 4th toe concerning for associated osteomyelitis.  · Podiatry consulted, appreciate assistance. I/D on 2/13.   · ID consulted, appreciate assistance.  ESCHERICHIA COLI and ENTEROCOCCUS FAECALIS  Plans for 6 weeks zosyn  · zosyn 4.5 grams extended infusion IV q 8 hours.    2.  Estimated duration of antibiotics - 6  weeks from date of surgery. Estimated end date: 3/26/20  3.  Weekly cbc, cmp, crp, esr and fax results to ID at [phone]  4.  ID follow up 2 -3 weeks.   ID will make the appointment.  If no appointment scheduled prior to discharge, please send Epic message to ID charge nurse Tammie Rico.   · Wound vac in place, darco boot onm, emphasized heel walking to patient  · Walker and quad cane ordered   · Needs vascular evaluation but patient currently refuses. Will continue to emphasize.      Essential Hypertension  · Chronic issue but not on medication at home  · Start Lisinopril 10mg.  Titrate up as needed     Type 2 DM without Long Term Insulin Use  · HbA1c 7.6  · Not on medication at home  · SSI with POCT accuchecks and Diabetic diet        Diet:  diabetic   VTE PPx:  Ambulatory  Goals of Care:  Full       Disposition:  Pending Podiatry and ID recs but most likely will need SNF/NH placement for long term ABX as patient does not believe she can do this at home.     Discharge Needs:  Patient will need placement for IV abx and possible wound vac as she cannot administer herself at home and has little social support.     Bee Jasso MD  Department of Hospital Medicine   [facility]

## 2020-02-19 NOTE — PLAN OF CARE
Pt is AAOX4,VSS. Pt is progressing towards plan of care goals. Pain management has been assessed. Pt reports pain at a 0. Pain reassessed throughout shift. SCDs in place with frequent checks for skin breakdown. Wound vac drainage assessed and documented. Right upper arm PICC in place flushes well. Fall precautions in place, no report of falls. Commode, cane at bedside. Safety measures are in place bed in lowest position, wheels locked, call light within reach. Will continue to monitor.

## 2020-02-20 PROBLEM — M79.89 LEFT ARM SWELLING: Status: ACTIVE | Noted: 2020-02-20

## 2020-02-20 PROBLEM — M17.12 DEGENERATIVE JOINT DISEASE OF KNEE, LEFT: Status: ACTIVE | Noted: 2020-02-20

## 2020-02-20 PROBLEM — I80.9 SUPERFICIAL THROMBOPHLEBITIS: Status: ACTIVE | Noted: 2020-02-20

## 2020-02-20 PROBLEM — M25.462 SWELLING OF LEFT KNEE JOINT: Status: ACTIVE | Noted: 2020-02-20

## 2020-02-20 LAB
ALBUMIN SERPL BCP-MCNC: 2.7 G/DL (ref 3.5–5.2)
ANION GAP SERPL CALC-SCNC: 6 MMOL/L (ref 8–16)
BASOPHILS # BLD AUTO: 0.04 K/UL (ref 0–0.2)
BASOPHILS NFR BLD: 0.6 % (ref 0–1.9)
BUN SERPL-MCNC: 8 MG/DL (ref 8–23)
CALCIUM SERPL-MCNC: 8.9 MG/DL (ref 8.7–10.5)
CHLORIDE SERPL-SCNC: 106 MMOL/L (ref 95–110)
CO2 SERPL-SCNC: 30 MMOL/L (ref 23–29)
CREAT SERPL-MCNC: 0.7 MG/DL (ref 0.5–1.4)
DIFFERENTIAL METHOD: ABNORMAL
EOSINOPHIL # BLD AUTO: 0.4 K/UL (ref 0–0.5)
EOSINOPHIL NFR BLD: 5.9 % (ref 0–8)
ERYTHROCYTE [DISTWIDTH] IN BLOOD BY AUTOMATED COUNT: 14 % (ref 11.5–14.5)
EST. GFR  (AFRICAN AMERICAN): >60 ML/MIN/1.73 M^2
EST. GFR  (NON AFRICAN AMERICAN): >60 ML/MIN/1.73 M^2
GLUCOSE SERPL-MCNC: 102 MG/DL (ref 70–110)
HCT VFR BLD AUTO: 29.8 % (ref 37–48.5)
HGB BLD-MCNC: 9.3 G/DL (ref 12–16)
IMM GRANULOCYTES # BLD AUTO: 0.03 K/UL (ref 0–0.04)
IMM GRANULOCYTES NFR BLD AUTO: 0.5 % (ref 0–0.5)
LYMPHOCYTES # BLD AUTO: 2.7 K/UL (ref 1–4.8)
LYMPHOCYTES NFR BLD: 42 % (ref 18–48)
MCH RBC QN AUTO: 29.8 PG (ref 27–31)
MCHC RBC AUTO-ENTMCNC: 31.2 G/DL (ref 32–36)
MCV RBC AUTO: 96 FL (ref 82–98)
MONOCYTES # BLD AUTO: 0.7 K/UL (ref 0.3–1)
MONOCYTES NFR BLD: 10.3 % (ref 4–15)
NEUTROPHILS # BLD AUTO: 2.6 K/UL (ref 1.8–7.7)
NEUTROPHILS NFR BLD: 40.7 % (ref 38–73)
NRBC BLD-RTO: 0 /100 WBC
PHOSPHATE SERPL-MCNC: 3.1 MG/DL (ref 2.7–4.5)
PLATELET # BLD AUTO: 250 K/UL (ref 150–350)
PMV BLD AUTO: 9.7 FL (ref 9.2–12.9)
POCT GLUCOSE: 104 MG/DL (ref 70–110)
POCT GLUCOSE: 131 MG/DL (ref 70–110)
POCT GLUCOSE: 169 MG/DL (ref 70–110)
POCT GLUCOSE: 256 MG/DL (ref 70–110)
POTASSIUM SERPL-SCNC: 3.6 MMOL/L (ref 3.5–5.1)
RBC # BLD AUTO: 3.12 M/UL (ref 4–5.4)
SODIUM SERPL-SCNC: 142 MMOL/L (ref 136–145)
WBC # BLD AUTO: 6.31 K/UL (ref 3.9–12.7)

## 2020-02-20 PROCEDURE — 97116 GAIT TRAINING THERAPY: CPT | Mod: CQ

## 2020-02-20 PROCEDURE — 99232 SBSQ HOSP IP/OBS MODERATE 35: CPT | Mod: ,,, | Performed by: HOSPITALIST

## 2020-02-20 PROCEDURE — 97530 THERAPEUTIC ACTIVITIES: CPT | Mod: CQ

## 2020-02-20 PROCEDURE — 85025 COMPLETE CBC W/AUTO DIFF WBC: CPT

## 2020-02-20 PROCEDURE — 63600175 PHARM REV CODE 636 W HCPCS: Performed by: HOSPITALIST

## 2020-02-20 PROCEDURE — 80069 RENAL FUNCTION PANEL: CPT

## 2020-02-20 PROCEDURE — A4216 STERILE WATER/SALINE, 10 ML: HCPCS | Performed by: HOSPITALIST

## 2020-02-20 PROCEDURE — 25000003 PHARM REV CODE 250: Performed by: HOSPITALIST

## 2020-02-20 PROCEDURE — 63600175 PHARM REV CODE 636 W HCPCS: Performed by: NURSE PRACTITIONER

## 2020-02-20 PROCEDURE — 11000001 HC ACUTE MED/SURG PRIVATE ROOM

## 2020-02-20 PROCEDURE — 99232 PR SUBSEQUENT HOSPITAL CARE,LEVL II: ICD-10-PCS | Mod: ,,, | Performed by: HOSPITALIST

## 2020-02-20 RX ORDER — TALC
6 POWDER (GRAM) TOPICAL NIGHTLY PRN
Refills: 0 | COMMUNITY
Start: 2020-02-20

## 2020-02-20 RX ORDER — INSULIN ASPART 100 [IU]/ML
0-5 INJECTION, SOLUTION INTRAVENOUS; SUBCUTANEOUS
Refills: 0
Start: 2020-02-20 | End: 2021-02-19

## 2020-02-20 RX ORDER — BISACODYL 10 MG
10 SUPPOSITORY, RECTAL RECTAL DAILY PRN
Refills: 0
Start: 2020-02-20

## 2020-02-20 RX ORDER — SODIUM CHLORIDE 0.9 % (FLUSH) 0.9 %
10 SYRINGE (ML) INJECTION EVERY 6 HOURS
Start: 2020-02-20

## 2020-02-20 RX ORDER — LISINOPRIL 40 MG/1
40 TABLET ORAL DAILY
Qty: 90 TABLET | Refills: 3
Start: 2020-02-21 | End: 2021-02-20

## 2020-02-20 RX ORDER — ACETAMINOPHEN 325 MG/1
650 TABLET ORAL EVERY 4 HOURS PRN
Refills: 0
Start: 2020-02-20

## 2020-02-20 RX ORDER — SODIUM CHLORIDE 0.9 % (FLUSH) 0.9 %
10 SYRINGE (ML) INJECTION
Start: 2020-02-20 | End: 2020-03-05 | Stop reason: SDUPTHER

## 2020-02-20 RX ORDER — HYDROCODONE BITARTRATE AND ACETAMINOPHEN 5; 325 MG/1; MG/1
1 TABLET ORAL EVERY 4 HOURS PRN
Qty: 10 TABLET | Refills: 0 | Status: SHIPPED | OUTPATIENT
Start: 2020-02-20 | End: 2020-03-05 | Stop reason: SDUPTHER

## 2020-02-20 RX ORDER — AMOXICILLIN 250 MG
1 CAPSULE ORAL 2 TIMES DAILY PRN
Start: 2020-02-20

## 2020-02-20 RX ADMIN — PIPERACILLIN AND TAZOBACTAM 4.5 G: 4; .5 INJECTION, POWDER, LYOPHILIZED, FOR SOLUTION INTRAVENOUS; PARENTERAL at 04:02

## 2020-02-20 RX ADMIN — Medication 10 ML: at 06:02

## 2020-02-20 RX ADMIN — DOCUSATE SODIUM - SENNOSIDES 1 TABLET: 50; 8.6 TABLET, FILM COATED ORAL at 09:02

## 2020-02-20 RX ADMIN — PIPERACILLIN AND TAZOBACTAM 4.5 G: 4; .5 INJECTION, POWDER, LYOPHILIZED, FOR SOLUTION INTRAVENOUS; PARENTERAL at 02:02

## 2020-02-20 RX ADMIN — Medication 10 ML: at 12:02

## 2020-02-20 RX ADMIN — ENOXAPARIN SODIUM 40 MG: 100 INJECTION SUBCUTANEOUS at 04:02

## 2020-02-20 RX ADMIN — PIPERACILLIN AND TAZOBACTAM 4.5 G: 4; .5 INJECTION, POWDER, LYOPHILIZED, FOR SOLUTION INTRAVENOUS; PARENTERAL at 09:02

## 2020-02-20 NOTE — ASSESSMENT & PLAN NOTE
Ana Maria Souza is a 69 y.o. 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 Peterson).     Plan:  - VAC was removed in anticipation for DC to facility for replacement. Wound was packed lightly with aquacel and covered with a DSD.  - Continue local wound care.  - Podiatry will follow.    Patient to be WBAT to heel for short distances in orthopedic shoe    DC Instructions:  Patient is to follow up with podiatry within 10 days of discharge.  Call [phone]  to make an appointment.  Home health/facility to do dressing changes every 2-3 days as follows:  Rinse with saline, pat dry, apply wound vac on 75mmHg slow continuous therapy.  Dress with 2 rolls cast padding and flexinet.

## 2020-02-20 NOTE — PLAN OF CARE
1 goal met. Goals remain appropriate.     Problem: Physical Therapy Goal  Goal: Physical Therapy Goal  Description  Goals to be met by: 20     Patient will increase functional independence with mobility by performin. Sit to stand transfer with Modified [place].  2. Bed to chair transfer with Modified [place] using Quad Cane.  3. Gait  x 100 feet with CGA using Quad Cane. - Cancelled  secondary to WB status  4. Ascend/descend 2 stair with bilateral Handrails Contact Guard Assistance using Quad Cane. - Cancelled  secondary to WB status  5. Lower extremity exercise program x 20 reps per handout, with assistance as needed.  6. Patient will ambulated 10 ft with CGA using quad cane and heel only WB.  - met   7. Patient will propel wheelchair 150ft with SBA using Bilateral UE and R LE - updated 20         Outcome: Ongoing, Progressing

## 2020-02-20 NOTE — PLAN OF CARE
02/20/20 1029   Post-Acute Status   Post-Acute Authorization Placement;Other   Post-Acute Placement Status Patient List Provided   Discharge Delays (!) Other  (Patient denied to [facility] SNF due to out of network with payor)   PASCALE spoke with Klaudia from  SNF who states having to deny patient admission due to being out of network with [employer] as of Jan 1 2020. Klaudia reports was unaware of this change in policy with [employer]. SW will meet with patient to provide list of available SNF providers that are in network with patient payor.Keysha Garcia LMSW [facility]     (10:39AM) SW met with patient to update on denial to [facility]. SW reports being informed by tx team that her anticipated dc date wasn't until tomorrow and that's hes not medically cleared at this time. SW provided patient with list of SNF providers. Patient identified [facility] SN[facility] and [facility] as choices to be referred to.  SW sent referral to providers via [facility]. PASCALE will continue to follow patient for post acute care needs.   Keysha Garcia LMSW [facility]

## 2020-02-20 NOTE — PLAN OF CARE
Informed this morning by Klaudia with [facility] that patient insurance is not in network. SW giving list of approved SNF to patient.        02/20/20 8452   Discharge Reassessment   Assessment Type Discharge Planning Reassessment   Provided patient/caregiver education on the expected discharge date and the discharge plan Yes   Do you have any problems affording any of your prescribed medications? No   Discharge Plan A Skilled Nursing Facility   Discharge Plan B Skilled Nursing Facility   DME Needed Upon Discharge  none   Anticipated Discharge Disposition SNF

## 2020-02-20 NOTE — PLAN OF CARE
02/20/20 1338   Post-Acute Status   Post-Acute Authorization Placement   Post-Acute Placement Status Pending Post-Acute Clinical Review   PASCALE spoke with Kristina from [facility] ([phone]) who reports will review packet referral packet to consider for admit. PASCALE sent additional clinicals to provider via [employer]. Kristina reports will follow up after packet has been reviewed. [facility]   Keysha Garcia LMSW [facility]     (1:43PM) PASCALE outreached Kristina from [facility]. No answer. Left voicemail requesting return call.   Keysha Garcia LMSW [facility]    (2:43PM) PASCALE outreached Kristina with [facility] to follow up on patient referral. Kristina reports will get update and follow up with PASCALE afterwards.   Keysha Garcia LMSW [facility]     (3:05PM) PASCALE received call from patient stating that she just got off of phone with [place] Medicare rep and is requesting a referral be placed for [facility]. PASCALE sent referral to provider via [facility].   Keysha Garcia LMSW [facility]

## 2020-02-20 NOTE — PROGRESS NOTES
[facility] Hospital Medicine  Progress Note    Patient Name: Ana Maria Souza  MRN: [id number]  Patient Class: IP- Inpatient   Admission Date: 2/12/2020  Length of Stay: 8 days  Attending Physician: Reyna Tenorio MD  Primary Care Provider: Primary Doctor     Hospital Medicine Team: [facility] K Bee Jasso MD    Subjective:     Principal Problem:Diabetic foot infection    Interval History: pain controlled in foot, darcot shoe on. Had left antecucbital fossa pain, swelling and lump there, likely from a needlestick, U/S ordered, likely with superficial thrombophlebitis, will elevate and place warm compresses. Also with left knee pain ans chronic swelling to area, she was worried about this, xrays showing severe DJD, no effusions, referral placed to ortho clinic to f/u to discuss long term management, going to SNF so will get some PT/OT on it in interim. Glucose 180s mostly, rare 200 jaunt. Hg stable, BP 150s omstly. SNF orders ready, OSH referrals now as o snf now in network, will have vac there and has ID follow up already on 3/5 and needs poaitry in 10 days.     Review of Systems   Constitutional: Positive for chills and fatigue. Negative for fever.   HENT: Negative for congestion and trouble swallowing.    Eyes: Negative for discharge and itching.   Respiratory: Negative for apnea, cough, chest tightness and shortness of breath.    Cardiovascular: Negative for chest pain and leg swelling.   Gastrointestinal: Negative for abdominal distention and abdominal pain.   Endocrine: Negative for cold intolerance, polydipsia and polyphagia.   Genitourinary: Negative for difficulty urinating, dysuria and flank pain.   Musculoskeletal: Negative for arthralgias and back pain.   Neurological: Negative for dizziness, syncope, facial asymmetry and light-headedness.   Hematological: Does not bruise/bleed easily.   Psychiatric/Behavioral: Negative for decreased concentration and  hallucinations.     Objective:     Vital Signs (Most Recent):  Temp: 96.4 °F (35.8 °C) (02/20/20 1127)  Pulse: 92 (02/20/20 1518)  Resp: 18 (02/20/20 1518)  BP: (!) 150/76 (02/20/20 1518)  SpO2: 99 % (02/20/20 1518) Vital Signs (24h Range):  Temp:  [96.4 °F (35.8 °C)-98.4 °F (36.9 °C)] 96.4 °F (35.8 °C)  Pulse:  [72-92] 92  Resp:  [16-18] 18  SpO2:  [96 %-100 %] 99 %  BP: (150-170)/(73-77) 150/76     Weight: 79.4 kg (175 lb)  Body mass index is 28.25 kg/m².  No intake or output data in the 24 hours ending 02/20/20 1557   Constitutional: Appears well developed and well nourished.    Head: Normocephalic and atraumatic.   Mouth/Throat: Oropharynx is clear and moist.   Eyes: EOM are normal. Pupils are equal, round, and reactive to light. No scleral icterus.   Neck: Normal range of motion. Neck supple.   Cardiovascular: Normal heart rate.  Regular heart rhythm.  No murmur heard.  Pulmonary/Chest: Effort normal. No respiratory distress. No wheezes, rales, or rhonchi  Abdominal: Soft. Bowel sounds are normal.  No distension.  No tenderness  Musculoskeletal: Normal range of motion. Left foot wrapped. darcot shoe now.   Neurological: Alert and oriented to person, place, and time.   Skin: Skin is warm and dry.   Psychiatric: Normal mood and affect. Behavior is normal.          Overview/Hospital Course:   History of Present Illness:     Ms. Ana Maria Souza is a 69 y.o. female with T2DM not on medication, who presents to the ED for evaluation of a foul smelling left foot wound.  She mentons that about 3 months prior to admission, she stepped on a nail.  She didn't seek medical care at that time, but she developed a small found on the dorsum of her left foot.  She started to use OTC creams about 2 weeks ago when she saw that the wound was not healing and seemed to be opening more.  She was using Silver Sulfadiazine and Ammonia Acetate creams, and the wound began to harden with a thick eschar over the next few days.  She then  pulled off the eschar, and noticed white purulent material was produced along with a foul smelling odor.  Over time, she has noticed some swelling and erythema extending to her left leg.  She decided to wait and go to the ER once her Medicaid was finalized.  She denies any fevers or chills.  She denies any difficulty with ambulation.  She has been taking Tylenol and Tylenol Extra Strength for pain.     Past Medical History: Patient has no past medical history on file.        Past Surgical History: Patient has a past surgical history that includes Tympanoplasty.        Social History: Patient reports that she has never smoked. She has never used smokeless tobacco. She reports that she does not use drugs.        Family History: Patient's family history is not on file.     Significant Labs:   A1C:   Recent Labs   Lab 02/12/20  1928   HGBA1C 7.6*     CBC:   Recent Labs   Lab 02/19/20  1100 02/20/20  0600   WBC 7.11 6.31   HGB 9.9* 9.3*   HCT 30.9* 29.8*    250       Significant Imaging: I have reviewed all pertinent imaging results/findings within the past 24 hours.  MRI with gas and abscess of foot. Left side    Assessment/Plan:      Active Diagnoses:    Diagnosis Date Noted POA    PRINCIPAL PROBLEM:  Diabetic foot infection [E11.628, L08.9] 02/12/2020 Yes    Acute osteomyelitis of toe of left foot [M86.172] 02/13/2020 Unknown    Chronic pain of left ankle [M25.572, G89.29] 02/13/2020 Unknown    Type 2 diabetes mellitus, without long-term current use of insulin [E11.9] 02/12/2020 Yes    Essential hypertension [I10] 02/12/2020 Yes             Diabetic Foot Wound  · Afebrile and without leukocytosis.  ESR 88, CRP 7  · HbA1c 7.6  · MRI with left forefoot with associated heterogeneous region of central non-enhancement concerning for abscess or necrotic tissue with dimensions as above.  There are scattered foci of susceptibility artifact throughout this region concerning for soft tissue gas. Abnormal marrow  edema and enhancement involving the 5th metatarsal and phalanges as well as the proximal and middle phalanges of the 4th toe concerning for associated osteomyelitis.  · Podiatry consulted, appreciate assistance. I/D on 2/13.   · ID consulted, appreciate assistance.  ESCHERICHIA COLI and ENTEROCOCCUS FAECALIS.  · -recs for zosyn unti 3/26 for 6 weeks, has ID follow up on 35, weekly labs faxed to ID clinic.  · Wound vac in place, darco boot onm, emphasized heel walking to patient. Wound vac orders in place for SNF per podiatry recs. Need 10 day follow up with podiatry.  · Walker ordered   · Needs vascular evaluation but patient currently refuses.      Essential Hypertension  · Chronic issue but not on medication at home  · Cont lisinopril  ·   Type 2 DM without Long Term Insulin Use  · HbA1c 7.6  · Not on medication at home  · SSI with POCT accuchecks and Diabetic diet  · glucose mostly 180s  · Has new pcp on 3/25 follow up with glucose log to titrate prn    Knee Osteoarthriis  -severe on left on xrays, causes walking issues at home  -amb ref to ortho, no effusions    Left antecubital fossa superficial thrombophlebitis  -present on exam, likely from infusion or needlestick  -elevate, warm compresses, will self resolve with supportive care in interim  -U/S confirmation        Diet:  diabetic   VTE PPx:  Ambulatory  Goals of Care:  Full       Disposition:  pending snf now, as u/s and xrays done today for above, will be med stable tomorrow  Needs poodiatry f/u scheduled.   Ortho outpatient ref placed    Reyna Tenorio MD  Department of Hospital Medicine   [facility]

## 2020-02-20 NOTE — PT/OT/SLP PROGRESS
"Physical Therapy Treatment    Patient Name:  Ana Maria Souza   MRN:  [id number]    Recommendations:     Discharge Recommendations:  nursing facility, skilled   Discharge Equipment Recommendations: wheelchair (quad cane needs replacement rubber foot for safety)  Barriers to discharge: Decreased caregiver support    Assessment:     Ana Maria Souza is a 69 y.o. female admitted with a medical diagnosis of Diabetic foot infection.  She presents with the following impairments/functional limitations:  weakness, impaired self care skills, impaired functional mobilty, gait instability, impaired balance, decreased lower extremity function, impaired joint extensibility, orthopedic precautions. Pt demonstrates per overall understanding of her weight bearing restrictions this day and general safety awareness. Pt impulsively attempting to move about her room despite being tangled in her SCD and wound vac lines. Pt educated on reason for short distance/transfer only restriction for weight bearing and importance of adherence as pt reports she has frequently been ambulating in room without assistance. Pt requiring CGA for safety cues and steadying contact with mobility in tight confines of pts room. Pt will continue to benefit from therapy services to address impairments listed above.     Rehab Prognosis: Good; patient would benefit from acute skilled PT services to address these deficits and reach maximum level of function.    Recent Surgery: Procedure(s) (LRB):  INCISION AND DRAINAGE, FOOT (Left) 7 Days Post-Op    Plan:     During this hospitalization, patient to be seen 5 x/week to address the identified rehab impairments via gait training, therapeutic activities, therapeutic exercises, wheelchair management/training and progress toward the following goals:    · Plan of Care Expires:  03/14/20    Subjective     Chief Complaint: no c/o  Patient/Family Comments/goals: "I'm a , I've got to go, go, go. I don't sit around " "even at home."  Pain/Comfort:  · Pain Rating 1: 0/10  · Pain Rating Post-Intervention 1: 0/10      Objective:     Communicated with NSG prior to session.  Patient found sitting at EOB with telemetry, SCD, wound vac upon PTA entry to room.     General Precautions: Standard, fall, diabetic   Orthopedic Precautions:LLE weight bearing as tolerated   Braces: N/A     Functional Mobility:  · Transfers:     · Sit to Stand:  contact guard assistance with quad cane  · Bed to Chair: contact guard assistance with  quad cane  using  Step Transfer  · Gait: Pt ambulates ~10 ft in room to her bedside chair with QC and CGA for safety cues and steadying contact. Tendency for impulsive movements and poor attention to obstacles and general surroundings. Pt provided with QC from therapy dept to use during session as pts QC is unsafe for use d/t missing components.       AM-PAC 6 CLICK MOBILITY  Turning over in bed (including adjusting bedclothes, sheets and blankets)?: 4  Sitting down on and standing up from a chair with arms (e.g., wheelchair, bedside commode, etc.): 3  Moving from lying on back to sitting on the side of the bed?: 4  Moving to and from a bed to a chair (including a wheelchair)?: 3  Need to walk in hospital room?: 3  Climbing 3-5 steps with a railing?: 1  Basic Mobility Total Score: 18       Therapeutic Activities and Exercises:  Extensive time spent in education on weight bearing restrictions and safety with mobilization.   Pt assisted to bedside chair and encouraged to increase time UIC/OOB while reducing time spent weight bearing to LLE.     Patient left up in chair with all lines intact, call button in reach and PCT present.    GOALS:   Multidisciplinary Problems     Physical Therapy Goals        Problem: Physical Therapy Goal    Goal Priority Disciplines Outcome Goal Variances Interventions   Physical Therapy Goal     PT, PT/OT Ongoing, Progressing     Description:  Goals to be met by: 2/28/20     Patient will " increase functional independence with mobility by performin. Sit to stand transfer with Modified [place].  2. Bed to chair transfer with Modified [place] using Quad Cane.  3. Gait  x 100 feet with CGA using Quad Cane. - Cancelled  secondary to WB status  4. Ascend/descend 2 stair with bilateral Handrails Contact Guard Assistance using Quad Cane. - Cancelled  secondary to WB status  5. Lower extremity exercise program x 20 reps per handout, with assistance as needed.  6. Patient will ambulated 10 ft with CGA using quad cane and heel only WB.  - met   7. Patient will propel wheelchair 150ft with SBA using Bilateral UE and R LE - updated 20                          Time Tracking:     PT Received On: 20  PT Start Time: 931     PT Stop Time: 947  PT Total Time (min): 16 min     Billable Minutes: Therapeutic Activity 16    Treatment Type: Treatment  PT/PTA: PTA     PTA Visit Number: 1     Collins Vargas, PTA  2020

## 2020-02-20 NOTE — PLAN OF CARE
02/20/20 1225   Post-Acute Status   Post-Acute Authorization Placement   Post-Acute Placement Status Referrals Sent   Sw sent referrals to [facility] and [facility]. PASCALE sent updated clinicals to [facility], per provider request.  Keysha Garcia, LMSW [facility]

## 2020-02-20 NOTE — PROGRESS NOTES
[facility]  Podiatry  Progress Note    Patient Name: Ana Maria Souza  MRN: [id number]  Admission Date: 2/12/2020  Hospital Length of Stay: 8 days  Attending Physician: Reyna Tenorio MD  Primary Care Provider: Primary Doctor No   Interval Hx:    Patient seen at bedside today for wound vac removal in anticipation for discharge to facility to have it replaced there. She is doing well today.    Scheduled Meds:   enoxaparin  40 mg Subcutaneous Daily    lisinopril  40 mg Oral Daily    piperacillin-tazobactam (ZOSYN) IVPB  4.5 g Intravenous Q8H    senna-docusate 8.6-50 mg  1 tablet Oral Daily    sodium chloride 0.9%  10 mL Intravenous Q6H     Continuous Infusions:    PRN Meds:acetaminophen, bisacodyL, Dextrose 10% Bolus, Dextrose 10% Bolus, Dextrose 10% Bolus, Dextrose 10% Bolus, glucagon (human recombinant), glucose, glucose, hydrALAZINE, HYDROcodone-acetaminophen, insulin aspart U-100, melatonin, ondansetron, promethazine (PHENERGAN) IVPB, senna-docusate 8.6-50 mg, Flushing PICC Protocol **AND** sodium chloride 0.9% **AND** sodium chloride 0.9%, sodium chloride 0.9%, DIPH,PERTUS (ADACEL),TETANUS PF VAC (ADULT)    Review of patient's allergies indicates:  No Known Allergies     History reviewed. No pertinent past medical history.  Past Surgical History:   Procedure Laterality Date    INCISION AND DRAINAGE FOOT Left 2/13/2020    Procedure: INCISION AND DRAINAGE, FOOT;  Surgeon: Mau Peterson DPM;  Location: [facility] OR [address] [place] [address];  Service: Podiatry;  Laterality: Left;    TYMPANOPLASTY         Family History     None        Tobacco Use    Smoking status: Never Smoker    Smokeless tobacco: Never Used   Substance and Sexual Activity    Alcohol use: Not on file     Comment: 3 glasses of wine/ week    Drug use: Never    Sexual activity: Not on file     Review of Systems   Constitutional: Negative for chills and fever.   HENT: Negative for facial swelling and hearing loss.    Respiratory:  Negative for cough and shortness of breath.    Cardiovascular: Negative for leg swelling.   Gastrointestinal: Negative for nausea and vomiting.   Musculoskeletal: Negative for arthralgias and joint swelling.   Skin: Positive for wound. Negative for rash.   Psychiatric/Behavioral: Negative for agitation and confusion.     Objective:     Vital Signs (Most Recent):  Temp: 96.4 °F (35.8 °C) (02/20/20 1127)  Pulse: 72 (02/20/20 1127)  Resp: 18 (02/20/20 1127)  BP: (!) 162/76 (02/20/20 1127)  SpO2: 97 % (02/20/20 1127) Vital Signs (24h Range):  Temp:  [96.4 °F (35.8 °C)-98.4 °F (36.9 °C)] 96.4 °F (35.8 °C)  Pulse:  [69-82] 72  Resp:  [16-18] 18  SpO2:  [96 %-100 %] 97 %  BP: (153-170)/(73-77) 162/76     Weight: 79.4 kg (175 lb)  Body mass index is 28.25 kg/m².    Foot Exam    General  Orientation: alert and oriented to person, place, and time   Affect: appropriate       Right Foot/Ankle     Inspection and Palpation  Ecchymosis: none  Swelling: none     Neurovascular  Dorsalis pedis: 2+  Posterior tibial: 2+  Saphenous nerve sensation: diminished  Tibial nerve sensation: diminished  Superficial peroneal nerve sensation: diminished  Deep peroneal nerve sensation: diminished  Sural nerve sensation: diminished      Left Foot/Ankle      Inspection and Palpation  Ecchymosis: none    Neurovascular  Dorsalis pedis: 2+  Posterior tibial: 2+  Saphenous nerve sensation: diminished  Tibial nerve sensation: diminished  Superficial peroneal nerve sensation: diminished  Deep peroneal nerve sensation: diminished  Sural nerve sensation: diminished            Laboratory:  A1C:   Recent Labs   Lab 02/12/20  1928   HGBA1C 7.6*     CBC:   Recent Labs   Lab 02/20/20  0600   WBC 6.31   RBC 3.12*   HGB 9.3*   HCT 29.8*      MCV 96   MCH 29.8   MCHC 31.2*     CMP:   Recent Labs   Lab 02/17/20  0542  02/20/20  0600   *   < > 102   CALCIUM 9.2   < > 8.9   ALBUMIN 2.9*  --  2.7*   PROT 7.0  --   --       < > 142   K 3.7   < > 3.6    CO2 29   < > 30*      < > 106   BUN 9   < > 8   CREATININE 0.8   < > 0.7   ALKPHOS 73  --   --    ALT 10  --   --    AST 12  --   --    BILITOT 0.3  --   --     < > = values in this interval not displayed.     CRP:   No results for input(s): CRP in the last 168 hours.  ESR:   No results for input(s): SEDRATE in the last 168 hours.  Wound Cultures:   Recent Labs   Lab 02/13/20  1030 02/13/20 2046   LABAERO ESCHERICHIA COLI  Many  No other significant isolate  * ESCHERICHIA COLI  Few  *  ENTEROCOCCUS FAECALIS  Moderate  No other significant isolate  *     Microbiology Results (last 7 days)     Procedure Component Value Units Date/Time    Fungus culture [[id number]] Collected:  02/13/20 2046    Order Status:  Completed Specimen:  Wound from Foot, Left Updated:  02/20/20 1010     Fungus (Mycology) Culture Culture in progress    Fungus culture [[id number]] Collected:  02/13/20 2046    Order Status:  Completed Specimen:  Wound from Foot, Left Updated:  02/20/20 1010     Fungus (Mycology) Culture Culture in progress    Narrative:       Left foot wound    Culture, Anaerobic [[id number]]  (Abnormal) Collected:  02/13/20 2046    Order Status:  Completed Specimen:  Wound from Foot, Left Updated:  02/18/20 1153     Anaerobic Culture PORPHYROMONAS SOMERAE  Moderate      Narrative:       Left foot wound    Culture, Anaerobic [[id number]]  (Abnormal) Collected:  02/13/20 2046    Order Status:  Completed Specimen:  Wound from Foot, Left Updated:  02/18/20 1153     Anaerobic Culture PORPHYROMONAS SOMERAE  Many      Blood culture x two cultures. Draw prior to antibiotics [[id number]] Collected:  02/12/20 1918    Order Status:  Completed Specimen:  Blood from Peripheral, Hand, Right Updated:  02/17/20 2212     Blood Culture, Routine No growth after 5 days.    Narrative:       Aerobic and anaerobic    Blood culture x two cultures. Draw prior to antibiotics [[id number]] Collected:  02/12/20 1743    Order Status:  Completed Specimen:   Blood from Peripheral, Antecubital, Left Updated:  02/17/20 2012     Blood Culture, Routine No growth after 5 days.    Narrative:       Aerobic and anaerobic    Culture, Anaerobe [[id number]] Collected:  02/13/20 1030    Order Status:  Completed Specimen:  Wound from Foot, Left Updated:  02/17/20 1218     Anaerobic Culture No anaerobes isolated    Aerobic culture [[id number]]  (Abnormal)  (Susceptibility) Collected:  02/13/20 2046    Order Status:  Completed Specimen:  Wound from Foot, Left Updated:  02/17/20 1023     Aerobic Bacterial Culture ESCHERICHIA COLI  Few        ENTEROCOCCUS FAECALIS  Moderate  No other significant isolate      Narrative:       Left foot wound    Aerobic culture [[id number]]  (Abnormal)  (Susceptibility) Collected:  02/13/20 1030    Order Status:  Completed Specimen:  Wound from Foot, Left Updated:  02/15/20 1156     Aerobic Bacterial Culture ESCHERICHIA COLI  Many  No other significant isolate      AFB Culture & Smear [[id number]] Collected:  02/13/20 2046    Order Status:  Completed Specimen:  Wound from Foot, Left Updated:  02/15/20 0927     AFB Culture & Smear Culture in progress     AFB CULTURE STAIN No acid fast bacilli seen.    AFB Culture & Smear [[id number]] Collected:  02/13/20 2046    Order Status:  Completed Specimen:  Wound from Foot, Left Updated:  02/15/20 0927     AFB Culture & Smear Culture in progress     AFB CULTURE STAIN No acid fast bacilli seen.    Narrative:       Left foot wound    Gram stain [[id number]] Collected:  02/13/20 2046    Order Status:  Completed Specimen:  Wound from Foot, Left Updated:  02/13/20 2304     Gram Stain Result No WBC's      Rare Gram positive cocci    Narrative:       Left foot wound    Gram stain [[id number]] Collected:  02/13/20 2046    Order Status:  Completed Specimen:  Wound from Foot, Left Updated:  02/13/20 2256     Gram Stain Result Rare WBC's      Moderate Gram positive cocci    Aerobic culture [[id number]] Collected:  02/13/20 2046     Order Status:  Sent Specimen:  Wound from Foot, Left Updated:  02/13/20 2046    Gram stain [[id number]] Collected:  02/13/20 1030    Order Status:  Completed Specimen:  Wound Updated:  02/13/20 2029     Gram Stain Result Moderate WBC's      Many Gram positive cocci      Many Gram negative rods    Gram stain [[id number]]     Order Status:  Completed Specimen:  Wound from Foot, Left         Specimen (12h ago, onward)    None          Diagnostic Results:  Imaging Results           MRI Foot (Forefoot) Left W W/O Contrast (Final result)  Result time 02/13/20 01:26:44    Final result by Mirza Brown MD (02/13/20 01:26:44)                 Impression:      1.  Soft tissue irregularity along the dorsum/lateral aspect of the left forefoot with associated heterogeneous region of central non-enhancement concerning for abscess or necrotic tissue with dimensions as above.  There are scattered foci of susceptibility artifact throughout this region concerning for soft tissue gas.    2.  Abnormal marrow edema and enhancement involving the 5th metatarsal and phalanges as well as the proximal and middle phalanges of the 4th toe concerning for associated osteomyelitis.    This report was flagged in Epic as abnormal.      Electronically signed by: Mirza Brown MD  Date:    02/13/2020  Time:    01:26             Narrative:    EXAMINATION:  MRI FOOT (FOREFOOT) LEFT W W/O CONTRAST    CLINICAL HISTORY:  Osteomyelitis suspected, foot swelling, diabetic;    TECHNIQUE:  Multiplanar, multisequence imaging of the left forefoot was performed before and after the administration of 9 cc gadolinium based IV contrast.    COMPARISON:  Left foot radiograph series 02/12/2020    FINDINGS:  There is significant soft tissue irregularity/ulceration along the dorsum/lateral aspect of the forefoot.  There is a large region of nonenhancing soft tissue along the lateral aspect of the forefoot measuring approximately 2.7 x 7.6 cm concerning for abscess or  necrotic tissue (series 10, image 19).  This demonstrates heterogeneous T2 hyperintensity with scattered foci of susceptibility concerning for soft tissue gas.  There is abnormal marrow edema and enhancement involving the 5th metatarsal as well as the proximal, middle, and distal phalanges concerning for osteomyelitis.  Additional abnormal marrow edema and enhancement is present of the proximal and middle for phalanges of the 4th toe concerning for osteomyelitis.    There is diffuse edema of the plantar foot musculature.  No definite additional discrete collections identified.  There are mild degenerative changes of the midfoot.                                X-Ray Foot Complete Left (Final result)  Result time 02/12/20 20:17:22    Final result by Sharad Baker MD (02/12/20 20:17:22)                 Impression:      Osseous erosion involving the distal 5th metatarsal, proximal and middle phalanx of the 5th digit and proximal phalanx of the 4th digit.  There is soft tissue thickening lateral to the 5th metatarsal with probable soft tissue air.  Findings suggest cellulitis with possible abscess and osteomyelitis.  Recommend orthopedic follow-up.    This report was flagged in Epic as abnormal.      Electronically signed by: Sharad Baker  Date:    02/12/2020  Time:    20:17             Narrative:    EXAMINATION:  XR FOOT COMPLETE 3 VIEW LEFT    CLINICAL HISTORY:  .  Unspecified infectious disease    TECHNIQUE:  AP, lateral and oblique views of the left foot were performed.    COMPARISON:  None    FINDINGS:  There is mild erosion noted to the distal 5th metatarsal head and shaft.  Minimal rotation of the adjacent proximal portion of the proximal phalanx of the 5th digit also.  There is soft tissue edema and thickening laterally.  Probable air in the soft tissues laterally also.  Findings suggest cellulitis with possible abscess and osteomyelitis.  Recommend follow-up.    There is erosion of the midportion of the  proximal phalanx of the 4th digit also.  Limited visualization.    Mild erosion of the medial margin of the middle phalanx of the 5th digit also.                               X-Ray Chest 1 View (Final result)  Result time 02/12/20 18:37:00    Final result by Ramos Rushing MD (02/12/20 18:37:00)                 Impression:      No radiographic acute intrathoracic process seen.  Specifically, no focal consolidation.      Electronically signed by: Ramos Rushing MD  Date:    02/12/2020  Time:    18:37             Narrative:    EXAMINATION:  XR CHEST 1 VIEW    CLINICAL HISTORY:  Sepsis;    TECHNIQUE:  Single frontal view of the chest was performed.    COMPARISON:  None    FINDINGS:  Mild nonspecific elevation of the left hemidiaphragm.The lungs are clear, with normal appearance of pulmonary vasculature and no pleural effusion or pneumothorax.    The cardiac silhouette is normal in size. Suspected mild tortuosity of the thoracic aorta.  Hilar contours are within normal limits.    Bones are intact.  PA and lateral views can be obtained.                                  Clinical Findings:  Ulcer Location: Left lateral dorsal foot  Measurements:  Periwound: atrophic  Drainage:serous sanguinous  Base: granular  Signs of infection: No purulence, erythema, edema, or malodor    Partial skin breakdown of left plantar hallux.    Sutures intact    2/17          2/15        2/20/20        Assessment/Plan:     Chronic pain of left ankle  Ankle x-ray not showing fracture, but does show arthritis, discussed that once wound is healed will consider ankle brace and other conservative measures for ankle pain.      Acute osteomyelitis of toe of left foot   Ana Maria Souza is a 69 y.o. 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 Peterson).     Plan:  - VAC was removed in anticipation for DC to facility for replacement. Wound was packed lightly with aquacel and covered with a DSD.  - Continue local wound care.  -  Podiatry will follow.    Patient to be WBAT to heel for short distances in orthopedic shoe    DC Instructions:  Patient is to follow up with podiatry within 10 days of discharge.  Call [phone]  to make an appointment.  Home health/facility to do dressing changes every 2-3 days as follows:  Rinse with saline, pat dry, apply wound vac on 75mmHg slow continuous therapy.  Dress with 2 rolls cast padding and flexinet.            Type 2 diabetes mellitus, without long-term current use of insulin  A1c 7.6  Per primary          Everette Mota MD  Podiatry  [facility]

## 2020-02-20 NOTE — PLAN OF CARE
Pt is AAOX4,VSS. Pt is progressing towards plan of care goals. Pain management has been assessed. Pt reports pain at a 0. Pain reassessed throughout shift. SCDs in place with frequent checks for skin breakdown. PICC in place, flushed. Wound vac drainage assessed, and output documented. LLE wrapped and boot on. Fall precautions in place, no report of falls. Safety measures are in place bed in lowest position, wheels locked, call light within reach. Will continue to monitor.

## 2020-02-20 NOTE — SUBJECTIVE & OBJECTIVE
Interval Hx:    Patient seen at bedside today for wound vac removal in anticipation for discharge to facility to have it replaced there. She is doing well today.    Scheduled Meds:   enoxaparin  40 mg Subcutaneous Daily    lisinopril  40 mg Oral Daily    piperacillin-tazobactam (ZOSYN) IVPB  4.5 g Intravenous Q8H    senna-docusate 8.6-50 mg  1 tablet Oral Daily    sodium chloride 0.9%  10 mL Intravenous Q6H     Continuous Infusions:    PRN Meds:acetaminophen, bisacodyL, Dextrose 10% Bolus, Dextrose 10% Bolus, Dextrose 10% Bolus, Dextrose 10% Bolus, glucagon (human recombinant), glucose, glucose, hydrALAZINE, HYDROcodone-acetaminophen, insulin aspart U-100, melatonin, ondansetron, promethazine (PHENERGAN) IVPB, senna-docusate 8.6-50 mg, [place] PICC Protocol **AND** sodium chloride 0.9% **AND** sodium chloride 0.9%, sodium chloride 0.9%, DIPH,PERTUS (ADACEL),TETANUS PF VAC (ADULT)    Review of patient's allergies indicates:  No Known Allergies     History reviewed. No pertinent past medical history.  Past Surgical History:   Procedure Laterality Date    INCISION AND DRAINAGE FOOT Left 2/13/2020    Procedure: INCISION AND DRAINAGE, FOOT;  Surgeon: Mau Peterson DPM;  Location: [facility] OR [address] [place] [address];  Service: Podiatry;  Laterality: Left;    TYMPANOPLASTY         Family History     None        Tobacco Use    Smoking status: Never Smoker    Smokeless tobacco: Never Used   Substance and Sexual Activity    Alcohol use: Not on file     Comment: 3 glasses of wine/ week    Drug use: Never    Sexual activity: Not on file     Review of Systems   Constitutional: Negative for chills and fever.   HENT: Negative for facial swelling and hearing loss.    Respiratory: Negative for cough and shortness of breath.    Cardiovascular: Negative for leg swelling.   Gastrointestinal: Negative for nausea and vomiting.   Musculoskeletal: Negative for arthralgias and joint swelling.   Skin: Positive for wound. Negative for rash.    Psychiatric/Behavioral: Negative for agitation and confusion.     Objective:     Vital Signs (Most Recent):  Temp: 96.4 °F (35.8 °C) (02/20/20 1127)  Pulse: 72 (02/20/20 1127)  Resp: 18 (02/20/20 1127)  BP: (!) 162/76 (02/20/20 1127)  SpO2: 97 % (02/20/20 1127) Vital Signs (24h Range):  Temp:  [96.4 °F (35.8 °C)-98.4 °F (36.9 °C)] 96.4 °F (35.8 °C)  Pulse:  [69-82] 72  Resp:  [16-18] 18  SpO2:  [96 %-100 %] 97 %  BP: (153-170)/(73-77) 162/76     Weight: 79.4 kg (175 lb)  Body mass index is 28.25 kg/m².    Foot Exam    General  Orientation: alert and oriented to person, place, and time   Affect: appropriate       Right Foot/Ankle     Inspection and Palpation  Ecchymosis: none  Swelling: none     Neurovascular  Dorsalis pedis: 2+  Posterior tibial: 2+  Saphenous nerve sensation: diminished  Tibial nerve sensation: diminished  Superficial peroneal nerve sensation: diminished  Deep peroneal nerve sensation: diminished  Sural nerve sensation: diminished      Left Foot/Ankle      Inspection and Palpation  Ecchymosis: none    Neurovascular  Dorsalis pedis: 2+  Posterior tibial: 2+  Saphenous nerve sensation: diminished  Tibial nerve sensation: diminished  Superficial peroneal nerve sensation: diminished  Deep peroneal nerve sensation: diminished  Sural nerve sensation: diminished            Laboratory:  A1C:   Recent Labs   Lab 02/12/20  1928   HGBA1C 7.6*     CBC:   Recent Labs   Lab 02/20/20  0600   WBC 6.31   RBC 3.12*   HGB 9.3*   HCT 29.8*      MCV 96   MCH 29.8   MCHC 31.2*     CMP:   Recent Labs   Lab 02/17/20  0542  02/20/20  0600   *   < > 102   CALCIUM 9.2   < > 8.9   ALBUMIN 2.9*  --  2.7*   PROT 7.0  --   --       < > 142   K 3.7   < > 3.6   CO2 29   < > 30*      < > 106   BUN 9   < > 8   CREATININE 0.8   < > 0.7   ALKPHOS 73  --   --    ALT 10  --   --    AST 12  --   --    BILITOT 0.3  --   --     < > = values in this interval not displayed.     CRP:   No results for input(s): CRP  in the last 168 hours.  ESR:   No results for input(s): SEDRATE in the last 168 hours.  Wound Cultures:   Recent Labs   Lab 02/13/20  1030 02/13/20 2046   LABAERO ESCHERICHIA COLI  Many  No other significant isolate  * ESCHERICHIA COLI  Few  *  ENTEROCOCCUS FAECALIS  Moderate  No other significant isolate  *     Microbiology Results (last 7 days)     Procedure Component Value Units Date/Time    Fungus culture [[id number]] Collected:  02/13/20 2046    Order Status:  Completed Specimen:  Wound from Foot, Left Updated:  02/20/20 1010     Fungus (Mycology) Culture Culture in progress    Fungus culture [[id number]] Collected:  02/13/20 2046    Order Status:  Completed Specimen:  Wound from Foot, Left Updated:  02/20/20 1010     Fungus (Mycology) Culture Culture in progress    Narrative:       Left foot wound    Culture, Anaerobic [[id number]]  (Abnormal) Collected:  02/13/20 2046    Order Status:  Completed Specimen:  Wound from Foot, Left Updated:  02/18/20 1153     Anaerobic Culture PORPHYROMONAS SOMERAE  Moderate      Narrative:       Left foot wound    Culture, Anaerobic [[id number]]  (Abnormal) Collected:  02/13/20 2046    Order Status:  Completed Specimen:  Wound from Foot, Left Updated:  02/18/20 1153     Anaerobic Culture PORPHYROMONAS SOMERAE  Many      Blood culture x two cultures. Draw prior to antibiotics [[id number]] Collected:  02/12/20 1918    Order Status:  Completed Specimen:  Blood from Peripheral, Hand, Right Updated:  02/17/20 2212     Blood Culture, Routine No growth after 5 days.    Narrative:       Aerobic and anaerobic    Blood culture x two cultures. Draw prior to antibiotics [[id number]] Collected:  02/12/20 1743    Order Status:  Completed Specimen:  Blood from Peripheral, Antecubital, Left Updated:  02/17/20 2012     Blood Culture, Routine No growth after 5 days.    Narrative:       Aerobic and anaerobic    Culture, Anaerobe [[id number]] Collected:  02/13/20 1030    Order Status:  Completed  Specimen:  Wound from Foot, Left Updated:  02/17/20 1218     Anaerobic Culture No anaerobes isolated    Aerobic culture [[id number]]  (Abnormal)  (Susceptibility) Collected:  02/13/20 2046    Order Status:  Completed Specimen:  Wound from Foot, Left Updated:  02/17/20 1023     Aerobic Bacterial Culture ESCHERICHIA COLI  Few        ENTEROCOCCUS FAECALIS  Moderate  No other significant isolate      Narrative:       Left foot wound    Aerobic culture [[id number]]  (Abnormal)  (Susceptibility) Collected:  02/13/20 1030    Order Status:  Completed Specimen:  Wound from Foot, Left Updated:  02/15/20 1156     Aerobic Bacterial Culture ESCHERICHIA COLI  Many  No other significant isolate      AFB Culture & Smear [[id number]] Collected:  02/13/20 2046    Order Status:  Completed Specimen:  Wound from Foot, Left Updated:  02/15/20 0927     AFB Culture & Smear Culture in progress     AFB CULTURE STAIN No acid fast bacilli seen.    AFB Culture & Smear [[id number]] Collected:  02/13/20 2046    Order Status:  Completed Specimen:  Wound from Foot, Left Updated:  02/15/20 0927     AFB Culture & Smear Culture in progress     AFB CULTURE STAIN No acid fast bacilli seen.    Narrative:       Left foot wound    Gram stain [[id number]] Collected:  02/13/20 2046    Order Status:  Completed Specimen:  Wound from Foot, Left Updated:  02/13/20 2304     Gram Stain Result No WBC's      Rare Gram positive cocci    Narrative:       Left foot wound    Gram stain [[id number]] Collected:  02/13/20 2046    Order Status:  Completed Specimen:  Wound from Foot, Left Updated:  02/13/20 2256     Gram Stain Result Rare WBC's      Moderate Gram positive cocci    Aerobic culture [[id number]] Collected:  02/13/20 2046    Order Status:  Sent Specimen:  Wound from Foot, Left Updated:  02/13/20 2046    Gram stain [[id number]] Collected:  02/13/20 1030    Order Status:  Completed Specimen:  Wound Updated:  02/13/20 2029     Gram Stain Result Moderate WBC's      Many  Gram positive cocci      Many Gram negative rods    Gram stain [[id number]]     Order Status:  Completed Specimen:  Wound from Foot, Left         Specimen (12h ago, onward)    None          Diagnostic Results:  Imaging Results           MRI Foot (Forefoot) Left W W/O Contrast (Final result)  Result time 02/13/20 01:26:44    Final result by Mirza Brown MD (02/13/20 01:26:44)                 Impression:      1.  Soft tissue irregularity along the dorsum/lateral aspect of the left forefoot with associated heterogeneous region of central non-enhancement concerning for abscess or necrotic tissue with dimensions as above.  There are scattered foci of susceptibility artifact throughout this region concerning for soft tissue gas.    2.  Abnormal marrow edema and enhancement involving the 5th metatarsal and phalanges as well as the proximal and middle phalanges of the 4th toe concerning for associated osteomyelitis.    This report was flagged in Epic as abnormal.      Electronically signed by: Mirza Brown MD  Date:    02/13/2020  Time:    01:26             Narrative:    EXAMINATION:  MRI FOOT (FOREFOOT) LEFT W W/O CONTRAST    CLINICAL HISTORY:  Osteomyelitis suspected, foot swelling, diabetic;    TECHNIQUE:  Multiplanar, multisequence imaging of the left forefoot was performed before and after the administration of 9 cc gadolinium based IV contrast.    COMPARISON:  Left foot radiograph series 02/12/2020    FINDINGS:  There is significant soft tissue irregularity/ulceration along the dorsum/lateral aspect of the forefoot.  There is a large region of nonenhancing soft tissue along the lateral aspect of the forefoot measuring approximately 2.7 x 7.6 cm concerning for abscess or necrotic tissue (series 10, image 19).  This demonstrates heterogeneous T2 hyperintensity with scattered foci of susceptibility concerning for soft tissue gas.  There is abnormal marrow edema and enhancement involving the 5th metatarsal as well  as the proximal, middle, and distal phalanges concerning for osteomyelitis.  Additional abnormal marrow edema and enhancement is present of the proximal and middle for phalanges of the 4th toe concerning for osteomyelitis.    There is diffuse edema of the plantar foot musculature.  No definite additional discrete collections identified.  There are mild degenerative changes of the midfoot.                                X-Ray Foot Complete Left (Final result)  Result time 02/12/20 20:17:22    Final result by Sharad Baker MD (02/12/20 20:17:22)                 Impression:      Osseous erosion involving the distal 5th metatarsal, proximal and middle phalanx of the 5th digit and proximal phalanx of the 4th digit.  There is soft tissue thickening lateral to the 5th metatarsal with probable soft tissue air.  Findings suggest cellulitis with possible abscess and osteomyelitis.  Recommend orthopedic follow-up.    This report was flagged in Epic as abnormal.      Electronically signed by: Sharad Baker  Date:    02/12/2020  Time:    20:17             Narrative:    EXAMINATION:  XR FOOT COMPLETE 3 VIEW LEFT    CLINICAL HISTORY:  .  Unspecified infectious disease    TECHNIQUE:  AP, lateral and oblique views of the left foot were performed.    COMPARISON:  None    FINDINGS:  There is mild erosion noted to the distal 5th metatarsal head and shaft.  Minimal rotation of the adjacent proximal portion of the proximal phalanx of the 5th digit also.  There is soft tissue edema and thickening laterally.  Probable air in the soft tissues laterally also.  Findings suggest cellulitis with possible abscess and osteomyelitis.  Recommend follow-up.    There is erosion of the midportion of the proximal phalanx of the 4th digit also.  Limited visualization.    Mild erosion of the medial margin of the middle phalanx of the 5th digit also.                               X-Ray Chest 1 View (Final result)  Result time 02/12/20 18:37:00    Final  result by Ramos Rushing MD (02/12/20 18:37:00)                 Impression:      No radiographic acute intrathoracic process seen.  Specifically, no focal consolidation.      Electronically signed by: Ramos Rushing MD  Date:    02/12/2020  Time:    18:37             Narrative:    EXAMINATION:  XR CHEST 1 VIEW    CLINICAL HISTORY:  Sepsis;    TECHNIQUE:  Single frontal view of the chest was performed.    COMPARISON:  None    FINDINGS:  Mild nonspecific elevation of the left hemidiaphragm.The lungs are clear, with normal appearance of pulmonary vasculature and no pleural effusion or pneumothorax.    The cardiac silhouette is normal in size. Suspected mild tortuosity of the thoracic aorta.  Hilar contours are within normal limits.    Bones are intact.  PA and lateral views can be obtained.                                  Clinical Findings:  Ulcer Location: Left lateral dorsal foot  Measurements:  Periwound: atrophic  Drainage:serous sanguinous  Base: granular  Signs of infection: No purulence, erythema, edema, or malodor    Partial skin breakdown of left plantar hallux.    Sutures intact    2/17          2/15        2/20/20

## 2020-02-21 LAB
ALBUMIN SERPL BCP-MCNC: 2.9 G/DL (ref 3.5–5.2)
ANION GAP SERPL CALC-SCNC: 7 MMOL/L (ref 8–16)
BASOPHILS # BLD AUTO: 0.03 K/UL (ref 0–0.2)
BASOPHILS NFR BLD: 0.7 % (ref 0–1.9)
BUN SERPL-MCNC: 11 MG/DL (ref 8–23)
CALCIUM SERPL-MCNC: 9.2 MG/DL (ref 8.7–10.5)
CHLORIDE SERPL-SCNC: 106 MMOL/L (ref 95–110)
CO2 SERPL-SCNC: 28 MMOL/L (ref 23–29)
CREAT SERPL-MCNC: 0.8 MG/DL (ref 0.5–1.4)
DIFFERENTIAL METHOD: ABNORMAL
EOSINOPHIL # BLD AUTO: 0.2 K/UL (ref 0–0.5)
EOSINOPHIL NFR BLD: 4.8 % (ref 0–8)
ERYTHROCYTE [DISTWIDTH] IN BLOOD BY AUTOMATED COUNT: 14 % (ref 11.5–14.5)
EST. GFR  (AFRICAN AMERICAN): >60 ML/MIN/1.73 M^2
EST. GFR  (NON AFRICAN AMERICAN): >60 ML/MIN/1.73 M^2
GLUCOSE SERPL-MCNC: 117 MG/DL (ref 70–110)
HCT VFR BLD AUTO: 42.5 % (ref 37–48.5)
HGB BLD-MCNC: 13.3 G/DL (ref 12–16)
IMM GRANULOCYTES # BLD AUTO: 0.02 K/UL (ref 0–0.04)
IMM GRANULOCYTES NFR BLD AUTO: 0.4 % (ref 0–0.5)
LYMPHOCYTES # BLD AUTO: 1.7 K/UL (ref 1–4.8)
LYMPHOCYTES NFR BLD: 36.4 % (ref 18–48)
MCH RBC QN AUTO: 30.1 PG (ref 27–31)
MCHC RBC AUTO-ENTMCNC: 31.3 G/DL (ref 32–36)
MCV RBC AUTO: 96 FL (ref 82–98)
MONOCYTES # BLD AUTO: 0.4 K/UL (ref 0.3–1)
MONOCYTES NFR BLD: 8.7 % (ref 4–15)
NEUTROPHILS # BLD AUTO: 2.3 K/UL (ref 1.8–7.7)
NEUTROPHILS NFR BLD: 49 % (ref 38–73)
NRBC BLD-RTO: 0 /100 WBC
PHOSPHATE SERPL-MCNC: 3.3 MG/DL (ref 2.7–4.5)
PLATELET # BLD AUTO: 196 K/UL (ref 150–350)
PMV BLD AUTO: 9.6 FL (ref 9.2–12.9)
POCT GLUCOSE: 114 MG/DL (ref 70–110)
POCT GLUCOSE: 153 MG/DL (ref 70–110)
POCT GLUCOSE: 191 MG/DL (ref 70–110)
POCT GLUCOSE: 202 MG/DL (ref 70–110)
POTASSIUM SERPL-SCNC: 3.8 MMOL/L (ref 3.5–5.1)
RBC # BLD AUTO: 4.42 M/UL (ref 4–5.4)
SODIUM SERPL-SCNC: 141 MMOL/L (ref 136–145)
WBC # BLD AUTO: 4.59 K/UL (ref 3.9–12.7)

## 2020-02-21 PROCEDURE — 97530 THERAPEUTIC ACTIVITIES: CPT

## 2020-02-21 PROCEDURE — 97116 GAIT TRAINING THERAPY: CPT

## 2020-02-21 PROCEDURE — 25000003 PHARM REV CODE 250: Performed by: HOSPITALIST

## 2020-02-21 PROCEDURE — A4216 STERILE WATER/SALINE, 10 ML: HCPCS | Performed by: HOSPITALIST

## 2020-02-21 PROCEDURE — 11000001 HC ACUTE MED/SURG PRIVATE ROOM

## 2020-02-21 PROCEDURE — 80069 RENAL FUNCTION PANEL: CPT

## 2020-02-21 PROCEDURE — 63600175 PHARM REV CODE 636 W HCPCS: Performed by: NURSE PRACTITIONER

## 2020-02-21 PROCEDURE — 99232 PR SUBSEQUENT HOSPITAL CARE,LEVL II: ICD-10-PCS | Mod: ,,, | Performed by: HOSPITALIST

## 2020-02-21 PROCEDURE — 63600175 PHARM REV CODE 636 W HCPCS: Performed by: HOSPITALIST

## 2020-02-21 PROCEDURE — 85025 COMPLETE CBC W/AUTO DIFF WBC: CPT

## 2020-02-21 PROCEDURE — 99232 SBSQ HOSP IP/OBS MODERATE 35: CPT | Mod: ,,, | Performed by: HOSPITALIST

## 2020-02-21 RX ORDER — IBUPROFEN 400 MG/1
400 TABLET ORAL EVERY 6 HOURS PRN
Status: DISCONTINUED | OUTPATIENT
Start: 2020-02-21 | End: 2020-02-23

## 2020-02-21 RX ORDER — IBUPROFEN 400 MG/1
400 TABLET ORAL EVERY 6 HOURS PRN
Start: 2020-02-21 | End: 2020-02-24 | Stop reason: HOSPADM

## 2020-02-21 RX ADMIN — PIPERACILLIN AND TAZOBACTAM 4.5 G: 4; .5 INJECTION, POWDER, LYOPHILIZED, FOR SOLUTION INTRAVENOUS; PARENTERAL at 02:02

## 2020-02-21 RX ADMIN — Medication 10 ML: at 12:02

## 2020-02-21 RX ADMIN — Medication 10 ML: at 06:02

## 2020-02-21 RX ADMIN — PIPERACILLIN AND TAZOBACTAM 4.5 G: 4; .5 INJECTION, POWDER, LYOPHILIZED, FOR SOLUTION INTRAVENOUS; PARENTERAL at 09:02

## 2020-02-21 RX ADMIN — ACETAMINOPHEN 650 MG: 325 TABLET ORAL at 09:02

## 2020-02-21 RX ADMIN — ENOXAPARIN SODIUM 40 MG: 100 INJECTION SUBCUTANEOUS at 05:02

## 2020-02-21 RX ADMIN — IBUPROFEN 400 MG: 400 TABLET, FILM COATED ORAL at 09:02

## 2020-02-21 RX ADMIN — PIPERACILLIN AND TAZOBACTAM 4.5 G: 4; .5 INJECTION, POWDER, LYOPHILIZED, FOR SOLUTION INTRAVENOUS; PARENTERAL at 05:02

## 2020-02-21 RX ADMIN — DOCUSATE SODIUM - SENNOSIDES 1 TABLET: 50; 8.6 TABLET, FILM COATED ORAL at 09:02

## 2020-02-21 NOTE — PLAN OF CARE
Problem: Physical Therapy Goal  Goal: Physical Therapy Goal  Description  Goals to be met by: 20     Patient will increase functional independence with mobility by performin. Sit to stand transfer with Modified [place].  2. Bed to chair transfer with Modified [place] using Quad Cane.  3. Gait  x 100 feet with CGA using Quad Cane. - Cancelled  secondary to WB status  4. Ascend/descend 2 stair with bilateral Handrails Contact Guard Assistance using Quad Cane. - Cancelled  secondary to WB status  5. Lower extremity exercise program x 20 reps per handout, with assistance as needed.  6. Patient will ambulated 10 ft with CGA using quad cane and heel only WB.  - met   7. Patient will propel wheelchair 150ft with SBA using Bilateral UE and R LE - updated 20  8. Patient to ambulate 50 ft with Rolling Walker with SBA maintaining RLE weightbearing through heel only -updated 20   Outcome: Ongoing, Progressing      Patient agreeable to using RW today for gait training in order to maintain weight bearing precautions. Extensive education provided on implications for weightbearing status and risk associated with disregarding weightbearing status.     Stacy Gamez, PT, DPT  2020

## 2020-02-21 NOTE — PROGRESS NOTES
[facility] Hospital Medicine  Progress Note    Patient Name: Ana Maria Souza  MRN: [id number]  Patient Class: IP- Inpatient   Admission Date: 2/12/2020  Length of Stay: 9 days  Attending Physician: Reyna Tenorio MD  Primary Care Provider: [facility] Medicine Team: [facility] HOSP MED K Bee Jasso MD    Subjective:     Principal Problem:Diabetic foot infection    Interval History: pain controlled mostly, discussed need darcot boot to a mbulate as she asked about other methods to walk or repeting imaging but discussed only short distances recommended now per podiatry and with that offloading shoe. Left upper AC fossa with occlusive superficial thrombophlebitis, discussed with patient, added ibuprofen for infllamatory properties as Cr okay and discussed with her to alternate this with tylenol as that isnt anti inflamma for pain and has heating pad on it now and to use this and elevate, less swollen and tense today, if not improved in 7-10 days would cosider repeat U/S per occlusive superifical t.p recs but looks better today already. Likely from IV stick or line we discussed. Awaiting SNF now, working on case agreement as has not very many in network SNFs now. Also discussed with her the ortho referral for the left knee OA and would do PT/OT in interim, she asked about brace and if needed for stability PT can recommend but no fx so not needed per an ortho rec or consult at this time    Review of Systems   Constitutional: Positive for chills and fatigue. Negative for fever.   HENT: Negative for congestion and trouble swallowing.    Eyes: Negative for discharge and itching.   Respiratory: Negative for apnea, cough, chest tightness and shortness of breath.    Cardiovascular: Negative for chest pain and leg swelling.   Gastrointestinal: Negative for abdominal distention and abdominal pain.   Endocrine: Negative for cold intolerance, polydipsia and polyphagia.    Genitourinary: Negative for difficulty urinating, dysuria and flank pain.   Musculoskeletal: Negative for arthralgias and back pain.   Neurological: Negative for dizziness, syncope, facial asymmetry and light-headedness.   Hematological: Does not bruise/bleed easily.   Psychiatric/Behavioral: Negative for decreased concentration and hallucinations.     Objective:     Vital Signs (Most Recent):  Temp: 96.9 °F (36.1 °C) (02/21/20 1214)  Pulse: 68 (02/21/20 1214)  Resp: 18 (02/21/20 1214)  BP: (!) 159/67 (02/21/20 1214)  SpO2: 95 % (02/21/20 1214) Vital Signs (24h Range):  Temp:  [96.9 °F (36.1 °C)-98.5 °F (36.9 °C)] 96.9 °F (36.1 °C)  Pulse:  [68-92] 68  Resp:  [14-18] 18  SpO2:  [95 %-99 %] 95 %  BP: (143-166)/(67-83) 159/67     Weight: 79.4 kg (175 lb)  Body mass index is 28.25 kg/m².    Intake/Output Summary (Last 24 hours) at 2/21/2020 1506  Last data filed at 2/21/2020 1500  Gross per 24 hour   Intake 780 ml   Output --   Net 780 ml      Constitutional: Appears well developed and well nourished.    Head: Normocephalic and atraumatic.   Mouth/Throat: Oropharynx is clear and moist.   Eyes: EOM are normal. Pupils are equal, round, and reactive to light. No scleral icterus.   Neck: Normal range of motion. Neck supple.   Cardiovascular: Normal heart rate.  Regular heart rhythm.  No murmur heard.  Pulmonary/Chest: Effort normal. No respiratory distress. No wheezes, rales, or rhonchi  Abdominal: Soft. Bowel sounds are normal.  No distension.  No tenderness  Musculoskeletal: Normal range of motion. Left foot wrapped. darcot shoe now.   Neurological: Alert and oriented to person, place, and time.   Skin: Skin is warm and dry. Swelling in left antecubital fossa, pulses present, arm warm, mild improvement  Psychiatric: Normal mood and affect. Behavior is normal.          Overview/Hospital Course:   History of Present Illness:     Ms. Ana Maria Souza is a 69 y.o. female with T2DM not on medication, who presents to the  ED for evaluation of a foul smelling left foot wound.  She mentons that about 3 months prior to admission, she stepped on a nail.  She didn't seek medical care at that time, but she developed a small found on the dorsum of her left foot.  She started to use OTC creams about 2 weeks ago when she saw that the wound was not healing and seemed to be opening more.  She was using Silver Sulfadiazine and Ammonia Acetate creams, and the wound began to harden with a thick eschar over the next few days.  She then pulled off the eschar, and noticed white purulent material was produced along with a foul smelling odor.  Over time, she has noticed some swelling and erythema extending to her left leg.  She decided to wait and go to the ER once her Medicaid was finalized.  She denies any fevers or chills.  She denies any difficulty with ambulation.  She has been taking Tylenol and Tylenol Extra Strength for pain.     Past Medical History: Patient has no past medical history on file.        Past Surgical History: Patient has a past surgical history that includes Tympanoplasty.        Social History: Patient reports that she has never smoked. She has never used smokeless tobacco. She reports that she does not use drugs.        Family History: Patient's family history is not on file.     Significant Labs:   A1C:   Recent Labs   Lab 02/12/20  1928   HGBA1C 7.6*     CBC:   Recent Labs   Lab 02/20/20  0600 02/21/20  0515   WBC 6.31 4.59   HGB 9.3* 13.3   HCT 29.8* 42.5    196       Significant Imaging: I have reviewed all pertinent imaging results/findings within the past 24 hours.  MRI with gas and abscess of foot. Left side    Assessment/Plan:      Active Diagnoses:    Diagnosis Date Noted POA    PRINCIPAL PROBLEM:  Diabetic foot infection [E11.628, L08.9] 02/12/2020 Yes    Acute osteomyelitis of toe of left foot [M86.172] 02/13/2020 Unknown    Chronic pain of left ankle [M25.572, G89.29] 02/13/2020 Unknown    Type 2  diabetes mellitus, without long-term current use of insulin [E11.9] 02/12/2020 Yes    Essential hypertension [I10] 02/12/2020 Yes             Diabetic Foot Wound  · Afebrile and without leukocytosis.  ESR 88, CRP 7  · HbA1c 7.6  · MRI with left forefoot with associated heterogeneous region of central non-enhancement concerning for abscess or necrotic tissue with dimensions as above.  There are scattered foci of susceptibility artifact throughout this region concerning for soft tissue gas. Abnormal marrow edema and enhancement involving the 5th metatarsal and phalanges as well as the proximal and middle phalanges of the 4th toe concerning for associated osteomyelitis.  · Podiatry consulted, appreciate assistance. I/D on 2/13.   · ID consulted, appreciate assistance.  ESCHERICHIA COLI and ENTEROCOCCUS FAECALIS.  · -recs for zosyn unti 3/26 for 6 weeks, has ID follow up on 35, weekly labs faxed to ID clinic.  · Wound vac in place, darco boot onm, emphasized heel walking to patient. Wound vac orders in place for SNF per podiatry recs. Need 10 day follow up with podiatry.  · Walker ordered   · Needs vascular evaluation but patient currently refuses.      Essential Hypertension  · Chronic issue but not on medication at home  · Cont lisinopril  ·   Type 2 DM without Long Term Insulin Use  · HbA1c 7.6  · Not on medication at home  · SSI with POCT accuchecks and Diabetic diet  · glucose mostly 180s  · Has new pcp on 3/25 follow up with glucose log to titrate prn    Knee Osteoarthriis  -severe on left on xrays, causes walking issues at home  -amb ref to ortho, no effusions  -cont PT/OT and will have this at SNF in interim also      Left antecubital fossa superficial thrombophlebitis  -present on exam, likely from infusion or needlestick  -elevate, warm compresses, will self resolve with supportive care in interim  -U/S confirmation with occlusive superficial thrombophlebitis seen. Improving mildly today 2/21 from prior with  heat, elevation. Added ibuprofen prn. If not improved, guideilnes rec repeat U/S in 7-10 days to reassess.         Diet:  diabetic   VTE PPx:  Ambulatory  Goals of Care:  Full       Disposition:  pending snf now,   Needs poodiatry f/u scheduled.   Ortho outpatient ref placed  Has ID and PCP in place    Reyna Tenorio MD  Department of Hospital Medicine   [facility]

## 2020-02-21 NOTE — PT/OT/SLP PROGRESS
"Physical Therapy Treatment    Patient Name:  Ana Maria Souza   MRN:  [id number]    Recommendations:     Discharge Recommendations:  nursing facility, skilled   Discharge Equipment Recommendations: wheelchair   Barriers to discharge: Inaccessible home and Decreased caregiver support    Assessment:     Ana Maria Souza is a 69 y.o. female admitted with a medical diagnosis of Diabetic foot infection.  She presents with the following impairments/functional limitations:  weakness, impaired self care skills, impaired functional mobilty, gait instability, impaired balance, decreased lower extremity function, decreased safety awareness, pain, orthopedic precautions, impaired joint extensibility Patient pleasant and motivated to work with therapy but with decreased safety awareness and decreased incite to deficits. Increased time spent educating patient on risks associated with diabetic foot infection and not adhering to weightbearing status. Gait training with QC and RW today with maximal verbal cueing. Patient is not safe to return home when medically ready at current level of mobility and would benefit from skilled nursing facility to improve functional mobility and safety.      Rehab Prognosis: Fair; patient would benefit from acute skilled PT services to address these deficits and reach maximum level of function.    Recent Surgery: Procedure(s) (LRB):  INCISION AND DRAINAGE, FOOT (Left) 8 Days Post-Op    Plan:     During this hospitalization, patient to be seen 5 x/week to address the identified rehab impairments via gait training, therapeutic activities, therapeutic exercises, neuromuscular re-education, wheelchair management/training and progress toward the following goals:    · Plan of Care Expires:  03/14/20    Subjective     Chief Complaint: decreased mobility  Patient/Family Comments/goals: "Ya'll act like everyone is an old lady. I am telling ya'll I am on the go"  Pain/Comfort:  · Pain Rating 1: 0/10  · Pain " Rating Post-Intervention 1: 0/10      Objective:     Communicated with nurse prior to session.  Patient found sitting up in bed with telemetry upon PT entry to room.     General Precautions: Standard, fall, diabetic   Orthopedic Precautions:LLE partial weight bearing(through the heel only )   Braces: (offloading shoe)     Functional Mobility:  · Bed Mobility:     · Scooting: modified independence  · Supine to Sit: modified independence  · Sit to Supine: modified independence  · Transfers:     · Sit to Stand:  stand by assistance with rolling walker and quad cane  · Bed to Chair: stand by assistance with  rolling walker and quad cane  using  Step Transfer  · Toilet Transfer: stand by assistance with  rolling walker  using  Step Transfer  · Gait: 60' + 50' w/ RW, CGA-Benito sitting rest break in between. 1st trial patient maintaining weight bearing status 25-50% of the time despite maximal v/t cues. Benito for walker management, turns, to avoid door frames. 2nd trial After rest break and prolonged discussion on weightbearing status patient demonstrated understanding and maintain weightbearing status % of the time.      AM-PAC 6 CLICK MOBILITY  Turning over in bed (including adjusting bedclothes, sheets and blankets)?: 4  Sitting down on and standing up from a chair with arms (e.g., wheelchair, bedside commode, etc.): 3  Moving from lying on back to sitting on the side of the bed?: 4  Moving to and from a bed to a chair (including a wheelchair)?: 3  Need to walk in hospital room?: 3  Climbing 3-5 steps with a railing?: 2  Basic Mobility Total Score: 19       Therapeutic Activities and Exercises:   Pt educated on importance of OOB activity to decrease the risks associated with bed rest. Increased time spend listening to patients questions and concerns with regards to her mobility. Increased time spent educating patient on risks associated with diabetic foot infection,implication for non weight bearing status and risks  associated with no adhering to them. Patient seemed to understand and demonstrated improvement with 2nd gait trial but further review required. Instruction provided for safety and technique for gait and transfers with RW.  Pt educated on plan and goals with physical therapy.     Patient left HOB elevated with all lines intact and call button in reach..    GOALS:   Multidisciplinary Problems     Physical Therapy Goals        Problem: Physical Therapy Goal    Goal Priority Disciplines Outcome Goal Variances Interventions   Physical Therapy Goal     PT, PT/OT Ongoing, Progressing     Description:  Goals to be met by: 20     Patient will increase functional independence with mobility by performin. Sit to stand transfer with Modified [place].  2. Bed to chair transfer with Modified [place] using Quad Cane.  3. Gait  x 100 feet with CGA using Quad Cane. - Cancelled  secondary to WB status  4. Ascend/descend 2 stair with bilateral Handrails Contact Guard Assistance using Quad Cane. - Cancelled  secondary to WB status  5. Lower extremity exercise program x 20 reps per handout, with assistance as needed.  6. Patient will ambulated 10 ft with CGA using quad cane and heel only WB.  - met   7. Patient will propel wheelchair 150ft with SBA using Bilateral UE and R LE - updated 20  8. Patient to ambulate 50 ft with Rolling Walker with SBA maintaining RLE weightbearing through heel only -updated 20                    Time Tracking:     PT Received On: 20  PT Start Time: 1514     PT Stop Time: 1556  PT Total Time (min): 42 min     Billable Minutes: Gait Training 32 and Therapeutic Activity 20    Treatment Type: Treatment  PT/PTA: PT     PTA Visit Number: 0     Stacy Gamez, PT  2020

## 2020-02-21 NOTE — PROGRESS NOTES
"[facility]  Adult Nutrition  Progress Note    SUMMARY       Recommendations    1. Continue diabetic diet as tolerated.    Pt with good po intake.     2. Recommend adding Jakub for wound healing.    3. RD following.     Goals: continue to consume >75% of all meals by RD follow-up  Nutrition Goal Status: new  Communication of RD Recs: (POC)    Reason for Assessment    Reason For Assessment: length of stay  Diagnosis: (Diabetic foot infection)  Relevant Medical History: T2DM, PAD  Interdisciplinary Rounds: did not attend  General Information Comments: Unable to see pt x 2 attempts. Noted pt eating 100% of meals at this time. No wt loss per chart review. Unable to fully assess for malnutrition at this time. Will follow-up.  Nutrition Discharge Planning: adequate po intake    Nutrition Risk Screen    Nutrition Risk Screen: large or nonhealing wound, burn or pressure injury    Nutrition/Diet History    Spiritual, Cultural Beliefs, Buddhist Practices, Values that Affect Care: no  Factors Affecting Nutritional Intake: None identified at this time    Anthropometrics    Temp: 96.9 °F (36.1 °C)  Height Method: Stated  Height: 5' 6" (167.6 cm)  Height (inches): 66 in  Weight Method: Bed Scale  Weight: 79.4 kg (175 lb)  Weight (lb): 175 lb  Ideal Body Weight (IBW), Female: 130 lb  % Ideal Body Weight, Female (lb): 134.62 %  BMI (Calculated): 28.3  BMI Grade: 25 - 29.9 - overweight     Lab/Procedures/Meds    Pertinent Labs Reviewed: reviewed  Pertinent Labs Comments: glucose 117  Pertinent Medications Reviewed: reviewed  Pertinent Medications Comments: lisinopril, docusate    Estimated/Assessed Needs    Weight Used For Calorie Calculations: 79.4 kg (175 lb 0.7 oz)  Energy Calorie Requirements (kcal): 1662 kcal/day  Energy Need Method: [place]-St Jeor(x 1.25)  Protein Requirements:  gm/day(1.0-1.3 gm/kg)  Weight Used For Protein Calculations: 79.4 kg (175 lb 0.7 oz)  Fluid Requirements (mL): 1 mL/kcal or " per MD     RDA Method (mL): 1662     Nutrition Prescription Ordered    Current Diet Order: Diabetic    Evaluation of Received Nutrient/Fluid Intake    I/O: +5.329L since admit  Comments: LBM 2/19  Tolerance: tolerating  % Intake of Estimated Energy Needs: 75 - 100 %  % Meal Intake: 75 - 100 %    Nutrition Risk    Level of Risk/Frequency of Follow-up: high(f/u 2 x wk)     Assessment and Plan    Nutrition Problem  Increased Nutrient Needs: Protein     Related to (etiology):   Wound healing demands    Signs and Symptoms (as evidenced by):   Diabetic foot ulcer      Interventions (treatment strategy):  Collaboration of nutrition care with other providers    Nutrition Diagnosis Status:   New     Monitor and Evaluation    Food and Nutrient Intake: energy intake, food and beverage intake  Food and Nutrient Adminstration: diet order  Physical Activity and Function: nutrition-related ADLs and IADLs  Anthropometric Measurements: weight, weight change, body mass index  Biochemical Data, Medical Tests and Procedures: electrolyte and renal panel, gastrointestinal profile, glucose/endocrine profile, inflammatory profile, lipid profile  Nutrition-Focused Physical Findings: overall appearance     Nutrition Follow-Up    RD Follow-up?: Yes

## 2020-02-21 NOTE — PLAN OF CARE
02/21/20 1249   Post-Acute Status   Post-Acute Authorization Placement   Post-Acute Placement Status Pending Payor Review   PASCALE outreached [facility] to follow up on patient referral. Provider reports patient accepted and is submitting for auth at this time. SW sent cxr, ppd and 142 to provider via [facility]. Provider states will follow up with SW with updates on patient auth status.   Keysha Garcia LMSW [facility]     (3:30PM) PASCALE reached out to [facility] to follow up on patient auth status. Auth still pending at this time. Provider states auth probably wont be received until Monday and to plan for discharge to SNF on Monday. PASCALE will inform MD and tx team.   Keysha Garcia PASCALE  [facility]

## 2020-02-21 NOTE — PLAN OF CARE
Pt is AAOX4,VSS. Pt is progressing towards plan of care goals. Pain management has been assessed. Ibuprofen given prn pain.SCDs in place with frequent checks for skin breakdown. PICC in place, flushed. LLE wrapped and boot on, cooperated with PT. Fall precautions in place, no report of falls. Safety measures are in place bed in lowest position, wheels locked, call light within reach. Warm compresses for left forearm provided and encouraged to elevate.

## 2020-02-21 NOTE — PLAN OF CARE
02/21/20 0904   Post-Acute Status   Post-Acute Authorization Placement   Post-Acute Placement Status Pending Post-Acute Clinical Review   SW outreached [facility] to follow up on patient SNF referral. Transferred to admissions. No answer. Left voicemail requesting return call. SW will follow up with provider on patient referral status.  AT this time, patient referral is being reviewed by provider. Pending post acute clinical review.     UPDATE: patient denied O SNF on 2/19 due to insurance out of network with provider. As of 2/20/2020, patient denied by [facility] due to inability to meet patient needs.   Keysha Garcia LMSW [facility]      (9:12AM) SW sent referral to [facility] via [facility]. This is last provider in network with patient payor. SW will continue to follow patient for post acute care needs.   Keysha Garcia LMSW [facility]     (10:26AM) 142 received and uploaded to [employer] [facility].  Keysha Garcia LMSW [facility]     (11:30AM) SW outreached [facility] to follow up on patient referral. Spoke with  who is determining if patient is in network with payor at this time. Provide reports will call SW back with an answer.  Keysha Garcia LMSW [facility]     (12:10PM) SW received call from [facility] stating patient referral denied due to inability to meet patient needs.  Keysha Garcia LMSW [facility]     (12:24PM) SW outreached [facility] to follow up on patient referral. Transferred to admissions. No answer. Left voicemail requesting return call.  Keysha Garcia LMSW [facility]

## 2020-02-21 NOTE — PLAN OF CARE
Recommendations    1. Continue diabetic diet as tolerated.    Pt with good po intake.     2. Recommend adding Jakub for wound healing.    3. RD following.     Goals: continue to consume >75% of all meals by RD follow-up  Nutrition Goal Status: new

## 2020-02-22 LAB
ALBUMIN SERPL BCP-MCNC: 3.2 G/DL (ref 3.5–5.2)
ANION GAP SERPL CALC-SCNC: 6 MMOL/L (ref 8–16)
BASOPHILS # BLD AUTO: 0.04 K/UL (ref 0–0.2)
BASOPHILS NFR BLD: 0.6 % (ref 0–1.9)
BUN SERPL-MCNC: 11 MG/DL (ref 8–23)
CALCIUM SERPL-MCNC: 9.7 MG/DL (ref 8.7–10.5)
CHLORIDE SERPL-SCNC: 107 MMOL/L (ref 95–110)
CO2 SERPL-SCNC: 29 MMOL/L (ref 23–29)
CREAT SERPL-MCNC: 0.8 MG/DL (ref 0.5–1.4)
DIFFERENTIAL METHOD: ABNORMAL
EOSINOPHIL # BLD AUTO: 0.3 K/UL (ref 0–0.5)
EOSINOPHIL NFR BLD: 4.6 % (ref 0–8)
ERYTHROCYTE [DISTWIDTH] IN BLOOD BY AUTOMATED COUNT: 13.9 % (ref 11.5–14.5)
EST. GFR  (AFRICAN AMERICAN): >60 ML/MIN/1.73 M^2
EST. GFR  (NON AFRICAN AMERICAN): >60 ML/MIN/1.73 M^2
GLUCOSE SERPL-MCNC: 130 MG/DL (ref 70–110)
HCT VFR BLD AUTO: 32 % (ref 37–48.5)
HGB BLD-MCNC: 10.2 G/DL (ref 12–16)
IMM GRANULOCYTES # BLD AUTO: 0.01 K/UL (ref 0–0.04)
IMM GRANULOCYTES NFR BLD AUTO: 0.1 % (ref 0–0.5)
LYMPHOCYTES # BLD AUTO: 3.4 K/UL (ref 1–4.8)
LYMPHOCYTES NFR BLD: 48.8 % (ref 18–48)
MCH RBC QN AUTO: 30.2 PG (ref 27–31)
MCHC RBC AUTO-ENTMCNC: 31.9 G/DL (ref 32–36)
MCV RBC AUTO: 95 FL (ref 82–98)
MONOCYTES # BLD AUTO: 0.5 K/UL (ref 0.3–1)
MONOCYTES NFR BLD: 7.8 % (ref 4–15)
NEUTROPHILS # BLD AUTO: 2.6 K/UL (ref 1.8–7.7)
NEUTROPHILS NFR BLD: 38.1 % (ref 38–73)
NRBC BLD-RTO: 0 /100 WBC
PHOSPHATE SERPL-MCNC: 3.2 MG/DL (ref 2.7–4.5)
PLATELET # BLD AUTO: 293 K/UL (ref 150–350)
PMV BLD AUTO: 9.4 FL (ref 9.2–12.9)
POCT GLUCOSE: 112 MG/DL (ref 70–110)
POCT GLUCOSE: 177 MG/DL (ref 70–110)
POCT GLUCOSE: 190 MG/DL (ref 70–110)
POTASSIUM SERPL-SCNC: 3.9 MMOL/L (ref 3.5–5.1)
RBC # BLD AUTO: 3.38 M/UL (ref 4–5.4)
SODIUM SERPL-SCNC: 142 MMOL/L (ref 136–145)
WBC # BLD AUTO: 6.92 K/UL (ref 3.9–12.7)

## 2020-02-22 PROCEDURE — A4216 STERILE WATER/SALINE, 10 ML: HCPCS | Performed by: HOSPITALIST

## 2020-02-22 PROCEDURE — C9399 UNCLASSIFIED DRUGS OR BIOLOG: HCPCS | Performed by: INTERNAL MEDICINE

## 2020-02-22 PROCEDURE — 99232 PR SUBSEQUENT HOSPITAL CARE,LEVL II: ICD-10-PCS | Mod: ,,, | Performed by: INTERNAL MEDICINE

## 2020-02-22 PROCEDURE — 80069 RENAL FUNCTION PANEL: CPT

## 2020-02-22 PROCEDURE — 63600175 PHARM REV CODE 636 W HCPCS: Performed by: HOSPITALIST

## 2020-02-22 PROCEDURE — 25000003 PHARM REV CODE 250: Performed by: HOSPITALIST

## 2020-02-22 PROCEDURE — 99232 SBSQ HOSP IP/OBS MODERATE 35: CPT | Mod: ,,, | Performed by: INTERNAL MEDICINE

## 2020-02-22 PROCEDURE — 11000001 HC ACUTE MED/SURG PRIVATE ROOM

## 2020-02-22 PROCEDURE — 63600175 PHARM REV CODE 636 W HCPCS: Performed by: NURSE PRACTITIONER

## 2020-02-22 PROCEDURE — 63600175 PHARM REV CODE 636 W HCPCS: Performed by: INTERNAL MEDICINE

## 2020-02-22 PROCEDURE — 85025 COMPLETE CBC W/AUTO DIFF WBC: CPT

## 2020-02-22 RX ORDER — INSULIN ASPART 100 [IU]/ML
3 INJECTION, SOLUTION INTRAVENOUS; SUBCUTANEOUS
Status: DISCONTINUED | OUTPATIENT
Start: 2020-02-22 | End: 2020-02-24 | Stop reason: HOSPADM

## 2020-02-22 RX ADMIN — PIPERACILLIN AND TAZOBACTAM 4.5 G: 4; .5 INJECTION, POWDER, LYOPHILIZED, FOR SOLUTION INTRAVENOUS; PARENTERAL at 02:02

## 2020-02-22 RX ADMIN — INSULIN DETEMIR 10 UNITS: 100 INJECTION, SOLUTION SUBCUTANEOUS at 11:02

## 2020-02-22 RX ADMIN — Medication 10 ML: at 05:02

## 2020-02-22 RX ADMIN — Medication 10 ML: at 12:02

## 2020-02-22 RX ADMIN — DOCUSATE SODIUM - SENNOSIDES 1 TABLET: 50; 8.6 TABLET, FILM COATED ORAL at 08:02

## 2020-02-22 RX ADMIN — Medication 10 ML: at 08:02

## 2020-02-22 RX ADMIN — DOCUSATE SODIUM - SENNOSIDES 1 TABLET: 50; 8.6 TABLET, FILM COATED ORAL at 04:02

## 2020-02-22 RX ADMIN — ENOXAPARIN SODIUM 40 MG: 100 INJECTION SUBCUTANEOUS at 04:02

## 2020-02-22 RX ADMIN — INSULIN ASPART 3 UNITS: 100 INJECTION, SOLUTION INTRAVENOUS; SUBCUTANEOUS at 04:02

## 2020-02-22 RX ADMIN — PIPERACILLIN AND TAZOBACTAM 4.5 G: 4; .5 INJECTION, POWDER, LYOPHILIZED, FOR SOLUTION INTRAVENOUS; PARENTERAL at 10:02

## 2020-02-22 RX ADMIN — PIPERACILLIN AND TAZOBACTAM 4.5 G: 4; .5 INJECTION, POWDER, LYOPHILIZED, FOR SOLUTION INTRAVENOUS; PARENTERAL at 05:02

## 2020-02-22 NOTE — PROGRESS NOTES
"      [facility] Hospital Medicine  Progress Note    Patient Name: Ana Maria Souza  MRN: [id number]  Patient Class: IP- Inpatient   Admission Date: 2/12/2020  Length of Stay: 10 days  Attending Physician: AURE Johnson MD  Primary Care Provider: Primary Doctor [facility] Medicine Team: [facility]  SHIN Johnson MD    Subjective:     Principal Problem:Diabetic foot infection    Interval History: Ms. Souza is complaining of left ankle pain, "the foot isn't bothering me, but that ankle must be broken."  She is having trouble bearing weight on it.  She denies fever or chills.  She is a little anxious about going to the skilled nursing.  Her appetite is good.  She has no SOB or CP.    Review of Systems   Constitutional: Positive for fatigue. Negative for chills and fever.   HENT: Negative for congestion.    Respiratory: Negative for cough and shortness of breath.    Cardiovascular: Negative for chest pain.   Gastrointestinal: Positive for constipation. Negative for abdominal pain, diarrhea, nausea and vomiting.   Musculoskeletal:        +Left ankle pain   Skin: Positive for color change.   Neurological: Negative for dizziness and weakness.   Psychiatric/Behavioral: Positive for dysphoric mood. Negative for confusion. The patient is not nervous/anxious.      Objective:     Vital Signs (Most Recent):  Temp: 98.1 °F (36.7 °C) (02/22/20 0315)  Pulse: 86 (02/22/20 0315)  Resp: 14 (02/22/20 0315)  BP: (!) 169/87 (02/22/20 0315)  SpO2: 98 % (02/22/20 0315) Vital Signs (24h Range):  Temp:  [96.9 °F (36.1 °C)-98.1 °F (36.7 °C)] 98.1 °F (36.7 °C)  Pulse:  [68-95] 86  Resp:  [14-18] 14  SpO2:  [94 %-99 %] 98 %  BP: (152-192)/(67-92) 169/87     Weight: 79.4 kg (175 lb)  Body mass index is 28.25 kg/m².    Intake/Output Summary (Last 24 hours) at 2/22/2020 0833  Last data filed at 2/21/2020 1500  Gross per 24 hour   Intake 780 ml   Output --   Net 780 ml      Physical Exam   Constitutional: She is " oriented to person, place, and time. She appears well-developed and well-nourished.  Non-toxic appearance. She does not have a sickly appearance. She does not appear ill. No distress.   HENT:   Head: Normocephalic and atraumatic.   Mouth/Throat: No oropharyngeal exudate.   Eyes: Pupils are equal, round, and reactive to light. Conjunctivae and EOM are normal. Right eye exhibits no discharge. Left eye exhibits no discharge. No scleral icterus.   Neck: Normal range of motion. Neck supple. No JVD present. No tracheal deviation present. No thyromegaly present.   Cardiovascular: Normal rate, regular rhythm, normal heart sounds and intact distal pulses. Exam reveals no gallop and no friction rub.   No murmur heard.  Pulmonary/Chest: Effort normal and breath sounds normal. No stridor. No tachypnea and no bradypnea. No respiratory distress. She has no wheezes. She has no rhonchi. She has no rales. She exhibits no tenderness.   Abdominal: Soft. Bowel sounds are normal. She exhibits no distension and no mass. There is no tenderness. There is no rebound and no guarding.   Genitourinary:   Genitourinary Comments: No cohoa in place   Musculoskeletal: Normal range of motion. She exhibits no edema or tenderness.   Lymphadenopathy:     She has no cervical adenopathy.   1-2+ edema to left leg   Neurological: She is alert and oriented to person, place, and time. She displays normal reflexes. No cranial nerve deficit. She exhibits normal muscle tone. Coordination normal. GCS eye subscore is 4. GCS verbal subscore is 5. GCS motor subscore is 6.   Skin: Skin is warm and dry. No rash noted. She is not diaphoretic. No erythema. No pallor.   Left foot and ankle with some inflammatory hyperpigmentation   Psychiatric: She has a normal mood and affect. Her speech is normal and behavior is normal. Judgment and thought content normal.   Nursing note and vitals reviewed.     Significant Labs:   Recent Results (from the past 24 hour(s))   POCT  glucose    Collection Time: 02/21/20 12:11 PM   Result Value Ref Range    POCT Glucose 202 (H) 70 - 110 mg/dL   POCT glucose    Collection Time: 02/21/20  5:04 PM   Result Value Ref Range    POCT Glucose 153 (H) 70 - 110 mg/dL   POCT glucose    Collection Time: 02/21/20 10:04 PM   Result Value Ref Range    POCT Glucose 191 (H) 70 - 110 mg/dL   CBC auto differential    Collection Time: 02/22/20  2:18 AM   Result Value Ref Range    WBC 6.92 3.90 - 12.70 K/uL    RBC 3.38 (L) 4.00 - 5.40 M/uL    Hemoglobin 10.2 (L) 12.0 - 16.0 g/dL    Hematocrit 32.0 (L) 37.0 - 48.5 %    Mean Corpuscular Volume 95 82 - 98 fL    Mean Corpuscular Hemoglobin 30.2 27.0 - 31.0 pg    Mean Corpuscular Hemoglobin Conc 31.9 (L) 32.0 - 36.0 g/dL    RDW 13.9 11.5 - 14.5 %    Platelets 293 150 - 350 K/uL    MPV 9.4 9.2 - 12.9 fL    Immature Granulocytes 0.1 0.0 - 0.5 %    Gran # (ANC) 2.6 1.8 - 7.7 K/uL    Immature Grans (Abs) 0.01 0.00 - 0.04 K/uL    Lymph # 3.4 1.0 - 4.8 K/uL    Mono # 0.5 0.3 - 1.0 K/uL    Eos # 0.3 0.0 - 0.5 K/uL    Baso # 0.04 0.00 - 0.20 K/uL    nRBC 0 0 /100 WBC    Gran% 38.1 38.0 - 73.0 %    Lymph% 48.8 (H) 18.0 - 48.0 %    Mono% 7.8 4.0 - 15.0 %    Eosinophil% 4.6 0.0 - 8.0 %    Basophil% 0.6 0.0 - 1.9 %    Differential Method Automated    Renal function panel    Collection Time: 02/22/20  2:18 AM   Result Value Ref Range    Glucose 130 (H) 70 - 110 mg/dL    Sodium 142 136 - 145 mmol/L    Potassium 3.9 3.5 - 5.1 mmol/L    Chloride 107 95 - 110 mmol/L    CO2 29 23 - 29 mmol/L    BUN, Bld 11 8 - 23 mg/dL    Calcium 9.7 8.7 - 10.5 mg/dL    Creatinine 0.8 0.5 - 1.4 mg/dL    Albumin 3.2 (L) 3.5 - 5.2 g/dL    Phosphorus 3.2 2.7 - 4.5 mg/dL    eGFR if African American >60.0 >60 mL/min/1.73 m^2    eGFR if non African American >60.0 >60 mL/min/1.73 m^2    Anion Gap 6 (L) 8 - 16 mmol/L     Significant Imaging:   None in the last 24 hours    Assessment/Plan:      Diabetic Foot Wound   Left foot abscess  Acute osteomyelitis of PIP and  DIP of 4th and 5th toes  -INCISION AND DRAINAGE, FOOT (Left)     -Growing out ESCHERICHIA COLI and ENTEROCOCCUS FAECALIS  -Afebrile and without leukocytosis.  ESR 88, CRP 7  -MRI with left forefoot:   -Associated heterogeneous region of central non-enhancement    -concerning for abscess or necrotic tissue with dimensions as above.     -There are scattered foci of susceptibility artifact throughout this region concerning for soft tissue gas.    -Abnormal marrow edemaand enhancement involving the 5th metatarsal and phalanges     -Abnormal marrow edemaand enhancement proximal and middle phalanges of the 4th toe    -All concerning for associated osteomyelitis.  -Podiatry consulted, appreciate assistance   -ID consulted, appreciate assistance.    -ID recommended until 3/26 for 6 weeks, has ID follow up on 35, weekly labs faxed to ID clinic.   -piperacillin-tazobactam 4.5 g, Intravenous, Q8H  Wound vac in place, darco boot onm, emphasized heel walking to patient.    -Wound vac orders in place for SNF per podiatry recs.    -Need 10 day follow up with podiatry.  -Walker ordered and in her room    Left foot and ankle pain  -HYDROcodone-acetaminophen 5-325 mg per tablet 1 tablet, 1 tablet, Oral, Q4H PRN   -Ibuprofen tablet 400 mg, 400 mg, Oral, Q6H PRN  -acetaminophen tablet 650 mg, 650 mg, Oral, Q4H PRN    Essential Hypertension  -Chronic issue but not on medication at home  -lisinopril tablet 40 mg, 40 mg, Oral, Daily  -hydrALAZINE tablet 25 mg, 25 mg, Oral, Q8H PRN    Peripheral arterial disease  -Hemodynamically significant stenosis demonstrated in the bilateral anterior tibial arteries  -Needs vascular evaluation but patient currently refuses.     Type 2 DM without Long Term Insulin Use  -HbA1c 7.6  -Not on medication at home  -insulin aspart U-100 pen 0-5 Units, 0-5 Units, Subcutaneous, QID (AC + HS) PRN  -Almost all glucose measurements 150 to 200  -Has new pcp on 3/25 follow up with glucose log to titrate  prn  -Started Levemir 10 Units HS and ASP 3 Units TID with meals (2/22)     Knee Osteoarthritis  -severe on left on xrays, causes walking issues at home  -Follow up with Ortho outpatient     Left antecubital fossa superficial thrombophlebitis  -This was present on exam, likely from infusion or needlestick  -Elevate, warm compresses, will self resolve with supportive care in interim  -U/S confirmation 2/20:   -Superficial thrombophlebitis   -No evidence of a hematoma or other fluid collection    Anemia of chronic disease  -Check B12, folate, Fe studies  -Hg 10.2g      Diet:  diabetic   VTE PPx:  Ambulatory  Goals of Care:  Full      Disposition:  pending snf now   Needs podiatry f/u scheduled.   Ortho outpatient ref placed    VTE Risk Mitigation (From admission, onward)         Ordered     enoxaparin injection 40 mg  Daily      02/15/20 1032     IP VTE HIGH RISK PATIENT  Once      02/12/20 2025               SHIN Johnson MD  Department of Hospital Medicine   [facility]

## 2020-02-23 LAB
ALBUMIN SERPL BCP-MCNC: 3 G/DL (ref 3.5–5.2)
ANION GAP SERPL CALC-SCNC: 7 MMOL/L (ref 8–16)
BASOPHILS # BLD AUTO: 0.05 K/UL (ref 0–0.2)
BASOPHILS NFR BLD: 0.7 % (ref 0–1.9)
BUN SERPL-MCNC: 12 MG/DL (ref 8–23)
CALCIUM SERPL-MCNC: 9.2 MG/DL (ref 8.7–10.5)
CHLORIDE SERPL-SCNC: 108 MMOL/L (ref 95–110)
CO2 SERPL-SCNC: 27 MMOL/L (ref 23–29)
CREAT SERPL-MCNC: 0.7 MG/DL (ref 0.5–1.4)
DIFFERENTIAL METHOD: ABNORMAL
EOSINOPHIL # BLD AUTO: 0.3 K/UL (ref 0–0.5)
EOSINOPHIL NFR BLD: 3.9 % (ref 0–8)
ERYTHROCYTE [DISTWIDTH] IN BLOOD BY AUTOMATED COUNT: 13.9 % (ref 11.5–14.5)
EST. GFR  (AFRICAN AMERICAN): >60 ML/MIN/1.73 M^2
EST. GFR  (NON AFRICAN AMERICAN): >60 ML/MIN/1.73 M^2
GLUCOSE SERPL-MCNC: 111 MG/DL (ref 70–110)
HCT VFR BLD AUTO: 30.2 % (ref 37–48.5)
HGB BLD-MCNC: 9.1 G/DL (ref 12–16)
IMM GRANULOCYTES # BLD AUTO: 0.02 K/UL (ref 0–0.04)
IMM GRANULOCYTES NFR BLD AUTO: 0.3 % (ref 0–0.5)
LYMPHOCYTES # BLD AUTO: 3.3 K/UL (ref 1–4.8)
LYMPHOCYTES NFR BLD: 43.3 % (ref 18–48)
MCH RBC QN AUTO: 29.4 PG (ref 27–31)
MCHC RBC AUTO-ENTMCNC: 30.1 G/DL (ref 32–36)
MCV RBC AUTO: 97 FL (ref 82–98)
MONOCYTES # BLD AUTO: 0.6 K/UL (ref 0.3–1)
MONOCYTES NFR BLD: 8.3 % (ref 4–15)
NEUTROPHILS # BLD AUTO: 3.3 K/UL (ref 1.8–7.7)
NEUTROPHILS NFR BLD: 43.5 % (ref 38–73)
NRBC BLD-RTO: 0 /100 WBC
PHOSPHATE SERPL-MCNC: 3.1 MG/DL (ref 2.7–4.5)
PLATELET # BLD AUTO: 269 K/UL (ref 150–350)
PMV BLD AUTO: 9.2 FL (ref 9.2–12.9)
POCT GLUCOSE: 107 MG/DL (ref 70–110)
POCT GLUCOSE: 139 MG/DL (ref 70–110)
POCT GLUCOSE: 227 MG/DL (ref 70–110)
POCT GLUCOSE: 92 MG/DL (ref 70–110)
POTASSIUM SERPL-SCNC: 3.8 MMOL/L (ref 3.5–5.1)
RBC # BLD AUTO: 3.1 M/UL (ref 4–5.4)
SODIUM SERPL-SCNC: 142 MMOL/L (ref 136–145)
WBC # BLD AUTO: 7.63 K/UL (ref 3.9–12.7)

## 2020-02-23 PROCEDURE — 85025 COMPLETE CBC W/AUTO DIFF WBC: CPT

## 2020-02-23 PROCEDURE — 80069 RENAL FUNCTION PANEL: CPT

## 2020-02-23 PROCEDURE — 63600175 PHARM REV CODE 636 W HCPCS: Performed by: HOSPITALIST

## 2020-02-23 PROCEDURE — 63600175 PHARM REV CODE 636 W HCPCS: Performed by: NURSE PRACTITIONER

## 2020-02-23 PROCEDURE — 99232 SBSQ HOSP IP/OBS MODERATE 35: CPT | Mod: ,,, | Performed by: INTERNAL MEDICINE

## 2020-02-23 PROCEDURE — A4216 STERILE WATER/SALINE, 10 ML: HCPCS | Performed by: HOSPITALIST

## 2020-02-23 PROCEDURE — 25000003 PHARM REV CODE 250: Performed by: HOSPITALIST

## 2020-02-23 PROCEDURE — 99232 PR SUBSEQUENT HOSPITAL CARE,LEVL II: ICD-10-PCS | Mod: ,,, | Performed by: INTERNAL MEDICINE

## 2020-02-23 PROCEDURE — 11000001 HC ACUTE MED/SURG PRIVATE ROOM

## 2020-02-23 RX ADMIN — INSULIN ASPART 3 UNITS: 100 INJECTION, SOLUTION INTRAVENOUS; SUBCUTANEOUS at 05:02

## 2020-02-23 RX ADMIN — Medication 10 ML: at 11:02

## 2020-02-23 RX ADMIN — Medication 10 ML: at 05:02

## 2020-02-23 RX ADMIN — DOCUSATE SODIUM - SENNOSIDES 1 TABLET: 50; 8.6 TABLET, FILM COATED ORAL at 11:02

## 2020-02-23 RX ADMIN — INSULIN ASPART 3 UNITS: 100 INJECTION, SOLUTION INTRAVENOUS; SUBCUTANEOUS at 12:02

## 2020-02-23 RX ADMIN — Medication 10 ML: at 06:02

## 2020-02-23 RX ADMIN — INSULIN ASPART 2 UNITS: 100 INJECTION, SOLUTION INTRAVENOUS; SUBCUTANEOUS at 05:02

## 2020-02-23 RX ADMIN — PIPERACILLIN AND TAZOBACTAM 4.5 G: 4; .5 INJECTION, POWDER, LYOPHILIZED, FOR SOLUTION INTRAVENOUS; PARENTERAL at 05:02

## 2020-02-23 RX ADMIN — PIPERACILLIN AND TAZOBACTAM 4.5 G: 4; .5 INJECTION, POWDER, LYOPHILIZED, FOR SOLUTION INTRAVENOUS; PARENTERAL at 10:02

## 2020-02-23 RX ADMIN — ENOXAPARIN SODIUM 40 MG: 100 INJECTION SUBCUTANEOUS at 05:02

## 2020-02-23 RX ADMIN — INSULIN ASPART 3 UNITS: 100 INJECTION, SOLUTION INTRAVENOUS; SUBCUTANEOUS at 08:02

## 2020-02-23 RX ADMIN — DOCUSATE SODIUM - SENNOSIDES 1 TABLET: 50; 8.6 TABLET, FILM COATED ORAL at 08:02

## 2020-02-23 RX ADMIN — Medication 10 ML: at 12:02

## 2020-02-23 NOTE — PROGRESS NOTES
"      [facility] Hospital Medicine  Progress Note    Patient Name: Ana Maria Souza  MRN: [id number]  Patient Class: IP- Inpatient   Admission Date: 2/12/2020  Length of Stay: 11 days  Attending Physician: AURE Johnson MD  Primary Care Provider: Primary Doctor [facility] Medicine Team: [facility] HOSP MED  SHIN Johnson MD    Subjective:     Principal Problem:Diabetic foot infection    Interval History: Ms. Souza states that her ankle still hurts to the poin that she cannot bear weight.  She denies fever or chills.  She is refusing to take any BP medications, stating, "I don't have pressure.  That's just up because I'm anxious in the hospital.  I'm telling you I ain't taking any pressure pills, so you can get rid of them."    Review of Systems   Constitutional: Positive for fatigue. Negative for chills and fever.   HENT: Negative for congestion.    Respiratory: Negative for cough and shortness of breath.    Cardiovascular: Negative for chest pain.   Gastrointestinal: Positive for constipation. Negative for abdominal pain, diarrhea, nausea and vomiting.   Musculoskeletal:        +Left ankle pain   Skin: Positive for color change.   Neurological: Negative for dizziness and weakness.   Psychiatric/Behavioral: Positive for dysphoric mood. Negative for confusion. The patient is nervous/anxious.      Objective:     Vital Signs (Most Recent):  Temp: 97.1 °F (36.2 °C) (02/23/20 0740)  Pulse: 69 (02/23/20 0740)  Resp: 16 (02/23/20 0740)  BP: (!) 167/76 (02/23/20 0740)  SpO2: 95 % (02/23/20 0740) Vital Signs (24h Range):  Temp:  [97.1 °F (36.2 °C)-99.1 °F (37.3 °C)] 97.1 °F (36.2 °C)  Pulse:  [69-88] 69  Resp:  [14-16] 16  SpO2:  [93 %-98 %] 95 %  BP: (142-187)/() 167/76     Weight: 79.4 kg (175 lb)  Body mass index is 28.25 kg/m².  No intake or output data in the 24 hours ending 02/23/20 0915   Physical Exam   Constitutional: She is oriented to person, place, and time. She appears " well-developed and well-nourished.  Non-toxic appearance. She does not have a sickly appearance. She does not appear ill. No distress.   HENT:   Head: Normocephalic and atraumatic.   Mouth/Throat: No oropharyngeal exudate.   Eyes: Pupils are equal, round, and reactive to light. Conjunctivae and EOM are normal. Right eye exhibits no discharge. Left eye exhibits no discharge. No scleral icterus.   Neck: Normal range of motion. Neck supple. No JVD present. No tracheal deviation present. No thyromegaly present.   Cardiovascular: Normal rate, regular rhythm, normal heart sounds and intact distal pulses. Exam reveals no gallop and no friction rub.   No murmur heard.  Pulmonary/Chest: Effort normal and breath sounds normal. No stridor. No tachypnea and no bradypnea. No respiratory distress. She has no wheezes. She has no rhonchi. She has no rales. She exhibits no tenderness.   Abdominal: Soft. Bowel sounds are normal. She exhibits no distension and no mass. There is no tenderness. There is no rebound and no guarding.   Genitourinary:   Genitourinary Comments: No ochoa in place   Musculoskeletal: Normal range of motion. She exhibits no edema or tenderness.   Lymphadenopathy:     She has no cervical adenopathy.   1-2+ edema to left leg   Neurological: She is alert and oriented to person, place, and time. She displays normal reflexes. No cranial nerve deficit. She exhibits normal muscle tone. Coordination normal. GCS eye subscore is 4. GCS verbal subscore is 5. GCS motor subscore is 6.   Skin: Skin is warm and dry. No rash noted. She is not diaphoretic. No erythema. No pallor.   Left foot and ankle with some inflammatory hyperpigmentation   Psychiatric: She has a normal mood and affect. Her speech is normal and behavior is normal. Judgment and thought content normal.   Nursing note and vitals reviewed.     Significant Labs:   Recent Results (from the past 24 hour(s))   POCT glucose    Collection Time: 02/22/20  4:14 PM    Result Value Ref Range    POCT Glucose 190 (H) 70 - 110 mg/dL   POCT glucose    Collection Time: 02/22/20 10:16 PM   Result Value Ref Range    POCT Glucose 177 (H) 70 - 110 mg/dL   CBC auto differential    Collection Time: 02/23/20  3:54 AM   Result Value Ref Range    WBC 7.63 3.90 - 12.70 K/uL    RBC 3.10 (L) 4.00 - 5.40 M/uL    Hemoglobin 9.1 (L) 12.0 - 16.0 g/dL    Hematocrit 30.2 (L) 37.0 - 48.5 %    Mean Corpuscular Volume 97 82 - 98 fL    Mean Corpuscular Hemoglobin 29.4 27.0 - 31.0 pg    Mean Corpuscular Hemoglobin Conc 30.1 (L) 32.0 - 36.0 g/dL    RDW 13.9 11.5 - 14.5 %    Platelets 269 150 - 350 K/uL    MPV 9.2 9.2 - 12.9 fL    Immature Granulocytes 0.3 0.0 - 0.5 %    Gran # (ANC) 3.3 1.8 - 7.7 K/uL    Immature Grans (Abs) 0.02 0.00 - 0.04 K/uL    Lymph # 3.3 1.0 - 4.8 K/uL    Mono # 0.6 0.3 - 1.0 K/uL    Eos # 0.3 0.0 - 0.5 K/uL    Baso # 0.05 0.00 - 0.20 K/uL    nRBC 0 0 /100 WBC    Gran% 43.5 38.0 - 73.0 %    Lymph% 43.3 18.0 - 48.0 %    Mono% 8.3 4.0 - 15.0 %    Eosinophil% 3.9 0.0 - 8.0 %    Basophil% 0.7 0.0 - 1.9 %    Differential Method Automated    Renal function panel    Collection Time: 02/23/20  3:54 AM   Result Value Ref Range    Glucose 111 (H) 70 - 110 mg/dL    Sodium 142 136 - 145 mmol/L    Potassium 3.8 3.5 - 5.1 mmol/L    Chloride 108 95 - 110 mmol/L    CO2 27 23 - 29 mmol/L    BUN, Bld 12 8 - 23 mg/dL    Calcium 9.2 8.7 - 10.5 mg/dL    Creatinine 0.7 0.5 - 1.4 mg/dL    Albumin 3.0 (L) 3.5 - 5.2 g/dL    Phosphorus 3.1 2.7 - 4.5 mg/dL    eGFR if African American >60.0 >60 mL/min/1.73 m^2    eGFR if non African American >60.0 >60 mL/min/1.73 m^2    Anion Gap 7 (L) 8 - 16 mmol/L   POCT glucose    Collection Time: 02/23/20  7:45 AM   Result Value Ref Range    POCT Glucose 92 70 - 110 mg/dL     Significant Imaging:   None in the last 24 hours     Assessment/Plan:      Diabetic Foot Wound   Left foot abscess  Acute osteomyelitis of PIP and DIP of 4th and 5th toes  -INCISION AND DRAINAGE, FOOT  "(Left)     -Growing out ESCHERICHIA COLI and ENTEROCOCCUS FAECALIS  -Afebrile and without leukocytosis.  ESR 88, CRP 7  -MRI with left forefoot:   -Associated heterogeneous region of central non-enhancement    -concerning for abscess or necrotic tissue with dimensions as above.     -There are scattered foci of susceptibility artifact throughout this region concerning for soft tissue gas.    -Abnormal marrow edemaand enhancement involving the 5th metatarsal and phalanges     -Abnormal marrow edemaand enhancement proximal and middle phalanges of the 4th toe    -All concerning for associated osteomyelitis.  -Podiatry consulted, appreciate assistance   -ID consulted, appreciate assistance.    -ID recommended until 3/26 for 6 weeks, has ID follow up on 35, weekly labs faxed to ID clinic.   -piperacillin-tazobactam 4.5 g, Intravenous, Q8H  Wound vac in place, darco boot onm, emphasized heel walking to patient.    -Wound vac orders in place for SNF per podiatry recs.    -Need 10 day follow up with podiatry.  -Walker ordered and in her room    Left foot and ankle pain  -HYDROcodone-acetaminophen 5-325 mg per tablet 1 tablet, 1 tablet, Oral, Q4H PRN   -acetaminophen tablet 650 mg, 650 mg, Oral, Q4H PRN    Essential Hypertension  -Chronic issue but not on medication at home  -Discontinued the Lisinopril and PRN hydralazine on 2/23   -Patient adamantly states she will take, "no pressure pills"  -I counseled her on the risks of stroke, CHF, CAD and death due to long term uncontrolled HTN    Peripheral arterial disease  -Hemodynamically significant stenosis demonstrated in the bilateral anterior tibial arteries  -Needs vascular evaluation but patient currently refuses.     Type 2 DM without Long Term Insulin Use  -HbA1c 7.6  -Not on medication at home  -insulin aspart U-100 pen 0-5 Units, 0-5 Units, Subcutaneous, QID (AC + HS) PRN  -Almost all glucose measurements 150 to 200  -Has new pcp on 3/25 follow up with glucose log to " titrate prn  -insulin aspart U-100 pen 3 Units, 3 Units, Subcutaneous, TIDWM  -Levemir 5 units QHS started 2/23     Knee Osteoarthritis  -severe on left on xrays, causes walking issues at home  -Follow up with Ortho outpatient     Left antecubital fossa superficial thrombophlebitis  -This was present on exam, likely from infusion or needlestick  -Elevate, warm compresses, will self resolve with supportive care in interim  -U/S confirmation 2/20:   -Superficial thrombophlebitis   -No evidence of a hematoma or other fluid collection    Anemia of chronic disease  -Check B12, folate, Fe studies 2/24 am  -Hg 10.2g      Diet:  diabetic   VTE PPx:  Ambulatory  Goals of Care:  Full      Disposition:  pending snf now   Needs podiatry f/u scheduled.   Ortho outpatient ref placed    VTE Risk Mitigation (From admission, onward)         Ordered     enoxaparin injection 40 mg  Daily      02/15/20 1032     IP VTE HIGH RISK PATIENT  Once      02/12/20 2025               SHIN Johnson MD  Department of Hospital Medicine   [facility]

## 2020-02-23 NOTE — PLAN OF CARE
Pt is AAOx4. VSS with the exception of HTN but pt refuses medications- MD aware. No falls/injury this shift. Reinforced with pt to call when getting OOB. Voids per bedside commode. Pt reports having a bowel movement this shift. No s/s of skin breakdown as pt is independent with re-positioning self. Dressing to left foot is clean, dry and intact. No c/o pain this shift. Antibiotic given as ordered. Accu checks ACHS and insulin given as ordered. Pt uses darco boot when OOB. . Bed in lowest position. Call light within reach. Will continue to monitor.

## 2020-02-23 NOTE — NURSING
Pt refused PRN blood pressure medications. Informed pt of importance of taking medication. Pt states never had blood pressure problems; states pressure will go down at bedtime.

## 2020-02-23 NOTE — NURSING
Plan of care reviewed with pt. Pt aox4, VS as charted. Purposeful rounding for pt care and safety. No complaints of pain. MD Onofre on call notified of pt's refusal of bp medications last few days, informed to tell day team. R PICC dressing change completed. Concern of PICC potential out of place, MD Onofre notified. X-ray done. Given OK to use. No reports of NV. No falls/injury reported this shift. Skin integrity intact. SCD in place. Safety precautions maintained - bed in low position, call light in reach, side rails up x2.

## 2020-02-24 ENCOUNTER — TELEPHONE (OUTPATIENT)
Dept: PODIATRY | Facility: CLINIC | Age: 70
End: 2020-02-24

## 2020-02-24 VITALS
TEMPERATURE: 97 F | RESPIRATION RATE: 14 BRPM | SYSTOLIC BLOOD PRESSURE: 168 MMHG | DIASTOLIC BLOOD PRESSURE: 84 MMHG | BODY MASS INDEX: 28.12 KG/M2 | WEIGHT: 175 LBS | HEART RATE: 78 BPM | OXYGEN SATURATION: 81 % | HEIGHT: 66 IN

## 2020-02-24 LAB
ALBUMIN SERPL BCP-MCNC: 2.7 G/DL (ref 3.5–5.2)
ANION GAP SERPL CALC-SCNC: 7 MMOL/L (ref 8–16)
BASOPHILS # BLD AUTO: 0.03 K/UL (ref 0–0.2)
BASOPHILS NFR BLD: 0.5 % (ref 0–1.9)
BUN SERPL-MCNC: 12 MG/DL (ref 8–23)
CALCIUM SERPL-MCNC: 9.1 MG/DL (ref 8.7–10.5)
CHLORIDE SERPL-SCNC: 107 MMOL/L (ref 95–110)
CO2 SERPL-SCNC: 28 MMOL/L (ref 23–29)
CREAT SERPL-MCNC: 0.8 MG/DL (ref 0.5–1.4)
DIFFERENTIAL METHOD: ABNORMAL
EOSINOPHIL # BLD AUTO: 0.3 K/UL (ref 0–0.5)
EOSINOPHIL NFR BLD: 4.3 % (ref 0–8)
ERYTHROCYTE [DISTWIDTH] IN BLOOD BY AUTOMATED COUNT: 14.1 % (ref 11.5–14.5)
EST. GFR  (AFRICAN AMERICAN): >60 ML/MIN/1.73 M^2
EST. GFR  (NON AFRICAN AMERICAN): >60 ML/MIN/1.73 M^2
FOLATE SERPL-MCNC: 6 NG/ML (ref 4–24)
GLUCOSE SERPL-MCNC: 120 MG/DL (ref 70–110)
HCT VFR BLD AUTO: 29.3 % (ref 37–48.5)
HGB BLD-MCNC: 9 G/DL (ref 12–16)
IMM GRANULOCYTES # BLD AUTO: 0.03 K/UL (ref 0–0.04)
IMM GRANULOCYTES NFR BLD AUTO: 0.5 % (ref 0–0.5)
IRON SERPL-MCNC: 71 UG/DL (ref 30–160)
LYMPHOCYTES # BLD AUTO: 2.9 K/UL (ref 1–4.8)
LYMPHOCYTES NFR BLD: 46.5 % (ref 18–48)
MCH RBC QN AUTO: 30.3 PG (ref 27–31)
MCHC RBC AUTO-ENTMCNC: 30.7 G/DL (ref 32–36)
MCV RBC AUTO: 99 FL (ref 82–98)
MONOCYTES # BLD AUTO: 0.5 K/UL (ref 0.3–1)
MONOCYTES NFR BLD: 8 % (ref 4–15)
NEUTROPHILS # BLD AUTO: 2.5 K/UL (ref 1.8–7.7)
NEUTROPHILS NFR BLD: 40.2 % (ref 38–73)
NRBC BLD-RTO: 0 /100 WBC
PHOSPHATE SERPL-MCNC: 3.5 MG/DL (ref 2.7–4.5)
PLATELET # BLD AUTO: 262 K/UL (ref 150–350)
PMV BLD AUTO: 9.9 FL (ref 9.2–12.9)
POCT GLUCOSE: 167 MG/DL (ref 70–110)
POCT GLUCOSE: 97 MG/DL (ref 70–110)
POTASSIUM SERPL-SCNC: 3.8 MMOL/L (ref 3.5–5.1)
RBC # BLD AUTO: 2.97 M/UL (ref 4–5.4)
SATURATED IRON: 27 % (ref 20–50)
SODIUM SERPL-SCNC: 142 MMOL/L (ref 136–145)
TOTAL IRON BINDING CAPACITY: 262 UG/DL (ref 250–450)
TRANSFERRIN SERPL-MCNC: 177 MG/DL (ref 200–375)
VIT B12 SERPL-MCNC: 420 PG/ML (ref 210–950)
WBC # BLD AUTO: 6.22 K/UL (ref 3.9–12.7)

## 2020-02-24 PROCEDURE — 25000003 PHARM REV CODE 250: Performed by: HOSPITALIST

## 2020-02-24 PROCEDURE — 63600175 PHARM REV CODE 636 W HCPCS: Performed by: INTERNAL MEDICINE

## 2020-02-24 PROCEDURE — 85025 COMPLETE CBC W/AUTO DIFF WBC: CPT

## 2020-02-24 PROCEDURE — 99239 PR HOSPITAL DISCHARGE DAY,>30 MIN: ICD-10-PCS | Mod: ,,, | Performed by: INTERNAL MEDICINE

## 2020-02-24 PROCEDURE — 25000003 PHARM REV CODE 250: Performed by: INTERNAL MEDICINE

## 2020-02-24 PROCEDURE — 82607 VITAMIN B-12: CPT

## 2020-02-24 PROCEDURE — 80069 RENAL FUNCTION PANEL: CPT

## 2020-02-24 PROCEDURE — 63600175 PHARM REV CODE 636 W HCPCS: Performed by: NURSE PRACTITIONER

## 2020-02-24 PROCEDURE — 36415 COLL VENOUS BLD VENIPUNCTURE: CPT

## 2020-02-24 PROCEDURE — 99239 HOSP IP/OBS DSCHRG MGMT >30: CPT | Mod: ,,, | Performed by: INTERNAL MEDICINE

## 2020-02-24 PROCEDURE — 83540 ASSAY OF IRON: CPT

## 2020-02-24 PROCEDURE — 82746 ASSAY OF FOLIC ACID SERUM: CPT

## 2020-02-24 PROCEDURE — A4216 STERILE WATER/SALINE, 10 ML: HCPCS | Performed by: HOSPITALIST

## 2020-02-24 RX ORDER — FOLIC ACID 1 MG/1
1 TABLET ORAL DAILY
Refills: 0
Start: 2020-02-25 | End: 2021-02-24

## 2020-02-24 RX ORDER — INSULIN ASPART 100 [IU]/ML
3 INJECTION, SOLUTION INTRAVENOUS; SUBCUTANEOUS 3 TIMES DAILY
Refills: 0
Start: 2020-02-24 | End: 2020-03-05 | Stop reason: SDUPTHER

## 2020-02-24 RX ORDER — CYANOCOBALAMIN (VITAMIN B-12) 250 MCG
1000 TABLET ORAL DAILY
Status: DISCONTINUED | OUTPATIENT
Start: 2020-02-25 | End: 2020-02-24 | Stop reason: HOSPADM

## 2020-02-24 RX ORDER — CYANOCOBALAMIN 1000 UG/ML
1000 INJECTION, SOLUTION INTRAMUSCULAR; SUBCUTANEOUS ONCE
Status: COMPLETED | OUTPATIENT
Start: 2020-02-24 | End: 2020-02-24

## 2020-02-24 RX ORDER — FOLIC ACID 1 MG/1
1 TABLET ORAL DAILY
Status: DISCONTINUED | OUTPATIENT
Start: 2020-02-24 | End: 2020-02-24 | Stop reason: HOSPADM

## 2020-02-24 RX ORDER — LANOLIN ALCOHOL/MO/W.PET/CERES
1000 CREAM (GRAM) TOPICAL DAILY
Start: 2020-02-25

## 2020-02-24 RX ADMIN — Medication 10 ML: at 06:02

## 2020-02-24 RX ADMIN — DOCUSATE SODIUM - SENNOSIDES 1 TABLET: 50; 8.6 TABLET, FILM COATED ORAL at 08:02

## 2020-02-24 RX ADMIN — HYDROCODONE BITARTRATE AND ACETAMINOPHEN 1 TABLET: 5; 325 TABLET ORAL at 07:02

## 2020-02-24 RX ADMIN — PIPERACILLIN AND TAZOBACTAM 4.5 G: 4; .5 INJECTION, POWDER, LYOPHILIZED, FOR SOLUTION INTRAVENOUS; PARENTERAL at 10:02

## 2020-02-24 RX ADMIN — PIPERACILLIN AND TAZOBACTAM 4.5 G: 4; .5 INJECTION, POWDER, LYOPHILIZED, FOR SOLUTION INTRAVENOUS; PARENTERAL at 01:02

## 2020-02-24 RX ADMIN — Medication 10 ML: at 12:02

## 2020-02-24 RX ADMIN — FOLIC ACID 1 MG: 1 TABLET ORAL at 10:02

## 2020-02-24 RX ADMIN — INSULIN ASPART 3 UNITS: 100 INJECTION, SOLUTION INTRAVENOUS; SUBCUTANEOUS at 07:02

## 2020-02-24 RX ADMIN — INSULIN ASPART 3 UNITS: 100 INJECTION, SOLUTION INTRAVENOUS; SUBCUTANEOUS at 11:02

## 2020-02-24 RX ADMIN — HYDROCODONE BITARTRATE AND ACETAMINOPHEN 1 TABLET: 5; 325 TABLET ORAL at 04:02

## 2020-02-24 RX ADMIN — Medication 10 ML: at 11:02

## 2020-02-24 RX ADMIN — CYANOCOBALAMIN 1000 MCG: 1000 INJECTION, SOLUTION INTRAMUSCULAR; SUBCUTANEOUS at 01:02

## 2020-02-24 NOTE — PLAN OF CARE
02/24/20 1717   Final Note   Assessment Type Final Discharge Note   Anticipated Discharge Disposition SNF  ([facility])   What phone number can be called within the next 1-3 days to see how you are doing after discharge? [phone]   Hospital Follow Up  Appt(s) scheduled? Yes   Discharge plans and expectations educations in teach back method with documentation complete? Yes     Future Appointments   Date Time Provider Department Center   3/2/2020  1:45 PM Mau Peterson DPM [facility] POD [facility]   3/5/2020  9:00 AM Omar Anders MD [facility] ID [facility]   3/25/2020  2:00 PM Ramos Ha,  [facility] IM [facility]

## 2020-02-24 NOTE — DISCHARGE SUMMARY
[facility] Hospital Medicine  Discharge Summary      Patient Name: Ana Maria Souza  MRN: [id number]  Admission Date: 2/12/2020  Hospital Length of Stay: 12 days  Discharge Date and Time:  02/24/2020 12:50 PM  Attending Physician: AURE Johnson MD   Discharging Provider: SHIN Johnson MD  Primary Care Provider: Primary Doctor [facility] Medicine Team: [facility] MED  SHIN Johnson MD    History of Present Illness:     Ms. Ana Maria Souza is a 69 y.o. female with T2DM not on medication, who presents to the ED for evaluation of a foul smelling left foot wound.  She mentons that about 3 months prior to admission, she stepped on a nail.  She didn't seek medical care at that time, but she developed a small found on the dorsum of her left foot.  She started to use OTC creams about 2 weeks ago when she saw that the wound was not healing and seemed to be opening more.  She was using Silver Sulfadiazine and Ammonia Acetate creams, and the wound began to harden with a thick eschar over the next few days.  She then pulled off the eschar, and noticed white purulent material was produced along with a foul smelling odor.  Over time, she has noticed some swelling and erythema extending to her left leg.  She decided to wait and go to the ER once her Medicaid was finalized.  She denies any fevers or chills.  She denies any difficulty with ambulation.  She has been taking Tylenol and Tylenol Extra Strength for pain.    Procedure(s) (LRB):  INCISION AND DRAINAGE, FOOT (Left)      Hospital Course:   Diabetic Foot Wound   Left foot abscess  Acute osteomyelitis of PIP and DIP of 4th and 5th toes  -INCISION AND DRAINAGE, FOOT (Left)                -Growing out ESCHERICHIA COLI and ENTEROCOCCUS FAECALIS  -Afebrile and without leukocytosis.  ESR 88, CRP 7  -MRI with left forefoot:              -Associated heterogeneous region of central non-enhancement               -concerning for abscess or necrotic  "tissue with dimensions as above.                -There are scattered foci of susceptibility artifact throughout this region concerning for soft tissue gas.               -Abnormal marrow edemaand enhancement involving the 5th metatarsal and phalanges                -Abnormal marrow edemaand enhancement proximal and middle phalanges of the 4th toe               -All concerning for associated osteomyelitis.  -Podiatry consulted, appreciate assistance   -ID consulted, appreciate assistance.    -ID recommended until 3/26 for 6 weeks, has ID follow up on 35, weekly labs faxed to ID clinic.              -piperacillin-tazobactam 4.5 g, Intravenous, Q8H  Wound vac in place, darco boot onm, emphasized heel walking to patient.               -Wound vac orders in place for SNF per podiatry recs.               -Need 10 day follow up with podiatry.  -Walker ordered and in her room     Left foot and ankle pain  -HYDROcodone-acetaminophen 5-325 mg per tablet 1 tablet, 1 tablet, Oral, Q4H PRN   -acetaminophen tablet 650 mg, 650 mg, Oral, Q4H PRN     Essential Hypertension  -Chronic issue but not on medication at home  -Discontinued the Lisinopril and PRN hydralazine on 2/23              -Patient adamantly states she will take, "no pressure pills"  -I counseled her on the risks of stroke, CHF, CAD and death due to long term uncontrolled HTN     Peripheral arterial disease  -Hemodynamically significant stenosis demonstrated in the bilateral anterior tibial arteries  -Needs vascular evaluation but patient currently refuses.      Type 2 DM without Long Term Insulin Use  -HbA1c 7.6  -Not on medication at home  -insulin aspart U-100 pen 0-5 Units, 0-5 Units, Subcutaneous, QID (AC + HS) PRN  -Almost all glucose measurements 150 to 200  -Has new pcp on 3/25 follow up with glucose log to titrate prn  -insulin aspart U-100 pen 3 Units, 3 Units, Subcutaneous, TIDWM  -Levemir 5 units QHS started 2/23     Knee Osteoarthritis  -severe on left on " xrays, causes walking issues at home  -Follow up with Ortho outpatient     Left antecubital fossa superficial thrombophlebitis  -This was present on exam, likely from infusion or needlestick  -Elevate, warm compresses, will self resolve with supportive care in interim  -U/S confirmation 2/20:              -Superficial thrombophlebitis              -No evidence of a hematoma or other fluid collection     Anemia of chronic disease  -Checked B12, folate, Fe studies 2/24 am              -Folate 6, borderline low                          -Added folic acid 1 mg PO Daily 2/24              -B12 Borderline low at 420                          -Added B12 1000 mcg PO Daily 2/25                          -B12 1000 mcg SQ x 1 on 2/24  -Hg trend stable: 10.2g --> 9g today      Diet:  diabetic   VTE PPx:  Ambulatory  Goals of Care:  Full  Consults:   Consults (From admission, onward)        Status Ordering Provider     Inpatient consult to Infectious Diseases  Once     Provider:  (Not yet assigned)    Completed MAURY MCDONALD     Inpatient consult to Midline team  Once     Provider:  (Not yet assigned)    Completed GILMER PARISI     Inpatient consult to PICC team (hospitals)  Once     Provider:  (Not yet assigned)    Completed GILMER PARISI     Inpatient consult to Podiatry  Once     Provider:  (Not yet assigned)    Completed SWATHI SOTELO     Inpatient consult to [facility]  Once     Provider:  (Not yet assigned)    Completed GILMER PARISI     Inpatient consult to SNF Nursing Home  Once     Provider:  (Not yet assigned)    Completed GILMER PARISI     Inpatient consult to SNF Nursing Home  Once     Provider:  (Not yet assigned)    Completed GILMER PARISI     Inpatient consult to Social Work  Once     Provider:  (Not yet assigned)    Completed GILMER PARISI     Inpatient consult to Social Work  Once     Provider:  (Not yet assigned)    Completed GILMER PARISI     Inpatient consult to Vascular Surgery  Once    "  Provider:  (Not yet assigned)    Completed DEONDRE GARCIA          Final Active Diagnoses:    Diagnosis Date Noted POA    PRINCIPAL PROBLEM:  Diabetic foot infection [E11.628, L08.9] 02/12/2020 Yes    Swelling of left knee joint [M25.462] 02/20/2020 Yes    Left arm swelling [M79.89] 02/20/2020 No    Degenerative joint disease of knee, left [M17.12] 02/20/2020 Yes    Superficial thrombophlebitis [I80.9] 02/20/2020 No    Osteomyelitis of left foot [M86.9]  Yes    PAD (peripheral artery disease) [I73.9]  Yes    Foot ulcer [L97.509] 02/14/2020 Yes    Cellulitis [L03.90]  Yes    Acute osteomyelitis of toe of left foot [M86.172] 02/13/2020 Yes    Chronic pain of left ankle [M25.572, G89.29] 02/13/2020 Yes    Wound infection [T14.8XXA, L08.9] 02/13/2020 Yes    Type 2 diabetes mellitus, without long-term current use of insulin [E11.9] 02/12/2020 Yes    Essential hypertension [I10] 02/12/2020 Yes      Problems Resolved During this Admission:      Discharged Condition: fair    Disposition: Discharged to Other Facility    Follow Up:  Follow-up Information     [facility] - Infectious Diseases.    Specialty:  Infectious Diseases  Why:  march 5th  Contact information:  [address] Balbuena  [facility] [address] [phone]  Additional information:  1st Floor - [facility]           [facility] - Podiatry In 1 month.    Specialty:  Podiatry  Contact information:  [address] Bal[address]  [facility] [address] [phone]  Additional information:  [facility] - 5th Floor, [address]               Patient Instructions:      WALKER FOR HOME USE     Order Specific Question Answer Comments   Type of Walker: Adult (5'4"-6'6")    With wheels? Yes    Height: 5' 6" (1.676 m)    Weight: 79.4 kg (175 lb)    Length of need (1-99 months): 99    Does patient have medical equipment at home? cane, quad    Does patient have medical equipment at home? shower chair    Please check all that apply: Patient's condition impairs " "ambulation.    Please check all that apply: Patient is unable to safely ambulate without equipment.      WALKER FOR HOME USE     Order Specific Question Answer Comments   Type of Walker: Adult (5'4"-6'6")    With wheels? Yes    Height: 5' 6" (1.676 m)    Weight: 79.4 kg (175 lb)    Length of need (1-99 months): 99    Does patient have medical equipment at home? cane, quad    Does patient have medical equipment at home? bath bench    Does patient have medical equipment at home? bedside commode    Please check all that apply: Patient's condition impairs ambulation.      CANE FOR HOME USE     Order Specific Question Answer Comments   Type of Cane: Narrow Quad    Height: 5' 6" (1.676 m)    Weight: 79.4 kg (175 lb)    Does patient have medical equipment at home? cane, quad    Does patient have medical equipment at home? bath bench    Does patient have medical equipment at home? bedside commode    Length of need (1-99 months): 99    Please check all that apply: Patient's condition impairs ambulation.    Please check all that apply: Patient is unable to safely ambulate without equipment.    Please check all that apply: Cane will be used for gait training.      Ambulatory referral/consult to Orthopedics   Standing Status: Future   Referral Priority: Routine Referral Type: Consultation   Requested Specialty: Orthopedic Surgery   Number of Visits Requested: 1     Medications:  Transfer Medications (for Discharge Readmit only):   Current Facility-Administered Medications   Medication Dose Route Frequency Provider Last Rate Last Dose    acetaminophen tablet 650 mg  650 mg Oral Q4H PRN Jeanne Brandt MD   650 mg at 02/21/20 0904    bisacodyL suppository 10 mg  10 mg Rectal Daily PRN Bee Jasso MD        cyanocobalamin injection 1,000 mcg  1,000 mcg Subcutaneous Once AURE Johnson MD        [START ON 2/25/2020] cyanocobalamin tablet 1,000 mcg  1,000 mcg Oral Daily AURE Johnson MD        dextrose 10% (D10W) " Bolus  12.5 g Intravenous PRN Reyna Tenorio MD        dextrose 10% (D10W) Bolus  25 g Intravenous PRN Reyna Tenorio MD        dextrose 10% (D10W) Bolus  12.5 g Intravenous PRN Reyna Tenorio MD        dextrose 10% (D10W) Bolus  25 g Intravenous PRN Reyna Tenorio MD        enoxaparin injection 40 mg  40 mg Subcutaneous Daily Bee Jasso MD   40 mg at 02/23/20 1726    folic acid tablet 1 mg  1 mg Oral Daily AURE Johnson MD   1 mg at 02/24/20 1005    glucagon (human recombinant) injection 1 mg  1 mg Intramuscular PRN Jeanne Brandt MD        glucose chewable tablet 16 g  16 g Oral PRN Jeanne Brandt MD        glucose chewable tablet 24 g  24 g Oral PRN Jeanne Brandt MD        HYDROcodone-acetaminophen 5-325 mg per tablet 1 tablet  1 tablet Oral Q4H PRN Jeanne Brandt MD   1 tablet at 02/24/20 0724    insulin aspart U-100 pen 0-5 Units  0-5 Units Subcutaneous QID (AC + HS) PRN Everette Abdi PA-C   2 Units at 02/23/20 1728    insulin aspart U-100 pen 3 Units  3 Units Subcutaneous TIDWM AURE Johnson MD   3 Units at 02/24/20 1114    insulin detemir U-100 pen 5 Units  5 Units Subcutaneous QHS AURE Johnson MD        melatonin tablet 6 mg  6 mg Oral Nightly PRN Jeanne Brandt MD        ondansetron injection 8 mg  8 mg Intravenous Q8H PRN Jeanne Brandt MD        piperacillin-tazobactam 4.5 g in sodium chloride 0.9% 100 mL IVPB (ready to mix system)  4.5 g Intravenous Q8H PABLITO Chew, ANP 25 mL/hr at 02/24/20 1005 4.5 g at 02/24/20 1005    promethazine (PHENERGAN) 25 mg in dextrose 5 % 50 mL IVPB  25 mg Intravenous Q6H PRN Jeanne Brandt MD        senna-docusate 8.6-50 mg per tablet 1 tablet  1 tablet Oral BID PRN Jeanne Brandt MD   1 tablet at 02/23/20 1113    senna-docusate 8.6-50 mg per tablet 1 tablet  1 tablet Oral Daily Bee Jasso MD   1 tablet at 02/24/20 0817    sodium chloride 0.9% flush 10 mL  10 mL Intravenous Q6H Bee  HENRI Jasso MD   10 mL at 02/24/20 1111    And    sodium chloride 0.9% flush 10 mL  10 mL Intravenous PRN Bee Jasso MD        sodium chloride 0.9% flush 5 mL  5 mL Intravenous PRN Jeanne Brandt MD        Tdap vaccine injection 0.5 mL  0.5 mL Intramuscular vaccine x 1 dose Jodi Sleem, DO           Significant Diagnostic Studies:   Recent Results (from the past 24 hour(s))   POCT glucose    Collection Time: 02/23/20  5:28 PM   Result Value Ref Range    POCT Glucose 227 (H) 70 - 110 mg/dL   POCT glucose    Collection Time: 02/23/20  8:19 PM   Result Value Ref Range    POCT Glucose 139 (H) 70 - 110 mg/dL   CBC auto differential    Collection Time: 02/24/20  4:40 AM   Result Value Ref Range    WBC 6.22 3.90 - 12.70 K/uL    RBC 2.97 (L) 4.00 - 5.40 M/uL    Hemoglobin 9.0 (L) 12.0 - 16.0 g/dL    Hematocrit 29.3 (L) 37.0 - 48.5 %    Mean Corpuscular Volume 99 (H) 82 - 98 fL    Mean Corpuscular Hemoglobin 30.3 27.0 - 31.0 pg    Mean Corpuscular Hemoglobin Conc 30.7 (L) 32.0 - 36.0 g/dL    RDW 14.1 11.5 - 14.5 %    Platelets 262 150 - 350 K/uL    MPV 9.9 9.2 - 12.9 fL    Immature Granulocytes 0.5 0.0 - 0.5 %    Gran # (ANC) 2.5 1.8 - 7.7 K/uL    Immature Grans (Abs) 0.03 0.00 - 0.04 K/uL    Lymph # 2.9 1.0 - 4.8 K/uL    Mono # 0.5 0.3 - 1.0 K/uL    Eos # 0.3 0.0 - 0.5 K/uL    Baso # 0.03 0.00 - 0.20 K/uL    nRBC 0 0 /100 WBC    Gran% 40.2 38.0 - 73.0 %    Lymph% 46.5 18.0 - 48.0 %    Mono% 8.0 4.0 - 15.0 %    Eosinophil% 4.3 0.0 - 8.0 %    Basophil% 0.5 0.0 - 1.9 %    Differential Method Automated    Renal function panel    Collection Time: 02/24/20  4:40 AM   Result Value Ref Range    Glucose 120 (H) 70 - 110 mg/dL    Sodium 142 136 - 145 mmol/L    Potassium 3.8 3.5 - 5.1 mmol/L    Chloride 107 95 - 110 mmol/L    CO2 28 23 - 29 mmol/L    BUN, Bld 12 8 - 23 mg/dL    Calcium 9.1 8.7 - 10.5 mg/dL    Creatinine 0.8 0.5 - 1.4 mg/dL    Albumin 2.7 (L) 3.5 - 5.2 g/dL    Phosphorus 3.5 2.7 - 4.5 mg/dL    eGFR if  African American >60.0 >60 mL/min/1.73 m^2    eGFR if non African American >60.0 >60 mL/min/1.73 m^2    Anion Gap 7 (L) 8 - 16 mmol/L   Folate    Collection Time: 02/24/20  4:40 AM   Result Value Ref Range    Folate 6.0 4.0 - 24.0 ng/mL   Vitamin B12    Collection Time: 02/24/20  4:40 AM   Result Value Ref Range    Vitamin B-12 420 210 - 950 pg/mL   Iron and TIBC    Collection Time: 02/24/20  4:40 AM   Result Value Ref Range    Iron 71 30 - 160 ug/dL    Transferrin 177 (L) 200 - 375 mg/dL    TIBC 262 250 - 450 ug/dL    Saturated Iron 27 20 - 50 %   POCT glucose    Collection Time: 02/24/20  7:27 AM   Result Value Ref Range    POCT Glucose 97 70 - 110 mg/dL   POCT glucose    Collection Time: 02/24/20 11:15 AM   Result Value Ref Range    POCT Glucose 167 (H) 70 - 110 mg/dL     Imaging Results                    MRI Foot (Forefoot) Left W W/O Contrast (Final result)  Result time 02/13/20 01:26:44                Final result by Mirza Brown MD (02/13/20 01:26:44)                               Impression:        1.  Soft tissue irregularity along the dorsum/lateral aspect of the left forefoot with associated heterogeneous region of central non-enhancement concerning for abscess or necrotic tissue with dimensions as above.  There are scattered foci of susceptibility artifact throughout this region concerning for soft tissue gas.     2.  Abnormal marrow edema and enhancement involving the 5th metatarsal and phalanges as well as the proximal and middle phalanges of the 4th toe concerning for associated osteomyelitis.     This report was flagged in Epic as abnormal.        Electronically signed by:     Mirza Brown MD  Date:                                            02/13/2020  Time:                                            01:26                         Narrative:     EXAMINATION:  MRI FOOT (FOREFOOT) LEFT W W/O CONTRAST     CLINICAL HISTORY:  Osteomyelitis suspected, foot swelling,  diabetic;     TECHNIQUE:  Multiplanar, multisequence imaging of the left forefoot was performed before and after the administration of 9 cc gadolinium based IV contrast.     COMPARISON:  Left foot radiograph series 02/12/2020     FINDINGS:  There is significant soft tissue irregularity/ulceration along the dorsum/lateral aspect of the forefoot.  There is a large region of nonenhancing soft tissue along the lateral aspect of the forefoot measuring approximately 2.7 x 7.6 cm concerning for abscess or necrotic tissue (series 10, image 19).  This demonstrates heterogeneous T2 hyperintensity with scattered foci of susceptibility concerning for soft tissue gas.  There is abnormal marrow edema and enhancement involving the 5th metatarsal as well as the proximal, middle, and distal phalanges concerning for osteomyelitis.  Additional abnormal marrow edema and enhancement is present of the proximal and middle for phalanges of the 4th toe concerning for osteomyelitis.     There is diffuse edema of the plantar foot musculature.  No definite additional discrete collections identified.  There are mild degenerative changes of the midfoot.                                                   X-Ray Foot Complete Left (Final result)  Result time 02/12/20 20:17:22                Final result by Sharad Baker MD (02/12/20 20:17:22)                               Impression:        Osseous erosion involving the distal 5th metatarsal, proximal and middle phalanx of the 5th digit and proximal phalanx of the 4th digit.  There is soft tissue thickening lateral to the 5th metatarsal with probable soft tissue air.  Findings suggest cellulitis with possible abscess and osteomyelitis.  Recommend orthopedic follow-up.     This report was flagged in Epic as abnormal.        Electronically signed by:     Sharad Baker  Date:                                            02/12/2020  Time:                                            20:17                    Narrative:     EXAMINATION:  XR FOOT COMPLETE 3 VIEW LEFT     CLINICAL HISTORY:  .  Unspecified infectious disease     TECHNIQUE:  AP, lateral and oblique views of the left foot were performed.     COMPARISON:  None     FINDINGS:  There is mild erosion noted to the distal 5th metatarsal head and shaft.  Minimal rotation of the adjacent proximal portion of the proximal phalanx of the 5th digit also.  There is soft tissue edema and thickening laterally.  Probable air in the soft tissues laterally also.  Findings suggest cellulitis with possible abscess and osteomyelitis.  Recommend follow-up.     There is erosion of the midportion of the proximal phalanx of the 4th digit also.  Limited visualization.     Mild erosion of the medial margin of the middle phalanx of the 5th digit also.                                                  X-Ray Chest 1 View (Final result)  Result time 02/12/20 18:37:00                Final result by Ramos Rushing MD (02/12/20 18:37:00)                               Impression:        No radiographic acute intrathoracic process seen.  Specifically, no focal consolidation.        Electronically signed by:     Ramos Rushing MD  Date:                                            02/12/2020  Time:                                            18:37                         Narrative:     EXAMINATION:  XR CHEST 1 VIEW     CLINICAL HISTORY:  Sepsis;     TECHNIQUE:  Single frontal view of the chest was performed.     COMPARISON:  None     FINDINGS:  Mild nonspecific elevation of the left hemidiaphragm.The lungs are clear, with normal appearance of pulmonary vasculature and no pleural effusion or pneumothorax.     The cardiac silhouette is normal in size. Suspected mild tortuosity of the thoracic aorta.  Hilar contours are within normal limits.     Bones are intact.  PA and lateral views can be obtained.                      Pending Diagnostic Studies:     Procedure Component Value Units Date/Time     Specimen to Pathology, Surgery Other [[id number]] Collected:  02/13/20 2046    Order Status:  Sent Lab Status:  In process Updated:  02/14/20 0714        Indwelling Lines/Drains at time of discharge:   Lines/Drains/Airways     Peripherally Inserted Central Catheter Line            PICC Double Lumen 02/15/20 1501 right brachial 8 days          Pressure Ulcer                 Negative Pressure Wound Therapy  9 days                Time spent on the discharge of patient: 40 minutes  Patient was seen and examined on the date of discharge and determined to be suitable for discharge.         SHIN Johnson MD  Department of Hospital Medicine  [facility]

## 2020-02-24 NOTE — PROGRESS NOTES
[facility] Hospital Medicine  Progress Note    Patient Name: Ana Maria Souza  MRN: [id number]  Patient Class: IP- Inpatient   Admission Date: 2/12/2020  Length of Stay: 12 days  Attending Physician: AURE Johnson MD  Primary Care Provider: Primary Doctor [facility] Medicine Team: [facility] HOSP MED  SHIN Johnson MD    Subjective:     Principal Problem:Diabetic foot infection    Interval History: Ms. Souza still complains of pain to the right foot.  She is having problems bearing weight due to the pain.  She denies fever or chills.    Review of Systems   Constitutional: Positive for fatigue. Negative for chills and fever.   HENT: Negative for congestion.    Respiratory: Negative for cough and shortness of breath.    Cardiovascular: Negative for chest pain.   Gastrointestinal: Positive for constipation. Negative for abdominal pain, diarrhea, nausea and vomiting.   Musculoskeletal:        +Left ankle pain   Skin: Positive for color change.   Neurological: Negative for dizziness and weakness.   Psychiatric/Behavioral: Positive for dysphoric mood. Negative for confusion. The patient is nervous/anxious.      Objective:     Vital Signs (Most Recent):  Temp: 96.5 °F (35.8 °C) (02/24/20 0730)  Pulse: 78 (02/24/20 0730)  Resp: 14 (02/24/20 0730)  BP: (!) 168/84 (02/24/20 0730)  SpO2: (!) 81 % (02/24/20 0730) Vital Signs (24h Range):  Temp:  [96.5 °F (35.8 °C)-97.8 °F (36.6 °C)] 96.5 °F (35.8 °C)  Pulse:  [67-84] 78  Resp:  [14-18] 14  SpO2:  [81 %-100 %] 81 %  BP: (157-184)/(77-87) 168/84     Weight: 79.4 kg (175 lb)  Body mass index is 28.25 kg/m².    Intake/Output Summary (Last 24 hours) at 2/24/2020 0929  Last data filed at 2/23/2020 1800  Gross per 24 hour   Intake 1400 ml   Output --   Net 1400 ml      Physical Exam   Constitutional: She is oriented to person, place, and time. She appears well-developed and well-nourished.  Non-toxic appearance. She does not have a sickly appearance.  She does not appear ill. No distress.   HENT:   Head: Normocephalic and atraumatic.   Mouth/Throat: No oropharyngeal exudate.   Eyes: Pupils are equal, round, and reactive to light. Conjunctivae and EOM are normal. Right eye exhibits no discharge. Left eye exhibits no discharge. No scleral icterus.   Neck: Normal range of motion. Neck supple. No JVD present. No tracheal deviation present. No thyromegaly present.   Cardiovascular: Normal rate, regular rhythm, normal heart sounds and intact distal pulses. Exam reveals no gallop and no friction rub.   No murmur heard.  Pulmonary/Chest: Effort normal and breath sounds normal. No stridor. No tachypnea and no bradypnea. No respiratory distress. She has no wheezes. She has no rhonchi. She has no rales. She exhibits no tenderness.   Abdominal: Soft. Bowel sounds are normal. She exhibits no distension and no mass. There is no tenderness. There is no rebound and no guarding.   Genitourinary:   Genitourinary Comments: No ochoa in place   Musculoskeletal: Normal range of motion. She exhibits no edema or tenderness.   Lymphadenopathy:     She has no cervical adenopathy.   1-2+ edema to left leg   Neurological: She is alert and oriented to person, place, and time. She displays normal reflexes. No cranial nerve deficit. She exhibits normal muscle tone. Coordination normal. GCS eye subscore is 4. GCS verbal subscore is 5. GCS motor subscore is 6.   Skin: Skin is warm and dry. No rash noted. She is not diaphoretic. No erythema. No pallor.   Left foot and ankle with some inflammatory hyperpigmentation   Psychiatric: She has a normal mood and affect. Her speech is normal and behavior is normal. Judgment and thought content normal.   Nursing note and vitals reviewed.     Significant Labs:   Recent Results (from the past 24 hour(s))   POCT glucose    Collection Time: 02/23/20 12:30 PM   Result Value Ref Range    POCT Glucose 107 70 - 110 mg/dL   POCT glucose    Collection Time: 02/23/20   5:28 PM   Result Value Ref Range    POCT Glucose 227 (H) 70 - 110 mg/dL   POCT glucose    Collection Time: 02/23/20  8:19 PM   Result Value Ref Range    POCT Glucose 139 (H) 70 - 110 mg/dL   CBC auto differential    Collection Time: 02/24/20  4:40 AM   Result Value Ref Range    WBC 6.22 3.90 - 12.70 K/uL    RBC 2.97 (L) 4.00 - 5.40 M/uL    Hemoglobin 9.0 (L) 12.0 - 16.0 g/dL    Hematocrit 29.3 (L) 37.0 - 48.5 %    Mean Corpuscular Volume 99 (H) 82 - 98 fL    Mean Corpuscular Hemoglobin 30.3 27.0 - 31.0 pg    Mean Corpuscular Hemoglobin Conc 30.7 (L) 32.0 - 36.0 g/dL    RDW 14.1 11.5 - 14.5 %    Platelets 262 150 - 350 K/uL    MPV 9.9 9.2 - 12.9 fL    Immature Granulocytes 0.5 0.0 - 0.5 %    Gran # (ANC) 2.5 1.8 - 7.7 K/uL    Immature Grans (Abs) 0.03 0.00 - 0.04 K/uL    Lymph # 2.9 1.0 - 4.8 K/uL    Mono # 0.5 0.3 - 1.0 K/uL    Eos # 0.3 0.0 - 0.5 K/uL    Baso # 0.03 0.00 - 0.20 K/uL    nRBC 0 0 /100 WBC    Gran% 40.2 38.0 - 73.0 %    Lymph% 46.5 18.0 - 48.0 %    Mono% 8.0 4.0 - 15.0 %    Eosinophil% 4.3 0.0 - 8.0 %    Basophil% 0.5 0.0 - 1.9 %    Differential Method Automated    Renal function panel    Collection Time: 02/24/20  4:40 AM   Result Value Ref Range    Glucose 120 (H) 70 - 110 mg/dL    Sodium 142 136 - 145 mmol/L    Potassium 3.8 3.5 - 5.1 mmol/L    Chloride 107 95 - 110 mmol/L    CO2 28 23 - 29 mmol/L    BUN, Bld 12 8 - 23 mg/dL    Calcium 9.1 8.7 - 10.5 mg/dL    Creatinine 0.8 0.5 - 1.4 mg/dL    Albumin 2.7 (L) 3.5 - 5.2 g/dL    Phosphorus 3.5 2.7 - 4.5 mg/dL    eGFR if African American >60.0 >60 mL/min/1.73 m^2    eGFR if non African American >60.0 >60 mL/min/1.73 m^2    Anion Gap 7 (L) 8 - 16 mmol/L   Folate    Collection Time: 02/24/20  4:40 AM   Result Value Ref Range    Folate 6.0 4.0 - 24.0 ng/mL   Vitamin B12    Collection Time: 02/24/20  4:40 AM   Result Value Ref Range    Vitamin B-12 420 210 - 950 pg/mL   Iron and TIBC    Collection Time: 02/24/20  4:40 AM   Result Value Ref Range    Iron  "71 30 - 160 ug/dL    Transferrin 177 (L) 200 - 375 mg/dL    TIBC 262 250 - 450 ug/dL    Saturated Iron 27 20 - 50 %   POCT glucose    Collection Time: 02/24/20  7:27 AM   Result Value Ref Range    POCT Glucose 97 70 - 110 mg/dL     Significant Imaging:   None in the last 24 hours     Assessment/Plan:      Diabetic Foot Wound   Left foot abscess  Acute osteomyelitis of PIP and DIP of 4th and 5th toes  -INCISION AND DRAINAGE, FOOT (Left)     -Growing out ESCHERICHIA COLI and ENTEROCOCCUS FAECALIS  -Afebrile and without leukocytosis.  ESR 88, CRP 7  -MRI with left forefoot:   -Associated heterogeneous region of central non-enhancement    -concerning for abscess or necrotic tissue with dimensions as above.     -There are scattered foci of susceptibility artifact throughout this region concerning for soft tissue gas.    -Abnormal marrow edemaand enhancement involving the 5th metatarsal and phalanges     -Abnormal marrow edemaand enhancement proximal and middle phalanges of the 4th toe    -All concerning for associated osteomyelitis.  -Podiatry consulted, appreciate assistance   -ID consulted, appreciate assistance.    -ID recommended until 3/26 for 6 weeks, has ID follow up on 35, weekly labs faxed to ID clinic.   -piperacillin-tazobactam 4.5 g, Intravenous, Q8H  Wound vac in place, darco boot onm, emphasized heel walking to patient.    -Wound vac orders in place for SNF per podiatry recs.    -Need 10 day follow up with podiatry.  -Walker ordered and in her room    Left foot and ankle pain  -HYDROcodone-acetaminophen 5-325 mg per tablet 1 tablet, 1 tablet, Oral, Q4H PRN   -acetaminophen tablet 650 mg, 650 mg, Oral, Q4H PRN    Essential Hypertension  -Chronic issue but not on medication at home  -Discontinued the Lisinopril and PRN hydralazine on 2/23   -Patient adamantly states she will take, "no pressure pills"  -I counseled her on the risks of stroke, CHF, CAD and death due to long term uncontrolled HTN    Peripheral " arterial disease  -Hemodynamically significant stenosis demonstrated in the bilateral anterior tibial arteries  -Needs vascular evaluation but patient currently refuses.     Type 2 DM without Long Term Insulin Use  -HbA1c 7.6  -Not on medication at home  -insulin aspart U-100 pen 0-5 Units, 0-5 Units, Subcutaneous, QID (AC + HS) PRN  -Almost all glucose measurements 150 to 200  -Has new pcp on 3/25 follow up with glucose log to titrate prn  -insulin aspart U-100 pen 3 Units, 3 Units, Subcutaneous, TIDWM  -Levemir 5 units QHS started 2/23     Knee Osteoarthritis  -severe on left on xrays, causes walking issues at home  -Follow up with Ortho outpatient     Left antecubital fossa superficial thrombophlebitis  -This was present on exam, likely from infusion or needlestick  -Elevate, warm compresses, will self resolve with supportive care in interim  -U/S confirmation 2/20:   -Superficial thrombophlebitis   -No evidence of a hematoma or other fluid collection    Anemia of chronic disease  -Checked B12, folate, Fe studies 2/24 am   -Folate 6, borderline low    -Added folic acid 1 mg PO Daily 2/24   -B12 Borderline low at 420    -Added B12 1000 mcg PO Daily 2/25    -B12 1000 mcg SQ x 1 on 2/24  -Hg trend stable: 10.2g --> 9g today      Diet:  diabetic   VTE PPx:  Ambulatory  Goals of Care:  Full      Disposition:  pending snf now   Needs podiatry f/u scheduled.   Ortho outpatient ref placed    VTE Risk Mitigation (From admission, onward)         Ordered     enoxaparin injection 40 mg  Daily      02/15/20 1032     IP VTE HIGH RISK PATIENT  Once      02/12/20 2025               SHIN Johnson MD  Department of Hospital Medicine   [facility]

## 2020-02-24 NOTE — PLAN OF CARE
02/24/20 1317   Post-Acute Status   Post-Acute Authorization Placement   Post-Acute Placement Status Set-up Complete   PASCALE spoke with Alesia who states patient will need hard script for narco. Alesia states to schedule trasnprot for 3:00PM.   Keysha Garcia, LMSW [facility]     (1:20PM) PASCALE scheduled discharge transportation (wheelchair) to [facility] through [facility]. Patient scheduled for pickup at 3:00PM. Pascale called and provided patient nurse with update about transport arrangements. SW informed patient about discharge and transport arrangements. Patient nurse can call report to [phone] and ask for Nurse Zoie. Patient assigned to room 107.  SW in communication with patient CM and patient care team.  Keysha Garcia, LMSW [facility]

## 2020-02-24 NOTE — PLAN OF CARE
02/24/20 1216   Post-Acute Status   Post-Acute Authorization Placement   Post-Acute Placement Status Authorization Obtained   (12:14PM) PASCALE spoke with erika with [facility] who states auth obtained. Waiting to get room number assignment and then will let SW know when to schedule transport. Patient to sign own paperwork once arriving at SNF facility. Pending dc order and transport arrangements at this time. SW will set up once given direction by [facility].   Keysha Garcia LMSW [facility]     (12:16PM) PASCALE met with patient in room to inform of auth being obtained by [facility] and that patient is able to discharge today. Patient states still [facility]. PASCALE informed that patient denied by other providers that are in network. PASCALE reports will follow up with patient with CM after 1:00PM to proceed with dc plan. SW in communication with CM and patient care team.   Keysha Garcia LMSW [facility]     (12:25PM) PASCALE outreached patient daughter, Nadege, via phone to update on patient discharge status. No answer. Left voicemail requesting return call.   Keysha Garcia LMSW [facility]     (12:58PM) PASCALE and CM spoke with patient in room to discuss dc plans. Patient agrees to discharge today to [facility] and understands that she will have to complete paperwork once arriving to facility. PASCALE sent DC ORDER to provider via [employer]. SW awaiting to hear back from Alesia with [facility] about patient assigned room and when to schedule transport.   Keysha Garcia LMSW [facility]

## 2020-02-24 NOTE — NURSING
Pt A&Ox4, cast padding and ace wrap in place on L foot along with podiatric boot, pt ambulatory, and voids per room toilet independently, PICC in place, updated re: [facility] care [facility], report called to nurse Lino at receiving facility who stated that she needed proof of negative e.Coli wound culture prior to transfer. Pt has not been on isolation during this shift. Pt wound care instructions per MD Elizabeth indicate wound vac present, but also to apply wound vac at SNF facility. To be clear, no wound vac is currently in place on the patient and will be applied at the facility. Clarified via a 2nd call to nurse Lino. SW has confirmed that she will be sent with a hard copy of her Norco prescription. Pt resting comfortably. Will continue to monitor until transport arrives.

## 2020-02-24 NOTE — TELEPHONE ENCOUNTER
----- Message from Dayron Joel sent at 2/24/2020  3:39 PM CST -----  Contact: [place]   Please call [place] at [phone]    Patient is requesting a post op appt (no problems)    Thank you

## 2020-02-24 NOTE — PLAN OF CARE
[facility]    FACILITY TRANSFER ORDERS      Patient Name: Ana Maria Souza  YOB: 1950    PCP: Primary Doctor No   PCP Address: None  PCP Phone Number: None  PCP Fax: None    Encounter Date: 02/24/2020    Admit to: Skilled Nursing at [facility]    Vital Signs:  Routine    Diagnoses:   Active Hospital Problems    Diagnosis  POA    *Diabetic foot infection [E11.628, L08.9]  Yes    Swelling of left knee joint [M25.462]  Yes    Left arm swelling [M79.89]  No    Degenerative joint disease of knee, left [M17.12]  Yes    Superficial thrombophlebitis [I80.9]  No    Osteomyelitis of left foot [M86.9]  Yes    PAD (peripheral artery disease) [I73.9]  Yes    Foot ulcer [L97.509]  Yes    Cellulitis [L03.90]  Yes    Acute osteomyelitis of toe of left foot [M86.172]  Yes    Chronic pain of left ankle [M25.572, G89.29]  Yes    Wound infection [T14.8XXA, L08.9]  Yes    Type 2 diabetes mellitus, without long-term current use of insulin [E11.9]  Yes    Essential hypertension [I10]  Yes      Resolved Hospital Problems   No resolved problems to display.       Allergies:Review of patient's allergies indicates:  No Known Allergies    Diet: diabetic diet: 2000 calorie    Activities: Activity as tolerated and Up with assistance    Nursing:    Please elevate left upper extremity and place warm compresses TID for superficial thrombophlebitis in left antecubital fossa    Labs:      Weekly cbc, cmp, crp, esr and fax results to ID at [phone]           CONSULTS:    Physical Therapy to evaluate and treat. , Occupational Therapy to evaluate and treat. and  to evaluate for community resources/long-range planning.    MISCELLANEOUS CARE:  Routine Skin for Bedridden Patients: Apply moisture barrier cream to all skin folds and wet areas in perineal area daily and after baths and all bowel movements. and Diabetes Care:   SN to perform and educate Diabetic management with blood glucose  monitoring:, Fingerstick blood sugar AC and HS and Report CBG < 60 or > 350 to physician.    WOUND CARE ORDERS  Home health/facility to do dressing changes every 2-3 days as follows:  Rinse with saline, pat dry, apply wound vac on 75mmHg slow continuous therapy.  Dress with 2 rolls cast padding and flexinet.     Medications: Review discharge medications with patient and family and provide education.      Current Discharge Medication List      START taking these medications    Details   acetaminophen (TYLENOL) 325 MG tablet Take 2 tablets (650 mg total) by mouth every 4 (four) hours as needed for Pain (pain scale 1-4).  Refills: 0      bisacodyL (DULCOLAX) 10 mg Supp Place 1 suppository (10 mg total) rectally daily as needed (if no BM x 2 days).  Refills: 0      cyanocobalamin (VITAMIN B-12) 1000 MCG tablet Take 1 tablet (1,000 mcg total) by mouth once daily.      folic acid (FOLVITE) 1 MG tablet Take 1 tablet (1 mg total) by mouth once daily.  Refills: 0      HYDROcodone-acetaminophen (NORCO) 5-325 mg per tablet Take 1 tablet by mouth every 4 (four) hours as needed (pain scale 7-10).  Qty: 10 tablet, Refills: 0    Comments: Quantity prescribed more than 7 day supply? No      !! insulin aspart U-100 (NOVOLOG) 100 unit/mL (3 mL) InPn pen Inject 0-5 Units into the skin before meals and at bedtime as needed (Hyperglycemia per sliding scale Nursing home orders).  Refills: 0      !! insulin aspart U-100 (NOVOLOG) 100 unit/mL (3 mL) InPn pen Inject 3 Units into the skin 3 (three) times daily.  Refills: 0      insulin detemir U-100 (LEVEMIR FLEXTOUCH) 100 unit/mL (3 mL) SubQ InPn pen Inject 5 Units into the skin every evening.  Refills: 0      lisinopril (PRINIVIL,ZESTRIL) 40 MG tablet Take 1 tablet (40 mg total) by mouth once daily.  Qty: 90 tablet, Refills: 3      melatonin (MELATIN) 3 mg tablet Take 2 tablets (6 mg total) by mouth nightly as needed for Insomnia.  Refills: 0      piperacillin sodium/tazobactam  (PIPERACILLIN-TAZOBACTAM 4.5G/100ML SODIUM CHLORIDE 0.9%-READY TO MIX) Inject 100 mLs (4.5 g total) into the vein every 8 (eight) hours.  Qty: 9000 mL, Refills: 1      senna-docusate 8.6-50 mg (PERICOLACE) 8.6-50 mg per tablet Take 1 tablet by mouth 2 (two) times daily as needed for Constipation.      !! sodium chloride 0.9% (NORMAL SALINE FLUSH) injection Inject 10 mLs into the vein every 6 (six) hours.      !! sodium chloride 0.9% (NORMAL SALINE FLUSH) injection Inject 10 mLs into the vein as needed.       !! - Potential duplicate medications found. Please discuss with provider.      STOP taking these medications       aspirin 325 MG tablet Comments:   Reason for Stopping:              Podiatry DC Instructions:  Patient is to follow up with podiatry within 10 days of discharge.  Call [phone]  to make an appointment.  Home health/facility to do dressing changes every 2-3 days as follows:  Rinse with saline, pat dry, apply wound vac on 75mmHg slow continuous therapy.  Dress with 2 rolls cast padding and flexinet.     ID discharge instructions:   Plan/recommendations:  1.  Discontinue vancomycin, ceftriaxone and flagyl, and start zosyn 4.5 grams extended infusion IV q 8 hours.    2.  Estimated duration of antibiotics - 6 weeks from date of surgery. Estimated end date: 3/26/20  3.  Weekly cbc, cmp, crp, esr and fax results to ID at [phone]  4.  ID follow up 2 -3 weeks.   ID will make the appointment.  If no appointment scheduled prior to discharge, please send Epic message to ID charge nurse Tammie Rico.   5.  Please give Tdap before discharge.    6.  Wound care per Podiatry.   7.  As noted above, long discussion with patient regarding recommended re-vascularization for wound healing.    8.  Will sign off.  Please call with questions or re-consult as needed.   _________________________________  W Aristides Johnson MD  02/24/2020

## 2020-02-24 NOTE — PLAN OF CARE
02/24/20 0903   Post-Acute Status   Post-Acute Authorization Placement   Post-Acute Placement Status Pending Payor Review   PASCALE spoke with [facility] to follow up on patient auth status. [facility] auth still pending at this time. [facility] will follow up with [employer] about patient pending auth status and update PASCALE afterwards. PASCALE in communication with CM and patient care team.   Keysha Garcia LMSW  [facility]

## 2020-02-26 ENCOUNTER — TELEPHONE (OUTPATIENT)
Dept: PODIATRY | Facility: CLINIC | Age: 70
End: 2020-02-26

## 2020-02-26 NOTE — TELEPHONE ENCOUNTER
Spoke with nurse from [facility].  Patient is tripping on and over vac drape.  Nurse feels it is a safety issue for the patient.  Spoke with DPM and order to d/c vac and begin [employer] packing and football until appt here 3/2 given.

## 2020-02-26 NOTE — TELEPHONE ENCOUNTER
----- Message from Katiana Wild LPN sent at 2/26/2020  2:22 PM CST -----  Contact: [facility]   Please advise  ----- Message -----  From: Dayron Joel  Sent: 2/26/2020   1:56 PM CST  To: [facility] Staff    Please call [place] at [phone] or [phone]    Questions regarding the wound vac usage    Can the vac be discontinued?    Thank you

## 2020-02-28 ENCOUNTER — TELEPHONE (OUTPATIENT)
Dept: INFECTIOUS DISEASES | Facility: CLINIC | Age: 70
End: 2020-02-28

## 2020-02-28 LAB
ALT SERPL-CCNC: 13 U/L (ref 0–40)
BUN BLD-MCNC: 14 MG/DL (ref 4–21)
CREATININE: 0.62 MG/DL (ref 0.5–1.5)
CRP: <5 MG/L (ref 0–5)
GLUCOSE: 99 MG/DL (ref 65–99)
HBA1C MFR BLD: 7.6 % (ref 4.5–5.7)
HEMATOCRIT BLOOD: 29 % (ref 34.4–48)
HGB BLD-MCNC: 9.9 G/DL (ref 12–16)
PLATELET COUNT: 262 K/UL (ref 130–400)
POTASSIUM: 4.2 MMOL/L (ref 3.5–5.5)
SED RATE (WESTERGREN): 53 MM/HR (ref 0–30)
SODIUM: 139 MMOL/L (ref 135–148)
WBC: 8 K/UL (ref 4.5–11)

## 2020-02-28 NOTE — TELEPHONE ENCOUNTER
Labs faxed from [facility] collected on 02/26/20. Manually entered into system and scanned into .

## 2020-02-29 NOTE — TELEPHONE ENCOUNTER
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  Clean margins + for E coli and E. Faecalis  Antibiotics: Zosyn   Estimated End Date:  3/26/20    WBC - 8.0  H/H - 9.9/29  Platelets -  262  Creatinine - .6  ESR - 53  CRP - <5  AST/ALT - ?/13    She has ID follow up on 3/5/20 with Dr. Anders.

## 2020-03-02 ENCOUNTER — OFFICE VISIT (OUTPATIENT)
Dept: PODIATRY | Facility: CLINIC | Age: 70
End: 2020-03-02
Payer: MEDICARE

## 2020-03-02 VITALS
SYSTOLIC BLOOD PRESSURE: 162 MMHG | HEART RATE: 71 BPM | BODY MASS INDEX: 27.47 KG/M2 | WEIGHT: 175 LBS | DIASTOLIC BLOOD PRESSURE: 70 MMHG | TEMPERATURE: 98 F | HEIGHT: 67 IN

## 2020-03-02 DIAGNOSIS — M86.072 ACUTE HEMATOGENOUS OSTEOMYELITIS OF LEFT FOOT: ICD-10-CM

## 2020-03-02 DIAGNOSIS — L97.502 ULCER OF FOOT WITH FAT LAYER EXPOSED, UNSPECIFIED LATERALITY: ICD-10-CM

## 2020-03-02 DIAGNOSIS — E11.628 DIABETIC FOOT INFECTION: ICD-10-CM

## 2020-03-02 DIAGNOSIS — E11.00 TYPE 2 DIABETES MELLITUS WITH HYPEROSMOLARITY WITHOUT COMA, WITHOUT LONG-TERM CURRENT USE OF INSULIN: Primary | ICD-10-CM

## 2020-03-02 DIAGNOSIS — L08.9 DIABETIC FOOT INFECTION: ICD-10-CM

## 2020-03-02 PROCEDURE — 11721 DEBRIDE NAIL 6 OR MORE: CPT | Mod: Q9,58,59,S$GLB | Performed by: PODIATRIST

## 2020-03-02 PROCEDURE — 99213 PR OFFICE/OUTPT VISIT, EST, LEVL III, 20-29 MIN: ICD-10-PCS | Mod: 25,S$GLB,, | Performed by: PODIATRIST

## 2020-03-02 PROCEDURE — 11721 PR DEBRIDEMENT OF NAILS, 6 OR MORE: ICD-10-PCS | Mod: Q9,58,59,S$GLB | Performed by: PODIATRIST

## 2020-03-02 PROCEDURE — 99213 OFFICE O/P EST LOW 20 MIN: CPT | Mod: 25,S$GLB,, | Performed by: PODIATRIST

## 2020-03-02 PROCEDURE — 11042 PR DEBRIDEMENT, SKIN, SUB-Q TISSUE,=<20 SQ CM: ICD-10-PCS | Mod: 58,S$GLB,, | Performed by: PODIATRIST

## 2020-03-02 PROCEDURE — 99999 PR PBB SHADOW E&M-EST. PATIENT-LVL III: ICD-10-PCS | Mod: PBBFAC,,, | Performed by: PODIATRIST

## 2020-03-02 PROCEDURE — 99999 PR PBB SHADOW E&M-EST. PATIENT-LVL III: CPT | Mod: PBBFAC,,, | Performed by: PODIATRIST

## 2020-03-02 PROCEDURE — 11042 DBRDMT SUBQ TIS 1ST 20SQCM/<: CPT | Mod: 58,S$GLB,, | Performed by: PODIATRIST

## 2020-03-03 ENCOUNTER — TELEPHONE (OUTPATIENT)
Dept: PODIATRY | Facility: CLINIC | Age: 70
End: 2020-03-03

## 2020-03-03 NOTE — PROGRESS NOTES
Subjective:      Patient ID: Ana Maria Souza is a 69 y.o. female.    Chief Complaint: Diabetic Foot Exam (dr gray crowley 03/25/2020) and Diabetes Mellitus    Ana Maria is a 69 y.o. female who presents to the clinic for evaluation and treatment of high risk feet. Ana Maria has no past medical history on file. The patient's chief complaint is foot ulcer, left. This patient has documented high risk feet requiring routine maintenance secondary to diabetes mellitis and those secondary complications of diabetes, as mentioned..  Status post ulcer debridement incision and drainage and partial amputation left foot. She is currently at skilled nursing facility and improving.    PCP: Primary Doctor No    Date Last Seen by PCP:   Chief Complaint   Patient presents with    Diabetic Foot Exam     dr gray crowley 03/25/2020    Diabetes Mellitus         Current shoe gear:  Affected Foot: Football and Darco shoe on the affected foot     Unaffected Foot: Slip-on shoes    History of Trauma: negative  Sign of Infection: none    Hemoglobin A1C   Date Value Ref Range Status   02/26/2020 7.6 (A) 4.5 - 5.7 % Final   02/12/2020 7.6 (H) 4.0 - 5.6 % Final     Comment:     ADA Screening Guidelines:  5.7-6.4%  Consistent with prediabetes  >or=6.5%  Consistent with diabetes  High levels of fetal hemoglobin interfere with the HbA1C  assay. Heterozygous hemoglobin variants (HbS, HgC, etc)do  not significantly interfere with this assay.   However, presence of multiple variants may affect accuracy.         Review of Systems   Constitution: Negative for chills, decreased appetite, fever and night sweats.   HENT: Negative for congestion, ear discharge, nosebleeds and tinnitus.    Eyes: Negative for double vision, pain and visual disturbance.   Cardiovascular: Negative for chest pain, claudication, cyanosis and palpitations.   Respiratory: Negative for cough, hemoptysis, shortness of breath and wheezing.    Endocrine: Negative for cold  intolerance and heat intolerance.   Hematologic/Lymphatic: Negative for adenopathy and bleeding problem.   Skin: Positive for color change, dry skin, nail changes, poor wound healing and unusual hair distribution.   Musculoskeletal: Positive for arthritis, joint pain and stiffness. Negative for myalgias.   Gastrointestinal: Negative for abdominal pain, dysphagia, nausea and vomiting.   Genitourinary: Negative for dysuria, flank pain, hematuria and pelvic pain.   Neurological: Positive for disturbances in coordination, numbness, paresthesias and sensory change.   Psychiatric/Behavioral: Negative for altered mental status, hallucinations and suicidal ideas. The patient does not have insomnia.    Allergic/Immunologic: Negative for environmental allergies and persistent infections.           Objective:      Physical Exam   Constitutional: She is oriented to person, place, and time. She appears well-developed and well-nourished.   HENT:   Head: Normocephalic and atraumatic.   Cardiovascular:   Pulses:       Dorsalis pedis pulses are 1+ on the right side, and 1+ on the left side.        Posterior tibial pulses are 1+ on the right side, and 1+ on the left side.   Pulmonary/Chest: Effort normal.   Musculoskeletal: Normal range of motion.   Anterior, lateral, and posterior muscle groups bilateral lower extremities show strength 4 over 5 symmetrically. Inspection and palpation of the joints and bones reveal no crepitus or joint effusion. No tenderness upon palpation. Mild plantar flexor contractures noted to digits 2 through 5 bilaterally.  Angle and base of gait are normal.   Feet:   Right Foot:   Skin Integrity: Positive for callus and dry skin.   Left Foot:   Skin Integrity: Positive for callus and dry skin.   Neurological: She is alert and oriented to person, place, and time. She displays atrophy and abnormal reflex. A sensory deficit is present.   Reflex Scores:       Patellar reflexes are 1+ on the right side and 1+ on  the left side.       Achilles reflexes are 1+ on the right side and 1+ on the left side.  Sharp, dull, light touch, and vibratory sensation are diminished bilaterally. Proprioceptive sensation is intact to both lower extremities. [place] David monofilament exam shows loss of protective sensation to plantar toes 1 through 5 bilaterally. Deep tendon reflexes to the patellar tendons is 1 over 4 bilaterally symmetrical. Deep tendon reflexes to the Achilles tendon is 0 over 4 bilaterally symmetrical. No ankle clonus or Babinski reflex noted bilaterally. Coordination is fair to both lower extremities.  Patient admits to intermittent burning and tingling in the feet.   Skin: Skin is warm and dry. Capillary refill takes 2 to 3 seconds. There is pallor.   Skin bilateral lower extremities noted to be thin, dry, and atrophic.  Toenails thickened, discolored, with subungual fungal debris and tenderness noted.  Hyperkeratotic lesions noted to both feet plantarly with tenderness.    Large wound noted to dorsal lateral aspect of left foot with partial amputation noted.  Granular in nature with no signs of infection noted. Wound VAC has been discontinued.  Measuring 7 cm x 5 cm x 1 cm.   Psychiatric: She has a normal mood and affect. Her behavior is normal.   Vitals reviewed.            Assessment:       Encounter Diagnoses   Name Primary?    Type 2 diabetes mellitus with hyperosmolarity without coma, without long-term current use of insulin Yes    Diabetic foot infection - Left Foot     Acute hematogenous osteomyelitis of left foot     Ulcer of foot with fat layer exposed, unspecified laterality - Left Foot          Plan:       Ana Maria was seen today for diabetic foot exam and diabetes mellitus.    Diagnoses and all orders for this visit:    Type 2 diabetes mellitus with hyperosmolarity without coma, without long-term current use of insulin  -     Skin Substitute Authorization  -     SUBSEQUENT HOME HEALTH ORDERS    Diabetic  "foot infection - Left Foot  -     Skin Substitute Authorization  -     SUBSEQUENT HOME HEALTH ORDERS    Acute hematogenous osteomyelitis of left foot  -     Skin Substitute Authorization  -     SUBSEQUENT HOME HEALTH ORDERS    Ulcer of foot with fat layer exposed, unspecified laterality - Left Foot  -     Skin Substitute Authorization  -     SUBSEQUENT HOME HEALTH ORDERS      I counseled the patient on her conditions, their implications and medical management.      Wound Debridement    Performed by: Mau Peterson DPM   Authorized by: Patient    Time out: Immediately prior to procedure a "time out" was called to verify the correct patient, procedure, equipment, support staff and site/side marked as required.   Consent Done?:  Yes (Verbal)  Local anesthesia used?: No       Wound Details:    Location:   left foot     Type of Debridement:  Excisional       Length (cm):   7       Width (cm):   5       Depth (cm):   1       Percent Debrided (%):  100             Depth of debridement:  Subcutaneous tissue    Tissue debrided:  Dermis, Epidermis and Subcutaneous    Devitalized tissue debrided:  Biofilm, Callus and Necrotic/Eschar    Instruments:  Blade, Curette and Nippers     Bleeding:  Minimal  Hemostasis Achieved: Yes    Method Used:  Pressure  Patient tolerance:  Patient tolerated the procedure well with no immediate complications  .   Routine Foot Care    Performed by:  Mau Zhou DPM  Authorized by:  Patient     Consent Done?:  Yes (Verbal)     Nail Care Type:  Debride  Location(s): All  (Left 1st Toe, Left 3rd Toe, Left 2nd Toe, Left 4th Toe, Left 5th Toe, Right 1st Toe, Right 2nd Toe, Right 3rd Toe, Right 4th Toe and Right 5th Toe)  Patient tolerance:  Patient tolerated the procedure well with no immediate complications     With patient's permission, the toenails mentioned above were aggressively reduced and debrided using a nail nipper, removing all offending nail and debris. The patient will continue to " monitor the areas daily, inspect the feet, wear protective shoe gear when ambulatory, and moisturizer to maintain skin integrity.      Callus Care Type: Debride    With patient's permission, the calluses/hyperkeratotic lesions mentioned above were aggressively reduced and debrided using a number 15 blade. The patient will continue to monitor the areas daily, inspect the feet, wear protective shoe gear when ambulatory, and moisturizer to maintain skin integrity.     Shoe inspection. Diabetic Foot Education. Patient reminded of the importance of good nutrition and blood sugar control to help prevent podiatric complications of diabetes. Patient instructed on proper foot hygeine. We discussed wearing proper shoe gear, daily foot inspections and Diabetic foot education in detail.    Subsequent Home Health Orders Remove foot dressings. Cleanse wound with normal saline or wound . Dry well.   Apply gentian violet to ronny wound maceration if needed. Apply Medihoney to wound(s), dress with foam dressing, Kerlix, Ace bandage or Coban followed by Darco postoperative surgical shoe. Change 3 times per week.       In addition, submitted for Apligraf skin substitute for an office placement if authorized by insurance.

## 2020-03-03 NOTE — TELEPHONE ENCOUNTER
----- Message from Dary Nicole sent at 3/3/2020  3:37 PM CST -----  Contact: Mirella from [facility] Doctor note faxed over from yesterday.      Contact info fax# [phone]

## 2020-03-04 ENCOUNTER — TELEPHONE (OUTPATIENT)
Dept: INFECTIOUS DISEASES | Facility: CLINIC | Age: 70
End: 2020-03-04

## 2020-03-04 NOTE — TELEPHONE ENCOUNTER
----- Message from Laura Ozuna MA sent at 3/3/2020  5:05 PM CST -----  Contact: [facility]      ----- Message -----  From: PABLITO Chew, ANP  Sent: 3/3/2020   4:37 PM CST  To: Mo Bernal MA    Please call them back. Ask them why she is on isolation.   I don't know why she is on isolation.  She wasn't on isolation here, I don't believe.  Let me know.   Thanks.   ----- Message -----  From: Mo Bernal MA  Sent: 3/3/2020   4:12 PM CST  To: PABLITO Chew, JAYCE        ----- Message -----  From: Susie Ayoub MA  Sent: 3/3/2020   3:55 PM CST  To: Samantha Lazaro MA    Gustavo's pt  ----- Message -----  From: Samara Magana  Sent: 3/3/2020   3:05 PM CST  To: [facility] Staff    Is pt  Still  Under Confinement    Hosp Discharge       Pt in skilled unit  Facility doing wound care.     Confined   Can she come off insolationPl ??    Her wound is contained?    Please call Jeimy       [phone]    [facility]      Thanks

## 2020-03-04 NOTE — TELEPHONE ENCOUNTER
Called back Jeimy with [facility] and informed patient should not be on isolation for ecoli infection in wound. She inquired if can send over an order stating that. Informed will write up and send over when provider signs.

## 2020-03-04 NOTE — TELEPHONE ENCOUNTER
Called Jeimy back and she stated patient under confinement based on orders from hospital because of ecoli infection in wound. Does she have to be on isolation for this? Please advise

## 2020-03-05 ENCOUNTER — OFFICE VISIT (OUTPATIENT)
Dept: INFECTIOUS DISEASES | Facility: CLINIC | Age: 70
End: 2020-03-05
Payer: MEDICARE

## 2020-03-05 ENCOUNTER — TELEPHONE (OUTPATIENT)
Dept: INFECTIOUS DISEASES | Facility: CLINIC | Age: 70
End: 2020-03-05

## 2020-03-05 VITALS
HEIGHT: 67 IN | SYSTOLIC BLOOD PRESSURE: 148 MMHG | TEMPERATURE: 99 F | HEART RATE: 75 BPM | DIASTOLIC BLOOD PRESSURE: 65 MMHG | BODY MASS INDEX: 27.25 KG/M2

## 2020-03-05 DIAGNOSIS — M86.072 ACUTE HEMATOGENOUS OSTEOMYELITIS OF LEFT FOOT: ICD-10-CM

## 2020-03-05 DIAGNOSIS — I73.9 PAD (PERIPHERAL ARTERY DISEASE): ICD-10-CM

## 2020-03-05 DIAGNOSIS — L97.502 ULCER OF FOOT WITH FAT LAYER EXPOSED, UNSPECIFIED LATERALITY: Primary | ICD-10-CM

## 2020-03-05 DIAGNOSIS — M86.172 ACUTE OSTEOMYELITIS OF TOE OF LEFT FOOT: ICD-10-CM

## 2020-03-05 PROCEDURE — 99213 OFFICE O/P EST LOW 20 MIN: CPT | Mod: S$GLB,,, | Performed by: INTERNAL MEDICINE

## 2020-03-05 PROCEDURE — 99213 PR OFFICE/OUTPT VISIT, EST, LEVL III, 20-29 MIN: ICD-10-PCS | Mod: S$GLB,,, | Performed by: INTERNAL MEDICINE

## 2020-03-05 PROCEDURE — 99999 PR PBB SHADOW E&M-EST. PATIENT-LVL IV: CPT | Mod: PBBFAC,,, | Performed by: INTERNAL MEDICINE

## 2020-03-05 PROCEDURE — 99999 PR PBB SHADOW E&M-EST. PATIENT-LVL IV: ICD-10-PCS | Mod: PBBFAC,,, | Performed by: INTERNAL MEDICINE

## 2020-03-05 RX ORDER — NAPROXEN SODIUM 220 MG/1
81 TABLET, FILM COATED ORAL
COMMUNITY
Start: 2019-11-19 | End: 2020-11-18

## 2020-03-05 NOTE — TELEPHONE ENCOUNTER
Called Ida with [facility], nurse stated they are not able to take the would care orders that patient brought with her. Patient is currently in a nursing facility and they have there own wound doctor. They are not able to take wound care orders from outside doctors.

## 2020-03-05 NOTE — TELEPHONE ENCOUNTER
"----- Message from Valery Villaseñor sent at 3/5/2020  2:35 PM CST -----  Contact: [facility]  Name of caller: Lisa (RN)   Provider name: Chago ROSE MD   Contact Preference:  [phone]  Is this regarding current patient or new patient?: current   What is the nature of the call?    - pt had a visit today, but failed to advise that she's in a   nursing home. Orders were sent to them, and they're   unable to do anything with them. Please call and speak   with Lisa as soon as possible to advise.     Additional Notes:   "Thank you for all that you do for our patients'"     "

## 2020-03-05 NOTE — PROGRESS NOTES
Subjective:      Patient ID: Ana Maria Souza is a 69 y.o. female.    Chief Complaint:Follow-up      History of Present Illness  Patient presents from skilled nursing facility for follow-up of osteomyelitis of the left 5th metatarsal, after left 5th ray amputation.  She is on Zosyn for Enterococcus, E coli which grew from her amputation cultures.  She states that her dressing on her foot has not been changed.  She also states that the skilled nursing facility is not changing her PICC line dressing.    Of note, she saw Podiatry on 03/03/2020.  Her wound was examined at that time.  She does not recall this.            Review of Systems   Constitution: Negative for chills, decreased appetite, fever, malaise/fatigue, night sweats, weight gain and weight loss.   HENT: Positive for hearing loss. Negative for congestion, ear pain, hoarse voice, sore throat and tinnitus.    Eyes: Negative for blurred vision, redness and visual disturbance.   Cardiovascular: Negative for chest pain, leg swelling and palpitations.   Respiratory: Negative for cough, hemoptysis, shortness of breath and sputum production.    Hematologic/Lymphatic: Negative for adenopathy. Does not bruise/bleed easily.   Skin: Negative for dry skin, itching, rash and suspicious lesions.   Musculoskeletal: Positive for back pain and joint pain. Negative for myalgias and neck pain.   Gastrointestinal: Negative for abdominal pain, constipation, diarrhea, heartburn, nausea and vomiting.   Genitourinary: Negative for dysuria, flank pain, frequency, hematuria, hesitancy and urgency.   Neurological: Negative for dizziness, headaches, numbness, paresthesias and weakness.   Psychiatric/Behavioral: Negative for depression and memory loss. The patient does not have insomnia and is not nervous/anxious.      Objective:   Physical Exam   Constitutional: She is oriented to person, place, and time. She appears well-developed and well-nourished.   Cardiovascular: Normal rate,  regular rhythm and normal heart sounds.   Pulmonary/Chest: Effort normal and breath sounds normal.   Abdominal: Soft. Bowel sounds are normal.   Musculoskeletal: Normal range of motion. She exhibits no edema.   PICC line in right arm.  No erythema, edema, or tenderness.  Dressing is intact.  See picture.    Left hallux with some callus.  She states that this was burned with a heating pad.  See picture.       Neurological: She is alert and oriented to person, place, and time.   Skin: Skin is warm and dry.   Nursing note and vitals reviewed.                    Assessment:       1. Ulcer of foot with fat layer exposed, unspecified laterality    2. PAD (peripheral artery disease)    3. Acute hematogenous osteomyelitis of left foot    4. Acute osteomyelitis of toe of left foot          Plan:       Continue IV antibiotics until March 26.  Continue weekly laboratories.  Continue wound care to left foot with dressing changes daily.  Continue PICC line care.  Follow-up in 3 weeks.

## 2020-03-10 ENCOUNTER — TELEPHONE (OUTPATIENT)
Dept: PODIATRY | Facility: CLINIC | Age: 70
End: 2020-03-10

## 2020-03-10 LAB — FINAL PATHOLOGIC DIAGNOSIS: NORMAL

## 2020-03-10 NOTE — TELEPHONE ENCOUNTER
Spoke with wound care nurse at skilled nursing facility.  Pt is currently in skilled bed in facility and has NP following for wound care.  Currently still has sutures in place from surgical procedure by Dr Peterson.  Will speak with him in am concerning repercussions of skilled bed placement.

## 2020-03-16 ENCOUNTER — TELEPHONE (OUTPATIENT)
Dept: INFECTIOUS DISEASES | Facility: CLINIC | Age: 70
End: 2020-03-16

## 2020-03-16 LAB
FUNGUS SPEC CULT: NORMAL
FUNGUS SPEC CULT: NORMAL

## 2020-03-19 ENCOUNTER — TELEPHONE (OUTPATIENT)
Dept: PODIATRY | Facility: CLINIC | Age: 70
End: 2020-03-19

## 2020-03-19 NOTE — TELEPHONE ENCOUNTER
Nurse spoke to pt regarding today's appt. Pt states she is at [facility] in [place] and she does not believe they are bringing her to this appt. Pt states she would like to be rescheduled but after her discharge from the [facility] on April 2nd. Pt is informed an appt will be made and mailed out. She requests the appt is mailed to her home address which is verified with the nurse.

## 2020-03-24 ENCOUNTER — HOSPITAL ENCOUNTER (EMERGENCY)
Facility: HOSPITAL | Age: 70
Discharge: HOME | End: 2020-03-25
Attending: EMERGENCY MEDICINE
Payer: MEDICARE

## 2020-03-24 DIAGNOSIS — Z95.828 S/P PICC CENTRAL LINE PLACEMENT: Primary | ICD-10-CM

## 2020-03-24 PROCEDURE — 99285 EMERGENCY DEPT VISIT HI MDM: CPT

## 2020-03-24 PROCEDURE — 99284 EMERGENCY DEPT VISIT MOD MDM: CPT | Mod: 25

## 2020-03-25 VITALS
RESPIRATION RATE: 16 BRPM | WEIGHT: 185 LBS | DIASTOLIC BLOOD PRESSURE: 84 MMHG | HEART RATE: 73 BPM | OXYGEN SATURATION: 96 % | SYSTOLIC BLOOD PRESSURE: 185 MMHG | TEMPERATURE: 98 F | BODY MASS INDEX: 28.8 KG/M2

## 2020-03-25 PROCEDURE — 36569 INSJ PICC 5 YR+ W/O IMAGING: CPT

## 2020-03-25 PROCEDURE — C1751 CATH, INF, PER/CENT/MIDLINE: HCPCS

## 2020-03-25 RX ORDER — SODIUM CHLORIDE 0.9 % (FLUSH) 0.9 %
10 SYRINGE (ML) INJECTION EVERY 6 HOURS
Status: DISCONTINUED | OUTPATIENT
Start: 2020-03-25 | End: 2020-03-25 | Stop reason: HOSPADM

## 2020-03-25 RX ORDER — SODIUM CHLORIDE 0.9 % (FLUSH) 0.9 %
10 SYRINGE (ML) INJECTION
Status: DISCONTINUED | OUTPATIENT
Start: 2020-03-25 | End: 2020-03-25 | Stop reason: HOSPADM

## 2020-03-25 NOTE — PROCEDURES
Ana Maria Souza is a 70 y.o. female patient.    Temp: 98.4 °F (36.9 °C) (03/24/20 2321)  Pulse: 78 (03/24/20 2321)  Resp: 20 (03/24/20 2321)  BP: (!) 164/80 (03/24/20 2321)  SpO2: 96 % (03/24/20 2321)  Weight: 83.9 kg (185 lb) (03/24/20 2321)    PICC  Date/Time: 3/25/2020 2:40 AM  Performed by: Tee Munoz, RN  Consent Done: Yes  Time out: Immediately prior to procedure a time out was called to verify the correct patient, procedure, equipment, support staff and site/side marked as required  Indications: med administration  Anesthesia: local infiltration  Local anesthetic: lidocaine 1% without epinephrine  Anesthetic Total (mL): 1  Preparation: skin prepped with ChloraPrep  Skin prep agent dried: skin prep agent completely dried prior to procedure  Sterile barriers: all five maximum sterile barriers used - cap, mask, sterile gown, sterile gloves, and large sterile sheet  Hand hygiene: hand hygiene performed prior to central venous catheter insertion  Location details: right basilic  Catheter type: double lumen  Catheter size: 5 Fr  Catheter Length: 35cm    Ultrasound guidance: yes  Vessel Caliber: medium and large and patent, compressibility normal  Needle advanced into vessel with real time Ultrasound guidance.  Guidewire confirmed in vessel.  Sterile sheath used.  Number of attempts: 1  Post-procedure: blood return through all ports, chlorhexidine patch and sterile dressing applied  Estimated blood loss (mL): 0            Tee Munoz  3/25/2020

## 2020-03-25 NOTE — ED PROVIDER NOTES
Encounter Date: 3/24/2020       History     Chief Complaint   Patient presents with    Vascular Access Problem     Pt PICC line fell out after pt rolled over in bed, [facility]     70-year-old female with PICC line presenting for PICC line problem.  Patient reports she rolled over neck sleep allowed for PICC line.  Denies significant bleeding.  Denies pain, shortness of breath, chest pain or other concerns.        Review of patient's allergies indicates:  No Known Allergies  Past Medical History:   Diagnosis Date    Cellulitis     Diabetes mellitus     Osteomyelitis     PVD (peripheral vascular disease)      Past Surgical History:   Procedure Laterality Date    INCISION AND DRAINAGE FOOT Left 2/13/2020    Procedure: INCISION AND DRAINAGE, FOOT;  Surgeon: Mau Peterson DPM;  Location: [facility] OR [address] [place] [address];  Service: Podiatry;  Laterality: Left;    TYMPANOPLASTY       History reviewed. No pertinent family history.  Social History     Tobacco Use    Smoking status: Never Smoker    Smokeless tobacco: Never Used   Substance Use Topics    Alcohol use: Not Currently     Alcohol/week: 3.0 standard drinks     Types: 3 Glasses of wine per week     Comment: 3 glasses of wine/ week    Drug use: Never     Review of Systems   Constitutional: Negative for fever.   HENT: Negative for sore throat.    Respiratory: Negative for shortness of breath.    Cardiovascular: Negative for chest pain.   Gastrointestinal: Negative for nausea.   Genitourinary: Negative for dysuria.   Musculoskeletal: Negative for back pain.   Skin: Negative for rash.   Neurological: Negative for weakness.   Hematological: Does not bruise/bleed easily.       Physical Exam     Initial Vitals [03/24/20 2321]   BP Pulse Resp Temp SpO2   (!) 164/80 78 20 98.4 °F (36.9 °C) 96 %      MAP       --         Physical Exam    Nursing note and vitals reviewed.  Constitutional: She appears well-developed and well-nourished. She is  not diaphoretic. No distress.   HENT:   Head: Normocephalic and atraumatic.   Right Ear: External ear normal.   Left Ear: External ear normal.   Mouth/Throat: No oropharyngeal exudate.   Eyes: EOM are normal. Pupils are equal, round, and reactive to light. Right eye exhibits no discharge. Left eye exhibits no discharge.   Neck: No JVD present.   Cardiovascular: Normal rate and intact distal pulses.   Pulmonary/Chest: No respiratory distress.   Abdominal: She exhibits no distension. There is no guarding.   Musculoskeletal: She exhibits no edema.   Neurological: She is alert and oriented to person, place, and time. GCS score is 15. GCS eye subscore is 4. GCS verbal subscore is 5. GCS motor subscore is 6.   Skin: Skin is warm and dry.   Prior PICC line site without significant hematoma, tenderness, warmth, redness or pain.  Bleeding controlled at site.   Psychiatric: She has a normal mood and affect. Her behavior is normal.         ED Course   Procedures  Labs Reviewed - No data to display       Imaging Results          X-Ray Chest 1 View for PICC_Central line (Final result)  Result time 03/25/20 03:11:56    Final result by Mirza Brown MD (03/25/20 03:11:56)                 Impression:      As above.      Electronically signed by: Mirza Brown MD  Date:    03/25/2020  Time:    03:11             Narrative:    EXAMINATION:  XR CHEST 1 VIEW    CLINICAL HISTORY:  Evaluate PICC line placement;    TECHNIQUE:  Single frontal view of the chest was performed.    COMPARISON:  02/23/2020    FINDINGS:  There is a right-sided PICC line in place with tip projecting over the SVC.  Cardiomediastinal silhouette appears within normal limits.  There is mild elevation of the left hemidiaphragm.  The lungs demonstrate no confluent airspace consolidation or pleural effusion.  No evidence of pneumothorax.  Osseous structures appear grossly intact.                                 Medical Decision Making:   Initial Assessment:    Patient accidentally pulled out PICC line.  PICC line team called to replace.  Replaced without difficulty.  X-ray confirms good placement without evidence of pneumothorax.  Believe she is safe for discharge home.                                 Clinical Impression:       ICD-10-CM ICD-9-CM   1. S/P PICC central line placement Z95.828 V45.89         Disposition:   Disposition: Discharged  Condition: Stable     ED Disposition Condition    Discharge Stable        ED Prescriptions     None        Follow-up Information     Follow up With Specialties Details Why Contact Info    [facility]  As needed [address] [facility] [id number] [phone]                                     Stevan Tony MD  03/25/20 0534

## 2020-03-25 NOTE — ED NOTES
Report called to [facility].  Pt [facility].

## 2020-03-25 NOTE — ED TRIAGE NOTES
Pt arrived from [facility] via [facility] after pt pulled PICC line out after rolling over in her sleep, VSS, NAD, AAOX4.  Pt sent for PICC line replacement

## 2020-03-27 ENCOUNTER — TELEPHONE (OUTPATIENT)
Dept: PODIATRY | Facility: CLINIC | Age: 70
End: 2020-03-27

## 2020-03-27 NOTE — TELEPHONE ENCOUNTER
"Nurse spoke with Lisa at [facility] who states that pt is being discharged tomorrow but she needs a post op appt with Dr. Peterson because he cancelled her last appt. Lisa is informed that pt cancelled the appt herself and said that she will call back after she is discharged from their facility on April 2. Lisa is informed that I spoke directly to the patient regarding this appointment. Lisa states that pt has been non-compliant with post op care orders. She states that pt is supposed to be non weight bearing but ambulates often on the site. She also states that all but 2 of pt sutures have "come off" due to the ambulating. Pt is scheduled to see Dr. Peterson on April 2nd. She is already scheduled to see IM on that same day. Lisa states that she will include her new appt dates on her discharge paperwork and I mailed out a notice to address on file with her new date.  "

## 2020-04-17 LAB
ACID FAST MOD KINY STN SPEC: NORMAL
ACID FAST MOD KINY STN SPEC: NORMAL
MYCOBACTERIUM SPEC QL CULT: NORMAL
MYCOBACTERIUM SPEC QL CULT: NORMAL

## 2020-11-13 NOTE — PT/OT/SLP PROGRESS
"Physical Therapy Treatment    Patient Name:  Ana Maria Souza   MRN:  [id number]    Recommendations:     Discharge Recommendations:  nursing facility, skilled   Discharge Equipment Recommendations: wheelchair   Barriers to discharge: Decreased caregiver support    Assessment:     Ana Maria Souza is a 69 y.o. female admitted with a medical diagnosis of Diabetic foot infection.  She presents with the following impairments/functional limitations:  weakness, gait instability, impaired joint extensibility, decreased lower extremity function, impaired balance, impaired self care skills, impaired functional mobilty.      Patient demonstrates good motivation and good activity tolerance secondary appropriate pain control and overall good baseline mobility.  Patient willing to participate in skilled PT with good strength but with some impulsivity and decreased safety.  Patient required between SBA and CGA for bed mobility, transfers, wheelchair mobility and ambulation.  Patient was with decreased safety during transfers but only requires between CGA for transfers and ambulation of 10 ft with quad cane.      Rehab Prognosis: Good; patient continues to benefit from acute skilled PT services to address these deficits and reach maximum level of function.  Patient remains most appropriate to discharge to nursing facility, skilled  Recent Surgery: Procedure(s) (LRB):  INCISION AND DRAINAGE, FOOT (Left) 6 Days Post-Op    Plan:     During this hospitalization, patient to be seen 5 x/week to address the identified rehab impairments via gait training, therapeutic activities, therapeutic exercises, wheelchair management/training and progress toward the following goals:    · Plan of Care Expires:  03/14/20    Subjective     Subjective: "This wheelchair is nice.  I enjoy getting around."   Pain/Comfort:  · Pain Rating 1: 0/10  · Location - Side 1: Left  · Location - Orientation 1: distal  · Location 1: foot  · Pain Addressed 1: " Reposition, Cessation of Activity      Objective:     Communicated with RN prior to session.  Patient found supine with wound vac, telemetry, SCD upon PT entry to room.     General Precautions: Standard, fall, diabetic   Orthopedic Precautions:LLE weight bearing as tolerated(short transfers only)   Braces: N/A     Functional Mobility:  · Bed Mobility:     · Supine to Sit: stand by assistance  · Transfers:     · Sit to Stand:  contact guard assistance with quad cane.  Patient requires cues for weight bearing status and brakes management  · Gait: Patient ambulated 10 ft with quad cane and contact guard assistance   · Balance:   · Sitting: GOOD+: Maintains balance through MAXIMAL excursions of active trunk motion  · Standing: FAIR+: Needs CLOSE SUPERVISION during gait and is able to right self with minor LOB  · Wheelchair Propulsion:  Pt propelled Standard wheelchair x 200 feet on Level tile with  Bilateral upper extremity and intermittent use R LE with Contact Guard Assistance. Patient required intermittent rest breaks secondary to fatigue.        AM-PAC 6 CLICK MOBILITY  Turning over in bed (including adjusting bedclothes, sheets and blankets)?: 4  Sitting down on and standing up from a chair with arms (e.g., wheelchair, bedside commode, etc.): 3  Moving from lying on back to sitting on the side of the bed?: 4  Moving to and from a bed to a chair (including a wheelchair)?: 3  Need to walk in hospital room?: 3  Climbing 3-5 steps with a railing?: 1  Basic Mobility Total Score: 18       Therapeutic Activities and Exercises:   Patient was with some decreased safety navigating wheelchair and required some cues for tight space navigation.    Gait training:  Patient required cues for sequencing and weight bearing status to increase independence and safety.  Patient required cues ~ 25% of the time.    Patient Education:    Patient educated on Fall risk, gait training, home safety, Home exercise program and transfer training  by explanation.  Patient was receptive to education and verbalizes understanding.     Patient left up in chair with all lines intact and call button in reach.    GOALS:   Multidisciplinary Problems     Physical Therapy Goals        Problem: Physical Therapy Goal    Goal Priority Disciplines Outcome Goal Variances Interventions   Physical Therapy Goal     PT, PT/OT Ongoing, Progressing     Description:  Goals to be met by: 20     Patient will increase functional independence with mobility by performin. Sit to stand transfer with Modified [place].  2. Bed to chair transfer with Modified [place] using Quad Cane.  3. Gait  x 100 feet with CGA using Quad Cane. - Cancelled  secondary to WB status  4. Ascend/descend 2 stair with bilateral Handrails Contact Guard Assistance using Quad Cane. - Cancelled  secondary to WB status  5. Lower extremity exercise program x 20 reps per handout, with assistance as needed.  6. Patient will ambulated 10 ft with CGA using quad cane and heel only WB.    7. Patient will propel wheelchair 150ft with SBA using Bilateral UE and R LE - updated 20                         Time Tracking:     PT Received On: 20  PT Start Time: 757     PT Stop Time: 824  PT Total Time (min): 27 min     Billable Minutes: Train/Wheelchair Management 27 min    Treatment Type: Treatment  PT/PTA: PT     PTA Visit Number: 0     Chance Santamaria, PT  2020   obese Follows up with cardiology. s/s obese; Pt was recommended to see a nutritionist by PMD. Has not established care Follows up with cardiology. Reported no symptoms Left ankle fx- aircast but no surgical intervention Upper braces Follows up with cardiology. Reported no symptoms, eating, growing and thriving Left ankle fx- aircast but no surgical intervention;  Hx of ingrown toenails, intoeing and follows up with Podiatry

## 2022-08-30 NOTE — PLAN OF CARE
[facility]     Department of Hospital Medicine     [address] [place] [address]     [phone] after hours  [phone] fax       NURSING HOME ORDERS    02/21/2020    Admit to Nursing Home:       Skilled Bed                                                  Diagnoses:  Active Hospital Problems    Diagnosis  POA    *Diabetic foot infection [E11.628, L08.9]  Yes    Swelling of left knee joint [M25.462]  Yes    Left arm swelling [M79.89]  No    Degenerative joint disease of knee, left [M17.12]  Yes    Osteomyelitis of left foot [M86.9]  Yes    PAD (peripheral artery disease) [I73.9]  Yes    Foot ulcer [L97.509]  Yes    Cellulitis [L03.90]  Yes    Acute osteomyelitis of toe of left foot [M86.172]  Yes    Chronic pain of left ankle [M25.572, G89.29]  Yes    Wound infection [T14.8XXA, L08.9]  Yes    Type 2 diabetes mellitus, without long-term current use of insulin [E11.9]  Yes    Essential hypertension [I10]  Yes      Resolved Hospital Problems   No resolved problems to display.       Patient is homebound due to:  Diabetic foot infection    Allergies:Review of patient's allergies indicates:  No Known Allergies    Vitals:       Every shift (Skilled Nursing patients)    Diet: Diabetic       Acitivities:      - Up in a chair each morning as tolerated   - Ambulate with assistance to bathroom   - Scheduled walks once each shift (every 8 hours)   - May ambulate independently   - May use walker, cane, or self-propelled wheelchair   - Weight bearing: Darcot boot for ambulation, weight bearing as tolerated to left foot in boot only for short distances.    LABS:  Per facility protocol    Weekly CBC, CMP, CRP, ESR every Wednesday faxed to ID clinic at [phone]        Nursing Precautions:     - Aspiration precautions:             - Total assistance with meals            -  Upright 90 degrees befor during and after meals             -  Suction at bedside           - Fall precautions per nursing home protocol   - Seizure precaution per group home protocol   - Decubitus precautions:        -  for positioning   - Pressure reducing foam mattress   - Turn patient every two hours. Use wedge pillows to anchor patient    CONSULTS:      Physical Therapy to evaluate and treat     Occupational Therapy to evaluate and treat         MISCELLANEOUS CARE:      Elevate left upper extremity and place warm compresses TID to left antecubital fossa at site of swelling.        Central Line Care.    Scrub the Hub: Prior to accessing the line, always perform a 30 second alcohol scrub  Each lumen of the central line is to be flushed at least daily with 10 mL Normal Saline and 3 mL Heparin flush (10 units/mL)  Skilled Nurse (SN) may draw blood from IV access  Blood Draw Procedure:   - Aspirate at least 5 mL of blood   - Discard   - Obtain specimen   - Change injection cap   - Flush with 20 mL Normal Saline followed by a                 3-5 mL Heparin flush (10 units/mL)  Central :   - Sterile dressing changes are done weekly and as needed.   - Use chlor-hexadine scrub to cleanse site, apply Biopatch to insertion site,       apply securement device dressing   - Injection caps are changed weekly and after EVERY lab draw.   - If sterile gauze is under dressing to control oozing,                 dressing change must be performed every 24 hours until gauze is not needed.        WOUND CARE:                      Wound Vac:  500 mL gel cannister   Location: left foot  Vac set to 75 mmHg continuous therapy.         Home health/facility to do dressing changes every 2-3 days as follows:  Rinse with saline, pat dry, apply wound vac on 75mmHg slow continuous therapy.  Dress with 2 rolls cast padding and flexinet.                                                DIABETES CARE:        Check blood sugar:         Fingerstick blood sugar AC and HS   Fingerstick blood sugar every 6 hours if  unable to eat      Report CBG < 60 or > 400 to physician.                                          Insulin Sliding Scale          Glucose  Novolog Insulin Subcutaneous        0 - 60   Orange juice or glucose tablet, hold insulin      No insulin   201-250  2 units   251-300  4 units   301-350  6 units   351-400  8 units   >400   10 units then call physician      Medications: Discontinue all previous medication orders, if any. See new list below.   Ana Maria Souza KARINA   Home Medication Instructions GEN:[id number]    Printed on:02/21/20 2148   Medication Information                      acetaminophen (TYLENOL) 325 MG tablet  Take 2 tablets (650 mg total) by mouth every 4 (four) hours as needed for Pain (pain scale 1-4).             bisacodyL (DULCOLAX) 10 mg Supp  Place 1 suppository (10 mg total) rectally daily as needed (if no BM x 2 days).             HYDROcodone-acetaminophen (NORCO) 5-325 mg per tablet  Take 1 tablet by mouth every 4 (four) hours as needed (pain scale 7-10).             ibuprofen (ADVIL,MOTRIN) 400 MG tablet  Take 1 tablet (400 mg total) by mouth every 6 (six) hours as needed (pain to left arm at site of swelling as needed).             insulin aspart U-100 (NOVOLOG) 100 unit/mL (3 mL) InPn pen  Inject 0-5 Units into the skin before meals and at bedtime as needed (Hyperglycemia per sliding scale Nursing home orders).             lisinopril (PRINIVIL,ZESTRIL) 40 MG tablet  Take 1 tablet (40 mg total) by mouth once daily.             melatonin (MELATIN) 3 mg tablet  Take 2 tablets (6 mg total) by mouth nightly as needed for Insomnia.             piperacillin sodium/tazobactam (PIPERACILLIN-TAZOBACTAM 4.5G/100ML SODIUM CHLORIDE 0.9%-READY TO MIX)  Inject 100 mLs (4.5 g total) into the vein every 8 (eight) hours.  - end date march 26th 2020           senna-docusate 8.6-50 mg (PERICOLACE) 8.6-50 mg per tablet  Take 1 tablet by mouth 2 (two) times daily as needed for Constipation.              sodium chloride 0.9% (NORMAL SALINE FLUSH) injection  Inject 10 mLs into the vein every 6 (six) hours PRN line care             sodium chloride 0.9% (NORMAL SALINE FLUSH) injection  Inject 10 mLs into the vein as needed PRN line care                       _________________________________  Reyna Tenorio MD  02/21/2020   Ear Star Wedge Flap Text: The defect edges were debeveled with a #15 blade scalpel.  Given the location of the defect and the proximity to free margins (helical rim) an ear star wedge flap was deemed most appropriate.  Using a sterile surgical marker, the appropriate flap was drawn incorporating the defect and placing the expected incisions between the helical rim and antihelix where possible.  The area thus outlined was incised through and through with a #15 scalpel blade.

## 2022-09-17 ENCOUNTER — CLINICAL SUPPORT (OUTPATIENT)
Dept: OTHER | Facility: CLINIC | Age: 72
End: 2022-09-17
Payer: MEDICARE

## 2022-09-17 DIAGNOSIS — Z00.8 ENCOUNTER FOR OTHER GENERAL EXAMINATION: ICD-10-CM

## 2022-09-20 VITALS — DIASTOLIC BLOOD PRESSURE: 82 MMHG | SYSTOLIC BLOOD PRESSURE: 160 MMHG

## 2022-09-20 LAB
GLUCOSE SERPL-MCNC: 135 MG/DL (ref 60–140)
HDLC SERPL-MCNC: 56 MG/DL
POC CHOLESTEROL, LDL (DOCK): 163 MG/DL
POC CHOLESTEROL, TOTAL: 240 MG/DL
TRIGL SERPL-MCNC: 118 MG/DL

## 2024-06-10 NOTE — PROGRESS NOTES
----- Message from Wendy Andrade sent at 6/10/2024 12:17 AM CDT -----  Regarding: Lab Client Services  Contact: [phone]  Good Morning,     My name is Wendy Andrade, I work in the Lab Client Services. We had a problem with some lab work on this patient. If someone from your office could call us at [phone] or ext. [phone] that would be great. Anyone in my department can help.      Thank you,   Tele box placed on patient and confirmed with tele tech. 81 NSR

## 2024-07-27 ENCOUNTER — CLINICAL SUPPORT (OUTPATIENT)
Dept: OTHER | Facility: CLINIC | Age: 74
End: 2024-07-27

## 2024-07-27 DIAGNOSIS — Z00.8 ENCOUNTER FOR OTHER GENERAL EXAMINATION: ICD-10-CM

## 2024-07-31 VITALS — SYSTOLIC BLOOD PRESSURE: 134 MMHG | DIASTOLIC BLOOD PRESSURE: 73 MMHG

## 2024-07-31 LAB
GLUCOSE SERPL-MCNC: 106 MG/DL (ref 60–140)
HDLC SERPL-MCNC: 55 MG/DL
POC CHOLESTEROL, LDL (DOCK): 115 MG/DL
POC CHOLESTEROL, TOTAL: 184 MG/DL
TRIGL SERPL-MCNC: 78 MG/DL

## (undated) DEVICE — CONTAINER SPECIMEN STRL 4OZ

## (undated) DEVICE — STRIP PK IODOFORM CURITY 2X5YD

## (undated) DEVICE — TRAY MINOR ORTHO

## (undated) DEVICE — NDL HYPO 27G X 1 1/2

## (undated) DEVICE — SPONGE DERMACEA GAUZE 4X4

## (undated) DEVICE — SYR 10CC LUER LOCK

## (undated) DEVICE — KIT IRR SUCTION HND PIECE

## (undated) DEVICE — SEE MEDLINE ITEM 157131

## (undated) DEVICE — SUT ETHILON 3-0 PS2 18 BLK

## (undated) DEVICE — SEE MEDLINE ITEM 157216

## (undated) DEVICE — KIT COLLECTION E SWAB MINI

## (undated) DEVICE — BANDAGE DERMACEA STRETCH 4X1IN

## (undated) DEVICE — PAD ABD 8X10 STERILE

## (undated) DEVICE — SEE MEDLINE ITEM 146298

## (undated) DEVICE — ELECTRODE REM PLYHSV RETURN 9

## (undated) DEVICE — GAUZE SPONGE 4X4 12PLY

## (undated) DEVICE — NDL 18GA X1 1/2 REG BEVEL

## (undated) DEVICE — DRESSING XEROFORM FOIL PK 1X8

## (undated) DEVICE — SEE MEDLINE ITEM 154981

## (undated) DEVICE — SEE MEDLINE ITEM 152515